# Patient Record
Sex: FEMALE | Race: WHITE | NOT HISPANIC OR LATINO | Employment: FULL TIME | URBAN - METROPOLITAN AREA
[De-identification: names, ages, dates, MRNs, and addresses within clinical notes are randomized per-mention and may not be internally consistent; named-entity substitution may affect disease eponyms.]

---

## 2019-05-09 NOTE — PROGRESS NOTES
[facility] OB Intake note  Subjective   28 year old woman presents to clinic for initiation of OB care. She has a history of one pregnancy that was complicated by severe HELLP and pre-eclampsia in 2016, delivering at 30 weeks. She was scheduled today for an OB intake appt, however with her LMP she would be 13w4d and per US today there is no yold sac, embryo or gestational sac. She took pregnancy tests on 5/4/19 and on 5/7/19 that all resulted positive. She has extremely irregular periods, sometimes only 2x a year. She has a dx of PCOS from her teenage years.  Patient's last menstrual period was 02/04/2019.     Symptoms since LMP include breast tenderness.     Objective  -VS: reviewed and within normal limits   -General appearance: no acute distress, patient is comfortable   NEUROLOGICAL/PSYCHIATRIC   - Orientated x3,   -Mood and affect: : normal   - See US report from today. Reviewed by Dr. Leonard    Assessment/Plan  Ameena was seen today for recheck.    Diagnoses and all orders for this visit:    Encounter for supervision of other normal pregnancy in first trimester  -     HCG Quantitative Pregnancy, Blood (PKJ912)  -     HCG Quantitative Pregnancy, Blood (EDG625); Future  -     US OB <14 Weeks w Transvaginal Single; Future      Outpatient Encounter Medications as of 5/10/2019   Medication Sig Dispense Refill     neomycin-polymyxin-dexamethasone (MAXITROL) 3.5-02943-2.1 ophthalmic ointment 1 g 4 times daily (before meals and nightly)  1     No facility-administered encounter medications on file as of 5/10/2019.       Orders Placed This Encounter   Procedures     US OB <14 Weeks w Transvaginal Single     HCG Quantitative Pregnancy, Blood (CYQ140)     HCG Quantitative Pregnancy, Blood ([id number])                 Orders Placed This Encounter   Procedures     US OB <14 Weeks w Transvaginal Single     HCG Quantitative Pregnancy, Blood (GGZ908)     HCG Quantitative Pregnancy, Blood (YCF505)     - Reviewed that this finding is  less concerning given her irregular menses, but still warrants closer attention.   - Educated about warning signs for ectopic, molar and mscg, when to call and when to come in.   - Will  a prenatal vitamin and begin taking.   - Serial Quant hcg's today and on Sunday.   - US at end of next week or beginning of next.     Annabel Avalos CNM

## 2019-05-10 ENCOUNTER — OFFICE VISIT (OUTPATIENT)
Dept: OBGYN | Facility: CLINIC | Age: 29
End: 2019-05-10
Attending: ADVANCED PRACTICE MIDWIFE
Payer: COMMERCIAL

## 2019-05-10 ENCOUNTER — APPOINTMENT (OUTPATIENT)
Dept: LAB | Facility: CLINIC | Age: 29
End: 2019-05-10
Attending: ADVANCED PRACTICE MIDWIFE
Payer: COMMERCIAL

## 2019-05-10 ENCOUNTER — HOSPITAL ENCOUNTER (OUTPATIENT)
Dept: ULTRASOUND IMAGING | Facility: CLINIC | Age: 29
Discharge: HOME | End: 2019-05-10
Attending: ADVANCED PRACTICE MIDWIFE | Admitting: ADVANCED PRACTICE MIDWIFE
Payer: COMMERCIAL

## 2019-05-10 VITALS
SYSTOLIC BLOOD PRESSURE: 120 MMHG | HEIGHT: 65 IN | HEART RATE: 66 BPM | DIASTOLIC BLOOD PRESSURE: 75 MMHG | WEIGHT: 169 LBS | BODY MASS INDEX: 28.16 KG/M2

## 2019-05-10 DIAGNOSIS — Z34.91 ENCOUNTER FOR SUPERVISION OF NORMAL PREGNANCY IN FIRST TRIMESTER, UNSPECIFIED GRAVIDITY: ICD-10-CM

## 2019-05-10 DIAGNOSIS — Z34.81 ENCOUNTER FOR SUPERVISION OF OTHER NORMAL PREGNANCY IN FIRST TRIMESTER: Primary | ICD-10-CM

## 2019-05-10 LAB — B-HCG SERPL-ACNC: 759 IU/L (ref 0–5)

## 2019-05-10 PROCEDURE — 36415 COLL VENOUS BLD VENIPUNCTURE: CPT | Performed by: ADVANCED PRACTICE MIDWIFE

## 2019-05-10 PROCEDURE — 76801 OB US < 14 WKS SINGLE FETUS: CPT

## 2019-05-10 PROCEDURE — 84702 CHORIONIC GONADOTROPIN TEST: CPT | Performed by: ADVANCED PRACTICE MIDWIFE

## 2019-05-10 RX ORDER — NEOMYCIN SULFATE, POLYMYXIN B SULFATE, AND DEXAMETHASONE 3.5; 10000; 1 MG/G; [USP'U]/G; MG/G
1 OINTMENT OPHTHALMIC
Refills: 1 | COMMUNITY
Start: 2019-04-13 | End: 2019-07-26

## 2019-05-10 ASSESSMENT — PAIN SCALES - GENERAL: PAINLEVEL: NO PAIN (0)

## 2019-05-10 ASSESSMENT — MIFFLIN-ST. JEOR: SCORE: 1487.46

## 2019-05-10 NOTE — LETTER
5/10/2019       RE: Ameena Cardoza  [address] [facility] [address]     Dear Colleague,    Thank you for referring your patient, Ameena Cardoza, to the [facility] at [facility]. Please see a copy of my visit note below.    [facility] Intake note  Subjective   28 year old woman presents to clinic for initiation of OB care. She has a history of one pregnancy that was complicated by severe HELLP and pre-eclampsia in 2016, delivering at 30 weeks. She was scheduled today for an OB intake appt, however with her LMP she would be 13w4d and per US today there is no yold sac, embryo or gestational sac. She took pregnancy tests on 5/4/19 and on 5/7/19 that all resulted positive. She has extremely irregular periods, sometimes only 2x a year. She has a dx of PCOS from her teenage years.  Patient's last menstrual period was 02/04/2019.     Symptoms since LMP include breast tenderness.     Objective  -VS: reviewed and within normal limits   -General appearance: no acute distress, patient is comfortable   NEUROLOGICAL/PSYCHIATRIC   - Orientated x3,   -Mood and affect: : normal   - See US report from today. Reviewed by Dr. Leonard    Assessment/Plan  Ameena was seen today for recheck.    Diagnoses and all orders for this visit:    Encounter for supervision of other normal pregnancy in first trimester  -     HCG Quantitative Pregnancy, Blood (UOR127)  -     HCG Quantitative Pregnancy, Blood ([id number]); Future  -     US OB <14 Weeks w Transvaginal Single; Future      Outpatient Encounter Medications as of 5/10/2019   Medication Sig Dispense Refill     neomycin-polymyxin-dexamethasone (MAXITROL) 3.5-02165-6.1 ophthalmic ointment 1 g 4 times daily (before meals and nightly)  1     No facility-administered encounter medications on file as of 5/10/2019.       Orders Placed This Encounter   Procedures     US OB <14 Weeks w Transvaginal Single     HCG Quantitative Pregnancy,  Blood ([id number])     HCG Quantitative Pregnancy, Blood (LPY358)                 Orders Placed This Encounter   Procedures     US OB <14 Weeks w Transvaginal Single     HCG Quantitative Pregnancy, Blood (CXG902)     HCG Quantitative Pregnancy, Blood (QYC911)     - Reviewed that this finding is less concerning given her irregular menses, but still warrants closer attention.   - Educated about warning signs for ectopic, molar and mscg, when to call and when to come in.   - Will  a prenatal vitamin and begin taking.   - Serial Quant hcg's today and on Sunday.   - US at end of next week or beginning of next.     Annabel Avalos CNM          Again, thank you for allowing me to participate in the care of your patient.      Sincerely,    Annabel Avalos CNM

## 2019-05-10 NOTE — LETTER
Date:May 14, 2019      Patient was self referred, no letter generated. Do not send.        [facility] Health Information

## 2019-05-12 DIAGNOSIS — Z34.81 ENCOUNTER FOR SUPERVISION OF OTHER NORMAL PREGNANCY IN FIRST TRIMESTER: ICD-10-CM

## 2019-05-12 LAB — B-HCG SERPL-ACNC: 1751 IU/L (ref 0–5)

## 2019-05-12 PROCEDURE — 84702 CHORIONIC GONADOTROPIN TEST: CPT | Performed by: ADVANCED PRACTICE MIDWIFE

## 2019-05-12 PROCEDURE — 36415 COLL VENOUS BLD VENIPUNCTURE: CPT | Performed by: ADVANCED PRACTICE MIDWIFE

## 2019-05-13 NOTE — RESULT ENCOUNTER NOTE
Patient called back to discuss results of hcg quant. Advised it is recommended to repeat US in one week to f/u on early pregnancy with no intrauterine sac at time of ultrasound. Discussed ectopic precautions and advised pt to call or present to ED if develops pain or bleeding. Pt will call radiology to schedule US as no appointments in clinic available. Pt will f/u in clinic with midwife on Tuesday 5/21. Appt scheduled. Pt questions asked and answered.

## 2019-05-20 ENCOUNTER — HOSPITAL ENCOUNTER (OUTPATIENT)
Dept: ULTRASOUND IMAGING | Facility: CLINIC | Age: 29
Discharge: HOME | End: 2019-05-20
Attending: ADVANCED PRACTICE MIDWIFE | Admitting: ADVANCED PRACTICE MIDWIFE
Payer: COMMERCIAL

## 2019-05-20 DIAGNOSIS — Z34.81 ENCOUNTER FOR SUPERVISION OF OTHER NORMAL PREGNANCY IN FIRST TRIMESTER: ICD-10-CM

## 2019-05-20 PROCEDURE — 76801 OB US < 14 WKS SINGLE FETUS: CPT

## 2019-05-21 ENCOUNTER — OFFICE VISIT (OUTPATIENT)
Dept: OBGYN | Facility: CLINIC | Age: 29
End: 2019-05-21
Attending: MIDWIFE
Payer: COMMERCIAL

## 2019-05-21 VITALS
HEART RATE: 86 BPM | DIASTOLIC BLOOD PRESSURE: 72 MMHG | WEIGHT: 170.3 LBS | SYSTOLIC BLOOD PRESSURE: 109 MMHG | BODY MASS INDEX: 28.37 KG/M2 | HEIGHT: 65 IN

## 2019-05-21 DIAGNOSIS — O09.90 HIGH-RISK PREGNANCY, UNSPECIFIED TRIMESTER: Primary | ICD-10-CM

## 2019-05-21 PROCEDURE — G0463 HOSPITAL OUTPT CLINIC VISIT: HCPCS | Mod: ZF

## 2019-05-21 ASSESSMENT — MIFFLIN-ST. JEOR: SCORE: 1493.36

## 2019-05-21 NOTE — LETTER
2019       RE: Ameena Cardoza  [address] [facility] [address] [address]     Dear Colleague,    Thank you for referring your patient, Ameena Cardoza, to the [facility] at [facility]. Please see a copy of my visit note below.    S:  27 yo   Pt here to discuss repeat US done in radiology yesterday.   Hx 1st US 5/10 after + UPT   No GS or evidence of pregnancy    repeat US  CRL [id number] XOCHITL 2020  Pt is noting mild nausea  Delivered at 30 wks due to severe preeclampsia with HELLP by  section.    young daughter is almost 3  Spent 7 wks in NICU  With no sequelae   This pregnancy is planned  Pt is happy with this good news    O:  VS WNL  See normal US result  A: 27 yo G2P 0101  Hx severe preeclampsia, HELLP confirmed early IIUP  P: plan scheduling OB intake    CECILY  Records from C/S Vidya Brian CNM APRSHAJI         Again, thank you for allowing me to participate in the care of your patient.      Sincerely,    HERNAN Sebastian CNM

## 2019-05-21 NOTE — PROGRESS NOTES
S:  29 yo   Pt here to discuss repeat US done in radiology yesterday.   Hx 1st US 5/10 after + UPT   No GS or evidence of pregnancy    repeat US  CRL [id number] XOCHITL 2020  Pt is noting mild nausea  Delivered at 30 wks due to severe preeclampsia with HELLP by  section.    young daughter is almost 3  Spent 7 wks in NICU  With no sequelae   This pregnancy is planned  Pt is happy with this good news    O:  VS WNL  See normal US result  A: 29 yo G2P 0101  Hx severe preeclampsia, HELLP confirmed early IIUP  P: plan scheduling OB intake    CECILY  Records from C/S Vidya Brian CNM APRN

## 2019-05-21 NOTE — LETTER
Date:May 24, 2019      Patient was self referred, no letter generated. Do not send.        [facility] Health Information

## 2019-05-27 ENCOUNTER — TRANSFERRED RECORDS (OUTPATIENT)
Dept: HEALTH INFORMATION MANAGEMENT | Facility: CLINIC | Age: 29
End: 2019-05-27

## 2019-06-13 NOTE — PROGRESS NOTES
[facility] OB Intake note  Subjective   28 year old woman presents to clinic for initiation of OB care. Patient's last menstrual period was 2019 (approximate).  at Unknown by Estimated Date of Delivery: 2020 based on US confirms. Reviewed dating ultrasound. Pregnancy is planned.      Symptoms since LMP include nausea, fatigue and weight loss. Patient has tried these relief measures: diet modification, vitamin B-6, Unisom, small frequent meals, increased rest and increased fluids.    - Genetic/Infection questionnaire completed, risks include. Pt  does not have a recent known exposure to Parvo or CMV so IgG/IgM testing WILL NOT be ordered.   Have you traveled during the pregnancy?No  Have your sexual partner(s) travelled during the pregnancy?No        - Current Medications  Current Outpatient Medications   Medication Sig Dispense Refill     aspirin (ASA) 81 MG tablet Take 1 tablet (81 mg) by mouth daily 100 tablet 3     ondansetron (ZOFRAN) 4 MG tablet Take 1 tablet (4 mg) by mouth every 8 hours as needed for nausea 14 tablet 0     Prenatal Vit-Fe Fumarate-FA (PRENATAL VITAMIN PO)        neomycin-polymyxin-dexamethasone (MAXITROL) 3.5-05702-0.1 ophthalmic ointment 1 g 4 times daily (before meals and nightly)  1         - Co-morbids   Past Medical History:   Diagnosis Date     Family history of breast cancer gene mutation in first degree relative     gene tested CHek 2 associated with increased risk  q6 mos mammo starting at 33      HELLP (hemolytic anemia/elev liver enzymes/low platelets in pregnancy)      PCOS (polycystic ovarian syndrome)     age 12 no treatment necessary      Preeclampsia, severe, third trimester     HTN, proteinuria, 30wks-->mag, IOL x 24 hrs->C/S     - Risk for GDM -  does not  have Personal history of GDM, BMI>30, h/o prediabetes/glucose intolerance, first degree relative with GDM or DM    WILL NOT have an early GCT and possible Hgb A1C    - High Risk for Pre E- meets one of  following criteria The patient does h/o Pre Eclampsia, Current multi fetal gestation, Pre Gestational Diabetes (Type 1 or Type 2), chronic hypertension, renal disease, Autoimmune disease (systematic lupus erythematosus, antiphospholipid syndrome) so WILL start low dose aspirin (81mg) starting between 12 and 28 weeks to prevent early onset preeclampsia.        - The patient  does not have a history of spontaneous  birth so  WILL NOT consider progesterone starting at 16-20 weeks and/or serial transvaginal cervical length ultrasounds from 16-24 weeks.     -The patient does not have a history of immunosuppresion or HIV so Toxoplasma IgG/IgM WILL NOT be ordered.     PERSONAL/SOCIAL HISTORY    lives with their family.  Employment: Full time. Her job involves sedentary activity .  History of anxiety or depression  no  Additional items: None    Objective  -VS: reviewed and within normal limits   -General appearance: no acute distress, patient is comfortable   NEUROLOGICAL/PSYCHIATRIC   - Orientated x3,   -Mood and affect: : normal     Assessment/Plan  Ameena was seen today for prenatal care.    Diagnoses and all orders for this visit:    High-risk pregnancy, unspecified trimester  -     ABO/Rh type and screen  -     Hepatitis B surface antigen  -     CBC with platelets  -     HIV Antigen Antibody Combo  -     Rubella Antibody IgG Quantitative  -     Treponema Abs w Reflex to RPR and Titer  -     Urine Culture Aerobic Bacterial  -     Vitamin D Deficiency  -     Hepatitis B Surface Antibody  -     Hepatitis C antibody  -     ondansetron (ZOFRAN) 4 MG tablet; Take 1 tablet (4 mg) by mouth every 8 hours as needed for nausea  -     aspirin (ASA) 81 MG tablet; Take 1 tablet (81 mg) by mouth daily  -     Cancel: ALT  -     Cancel: AST  -     Protein  random urine with Creat Ratio  -     ALT  -     AST  -     Creatinine urine calculation only  -     [facility] FETAL MED CTR REFERRAL-PREGNANCY    History of   delivery    History of severe pre-eclampsia  Comments:  dx at 30wks, admitted, BMZ, started IOL (x24 hrs), then prim CS. Infant in NICU x 7 wks, doing well now at age 1  Orders:  -     ABO/Rh type and screen  -     Hepatitis B surface antigen  -     CBC with platelets  -     HIV Antigen Antibody Combo  -     Rubella Antibody IgG Quantitative  -     Treponema Abs w Reflex to RPR and Titer  -     Urine Culture Aerobic Bacterial  -     Vitamin D Deficiency  -     Hepatitis B Surface Antibody  -     Hepatitis C antibody  -     ondansetron (ZOFRAN) 4 MG tablet; Take 1 tablet (4 mg) by mouth every 8 hours as needed for nausea  -     aspirin (ASA) 81 MG tablet; Take 1 tablet (81 mg) by mouth daily  -     Cancel: ALT  -     Cancel: AST  -     Protein  random urine with Creat Ratio  -     MAT FETAL MED CTR REFERRAL-PREGNANCY    Family history of breast cancer gene mutation in first degree relative  -     [facility] FETAL MED CTR REFERRAL-PREGNANCY    PCOS (polycystic ovarian syndrome)  -     MAT FETAL MED CTR REFERRAL-PREGNANCY        28 year old  Unknown weeks of pregnancy with XOCHITL of 2020 by LMP of Patient's last menstrual period was 2019 (approximate).. Ultrasound confirms.   Outpatient Encounter Medications as of 2019   Medication Sig Dispense Refill     aspirin (ASA) 81 MG tablet Take 1 tablet (81 mg) by mouth daily 100 tablet 3     ondansetron (ZOFRAN) 4 MG tablet Take 1 tablet (4 mg) by mouth every 8 hours as needed for nausea 14 tablet 0     Prenatal Vit-Fe Fumarate-FA (PRENATAL VITAMIN PO)        neomycin-polymyxin-dexamethasone (MAXITROL) 3.5-86043-3.1 ophthalmic ointment 1 g 4 times daily (before meals and nightly)  1     No facility-administered encounter medications on file as of 2019.       Orders Placed This Encounter   Procedures     Hepatitis B surface antigen     CBC with platelets     HIV Antigen Antibody Combo     Rubella Antibody IgG Quantitative     Treponema Abs w Reflex to RPR  and Titer     Vitamin D Deficiency     Hepatitis B Surface Antibody     Hepatitis C antibody     Protein  random urine with Creat Ratio     ALT     AST     Creatinine urine calculation only     MAT FETAL MED CTR REFERRAL-PREGNANCY     ABO/Rh type and screen                 Orders Placed This Encounter   Procedures     Hepatitis B surface antigen     CBC with platelets     HIV Antigen Antibody Combo     Rubella Antibody IgG Quantitative     Treponema Abs w Reflex to RPR and Titer     Vitamin D Deficiency     Hepatitis B Surface Antibody     Hepatitis C antibody     Protein  random urine with Creat Ratio     ALT     AST     Creatinine urine calculation only     MAT FETAL MED CTR REFERRAL-PREGNANCY     ABO/Rh type and screen         - Oriented to Practice, types of care, and how to reach a provider.  Pt prefers CNM vs MD ,open.  - Patient received 1st trimester new OB education packet complete with aide of [employer] booklet including information on genetic screening test options.  - Patient desires 1st trimester screening and desires level II ultrasound which were ordered.  - Educational handout on the prevention of infections diseases during pregnancy provided.  - Patient was encouraged to start prenatal vitamins as tolerated.    - Patient was sent to lab for routine OB labs including PreE labs.  Hepatitis C screening was ordered . .  - Reviewed recommendation for low dose aspirin daily to prevent pre eclampsia, pt agrees, follow up at NOB visit. RX sent  - Pregnancy concerns to be addressed by provider at new OB exam include: history of preeclampsia, will ask MFM as well.    Pt to RTO for NOB visit in 2 weeks and prn if questions or concerns    HERNAN Marquez CNM

## 2019-06-14 ENCOUNTER — TRANSCRIBE ORDERS (OUTPATIENT)
Dept: OBGYN | Facility: CLINIC | Age: 29
End: 2019-06-14

## 2019-06-14 ENCOUNTER — OFFICE VISIT (OUTPATIENT)
Dept: OBGYN | Facility: CLINIC | Age: 29
End: 2019-06-14
Attending: ADVANCED PRACTICE MIDWIFE
Payer: COMMERCIAL

## 2019-06-14 VITALS
SYSTOLIC BLOOD PRESSURE: 112 MMHG | WEIGHT: 167.2 LBS | BODY MASS INDEX: 27.86 KG/M2 | HEART RATE: 64 BPM | HEIGHT: 65 IN | DIASTOLIC BLOOD PRESSURE: 70 MMHG

## 2019-06-14 DIAGNOSIS — E28.2 PCOS (POLYCYSTIC OVARIAN SYNDROME): ICD-10-CM

## 2019-06-14 DIAGNOSIS — Z87.59 HISTORY OF SEVERE PRE-ECLAMPSIA: ICD-10-CM

## 2019-06-14 DIAGNOSIS — Z84.81 FAMILY HISTORY OF BREAST CANCER GENE MUTATION IN FIRST DEGREE RELATIVE: ICD-10-CM

## 2019-06-14 DIAGNOSIS — O09.90 HIGH-RISK PREGNANCY, UNSPECIFIED TRIMESTER: Primary | ICD-10-CM

## 2019-06-14 DIAGNOSIS — Z98.891 HISTORY OF CESAREAN DELIVERY: ICD-10-CM

## 2019-06-14 DIAGNOSIS — O26.90 PREGNANCY RELATED CONDITION, ANTEPARTUM: Primary | ICD-10-CM

## 2019-06-14 LAB
ABO + RH BLD: NORMAL
ABO + RH BLD: NORMAL
BLD GP AB SCN SERPL QL: NORMAL
BLOOD BANK CMNT PATIENT-IMP: NORMAL
CREAT UR-MCNC: 38 MG/DL
ERYTHROCYTE [DISTWIDTH] IN BLOOD BY AUTOMATED COUNT: 12.3 % (ref 10–15)
HCT VFR BLD AUTO: 39.9 % (ref 35–47)
HGB BLD-MCNC: 13.2 G/DL (ref 11.7–15.7)
MCH RBC QN AUTO: 29.9 PG (ref 26.5–33)
MCHC RBC AUTO-ENTMCNC: 33.1 G/DL (ref 31.5–36.5)
MCV RBC AUTO: 91 FL (ref 78–100)
PLATELET # BLD AUTO: 265 10E9/L (ref 150–450)
PROT UR-MCNC: <0.05 G/L
PROT/CREAT 24H UR: NORMAL G/G CR (ref 0–0.2)
RBC # BLD AUTO: 4.41 10E12/L (ref 3.8–5.2)
SPECIMEN EXP DATE BLD: NORMAL
WBC # BLD AUTO: 8.9 10E9/L (ref 4–11)

## 2019-06-14 PROCEDURE — 82306 VITAMIN D 25 HYDROXY: CPT | Performed by: ADVANCED PRACTICE MIDWIFE

## 2019-06-14 PROCEDURE — 86803 HEPATITIS C AB TEST: CPT | Performed by: ADVANCED PRACTICE MIDWIFE

## 2019-06-14 PROCEDURE — 84460 ALANINE AMINO (ALT) (SGPT): CPT | Performed by: ADVANCED PRACTICE MIDWIFE

## 2019-06-14 PROCEDURE — 87186 SC STD MICRODIL/AGAR DIL: CPT | Performed by: ADVANCED PRACTICE MIDWIFE

## 2019-06-14 PROCEDURE — G0463 HOSPITAL OUTPT CLINIC VISIT: HCPCS

## 2019-06-14 PROCEDURE — 84156 ASSAY OF PROTEIN URINE: CPT | Performed by: ADVANCED PRACTICE MIDWIFE

## 2019-06-14 PROCEDURE — 86900 BLOOD TYPING SEROLOGIC ABO: CPT | Performed by: ADVANCED PRACTICE MIDWIFE

## 2019-06-14 PROCEDURE — 87086 URINE CULTURE/COLONY COUNT: CPT | Performed by: ADVANCED PRACTICE MIDWIFE

## 2019-06-14 PROCEDURE — 36415 COLL VENOUS BLD VENIPUNCTURE: CPT | Performed by: ADVANCED PRACTICE MIDWIFE

## 2019-06-14 PROCEDURE — 86706 HEP B SURFACE ANTIBODY: CPT | Performed by: ADVANCED PRACTICE MIDWIFE

## 2019-06-14 PROCEDURE — 86850 RBC ANTIBODY SCREEN: CPT | Performed by: ADVANCED PRACTICE MIDWIFE

## 2019-06-14 PROCEDURE — 84450 TRANSFERASE (AST) (SGOT): CPT | Performed by: ADVANCED PRACTICE MIDWIFE

## 2019-06-14 PROCEDURE — 86901 BLOOD TYPING SEROLOGIC RH(D): CPT | Performed by: ADVANCED PRACTICE MIDWIFE

## 2019-06-14 PROCEDURE — 87340 HEPATITIS B SURFACE AG IA: CPT | Performed by: ADVANCED PRACTICE MIDWIFE

## 2019-06-14 PROCEDURE — 87088 URINE BACTERIA CULTURE: CPT | Performed by: ADVANCED PRACTICE MIDWIFE

## 2019-06-14 PROCEDURE — 86762 RUBELLA ANTIBODY: CPT | Performed by: ADVANCED PRACTICE MIDWIFE

## 2019-06-14 PROCEDURE — 85027 COMPLETE CBC AUTOMATED: CPT | Performed by: ADVANCED PRACTICE MIDWIFE

## 2019-06-14 PROCEDURE — 87389 HIV-1 AG W/HIV-1&-2 AB AG IA: CPT | Performed by: ADVANCED PRACTICE MIDWIFE

## 2019-06-14 PROCEDURE — 86780 TREPONEMA PALLIDUM: CPT | Performed by: ADVANCED PRACTICE MIDWIFE

## 2019-06-14 RX ORDER — ONDANSETRON 4 MG/1
4 TABLET, FILM COATED ORAL EVERY 8 HOURS PRN
Qty: 14 TABLET | Refills: 0 | Status: SHIPPED | OUTPATIENT
Start: 2019-06-14 | End: 2019-07-26

## 2019-06-14 ASSESSMENT — MIFFLIN-ST. JEOR: SCORE: 1479.29

## 2019-06-14 NOTE — LETTER
Date:June 18, 2019      Patient was self referred, no letter generated. Do not send.        [facility] Health Information

## 2019-06-14 NOTE — LETTER
2019       RE: Ameena Cardoza  [address]  [facility] [id number]     Dear Colleague,    Thank you for referring your patient, Ameena Cardoza, to the [facility] at [facility]. Please see a copy of my visit note below.    [facility] OB Intake note  Subjective   28 year old woman presents to clinic for initiation of OB care. Patient's last menstrual period was 2019 (approximate).  at Unknown by Estimated Date of Delivery: 2020 based on US confirms. Reviewed dating ultrasound. Pregnancy is planned.      Symptoms since LMP include nausea, fatigue and weight loss. Patient has tried these relief measures: diet modification, vitamin B-6, Unisom, small frequent meals, increased rest and increased fluids.    - Genetic/Infection questionnaire completed, risks include. Pt  does not have a recent known exposure to Parvo or CMV so IgG/IgM testing WILL NOT be ordered.   Have you traveled during the pregnancy?No  Have your sexual partner(s) travelled during the pregnancy?No        - Current Medications  Current Outpatient Medications   Medication Sig Dispense Refill     aspirin (ASA) 81 MG tablet Take 1 tablet (81 mg) by mouth daily 100 tablet 3     ondansetron (ZOFRAN) 4 MG tablet Take 1 tablet (4 mg) by mouth every 8 hours as needed for nausea 14 tablet 0     Prenatal Vit-Fe Fumarate-FA (PRENATAL VITAMIN PO)        neomycin-polymyxin-dexamethasone (MAXITROL) 3.5-93564-1.1 ophthalmic ointment 1 g 4 times daily (before meals and nightly)  1         - Co-morbids   Past Medical History:   Diagnosis Date     Family history of breast cancer gene mutation in first degree relative     gene tested CHek 2 associated with increased risk  q6 mos mammo starting at 33      HELLP (hemolytic anemia/elev liver enzymes/low platelets in pregnancy)      PCOS (polycystic ovarian syndrome)     age 12 no treatment necessary      Preeclampsia, severe, third  trimester 2016    HTN, proteinuria, 30wks-->mag, IOL x 24 hrs->C/S     - Risk for GDM -  does not  have Personal history of GDM, BMI>30, h/o prediabetes/glucose intolerance, first degree relative with GDM or DM    WILL NOT have an early GCT and possible Hgb A1C    - High Risk for Pre E- meets one of following criteria The patient does h/o Pre Eclampsia, Current multi fetal gestation, Pre Gestational Diabetes (Type 1 or Type 2), chronic hypertension, renal disease, Autoimmune disease (systematic lupus erythematosus, antiphospholipid syndrome) so WILL start low dose aspirin (81mg) starting between 12 and 28 weeks to prevent early onset preeclampsia.        - The patient  does not have a history of spontaneous  birth so  WILL NOT consider progesterone starting at 16-20 weeks and/or serial transvaginal cervical length ultrasounds from 16-24 weeks.     -The patient does not have a history of immunosuppresion or HIV so Toxoplasma IgG/IgM WILL NOT be ordered.     PERSONAL/SOCIAL HISTORY    lives with their family.  Employment: Full time. Her job involves sedentary activity .  History of anxiety or depression  no  Additional items: None    Objective  -VS: reviewed and within normal limits   -General appearance: no acute distress, patient is comfortable   NEUROLOGICAL/PSYCHIATRIC   - Orientated x3,   -Mood and affect: : normal     Assessment/Plan  Ameena was seen today for prenatal care.    Diagnoses and all orders for this visit:    High-risk pregnancy, unspecified trimester  -     ABO/Rh type and screen  -     Hepatitis B surface antigen  -     CBC with platelets  -     HIV Antigen Antibody Combo  -     Rubella Antibody IgG Quantitative  -     Treponema Abs w Reflex to RPR and Titer  -     Urine Culture Aerobic Bacterial  -     Vitamin D Deficiency  -     Hepatitis B Surface Antibody  -     Hepatitis C antibody  -     ondansetron (ZOFRAN) 4 MG tablet; Take 1 tablet (4 mg) by mouth every 8 hours as needed for  nausea  -     aspirin (ASA) 81 MG tablet; Take 1 tablet (81 mg) by mouth daily  -     Cancel: ALT  -     Cancel: AST  -     Protein  random urine with Creat Ratio  -     ALT  -     AST  -     Creatinine urine calculation only  -     [facility] FETAL MED CTR REFERRAL-PREGNANCY    History of  delivery    History of severe pre-eclampsia  Comments:  dx at 30wks, admitted, BMZ, started IOL (x24 hrs), then prim CS. Infant in NICU x 7 wks, doing well now at age 1  Orders:  -     ABO/Rh type and screen  -     Hepatitis B surface antigen  -     CBC with platelets  -     HIV Antigen Antibody Combo  -     Rubella Antibody IgG Quantitative  -     Treponema Abs w Reflex to RPR and Titer  -     Urine Culture Aerobic Bacterial  -     Vitamin D Deficiency  -     Hepatitis B Surface Antibody  -     Hepatitis C antibody  -     ondansetron (ZOFRAN) 4 MG tablet; Take 1 tablet (4 mg) by mouth every 8 hours as needed for nausea  -     aspirin (ASA) 81 MG tablet; Take 1 tablet (81 mg) by mouth daily  -     Cancel: ALT  -     Cancel: AST  -     Protein  random urine with Creat Ratio  -     [facility] FETAL MED CTR REFERRAL-PREGNANCY    Family history of breast cancer gene mutation in first degree relative  -     [facility] FETAL MED CTR REFERRAL-PREGNANCY    PCOS (polycystic ovarian syndrome)  -     [facility] FETAL MED CTR REFERRAL-PREGNANCY        28 year old  Unknown weeks of pregnancy with XOCHITL of 2020 by LMP of Patient's last menstrual period was 2019 (approximate).. Ultrasound confirms.   Outpatient Encounter Medications as of 2019   Medication Sig Dispense Refill     aspirin (ASA) 81 MG tablet Take 1 tablet (81 mg) by mouth daily 100 tablet 3     ondansetron (ZOFRAN) 4 MG tablet Take 1 tablet (4 mg) by mouth every 8 hours as needed for nausea 14 tablet 0     Prenatal Vit-Fe Fumarate-FA (PRENATAL VITAMIN PO)        neomycin-polymyxin-dexamethasone (MAXITROL) 3.5-40153-7.1 ophthalmic ointment 1 g 4 times daily (before meals and  nightly)  1     No facility-administered encounter medications on file as of 6/14/2019.       Orders Placed This Encounter   Procedures     Hepatitis B surface antigen     CBC with platelets     HIV Antigen Antibody Combo     Rubella Antibody IgG Quantitative     Treponema Abs w Reflex to RPR and Titer     Vitamin D Deficiency     Hepatitis B Surface Antibody     Hepatitis C antibody     Protein  random urine with Creat Ratio     ALT     AST     Creatinine urine calculation only     [facility] FETAL MED CTR REFERRAL-PREGNANCY     ABO/Rh type and screen                 Orders Placed This Encounter   Procedures     Hepatitis B surface antigen     CBC with platelets     HIV Antigen Antibody Combo     Rubella Antibody IgG Quantitative     Treponema Abs w Reflex to RPR and Titer     Vitamin D Deficiency     Hepatitis B Surface Antibody     Hepatitis C antibody     Protein  random urine with Creat Ratio     ALT     AST     Creatinine urine calculation only     MAT FETAL MED CTR REFERRAL-PREGNANCY     ABO/Rh type and screen         - Oriented to Practice, types of care, and how to reach a provider.  Pt prefers CNM vs MD ,open.  - Patient received 1st trimester new OB education packet complete with aide of [employer] booklet including information on genetic screening test options.  - Patient desires 1st trimester screening and desires level II ultrasound which were ordered.  - Educational handout on the prevention of infections diseases during pregnancy provided.  - Patient was encouraged to start prenatal vitamins as tolerated.    - Patient was sent to lab for routine OB labs including PreE labs.  Hepatitis C screening was ordered . .  - Reviewed recommendation for low dose aspirin daily to prevent pre eclampsia, pt agrees, follow up at NOB visit. RX sent  - Pregnancy concerns to be addressed by provider at new OB exam include: history of preeclampsia, will ask MFM as well.    Pt to RTO for NOB visit in 2 weeks and prn  if questions or concerns    HERNAN Marquez CNM            Again, thank you for allowing me to participate in the care of your patient.      Sincerely,    HERNAN Marquez CNM

## 2019-06-15 LAB
RUBV IGG SERPL IA-ACNC: 9 IU/ML
T PALLIDUM AB SER QL: NONREACTIVE

## 2019-06-16 LAB
BACTERIA SPEC CULT: ABNORMAL
BACTERIA SPEC CULT: ABNORMAL
Lab: ABNORMAL
SPECIMEN SOURCE: ABNORMAL

## 2019-06-17 DIAGNOSIS — N30.00 ACUTE CYSTITIS WITHOUT HEMATURIA: Primary | ICD-10-CM

## 2019-06-17 LAB
ALT SERPL W P-5'-P-CCNC: 27 U/L (ref 0–50)
AST SERPL W P-5'-P-CCNC: 15 U/L (ref 0–45)
DEPRECATED CALCIDIOL+CALCIFEROL SERPL-MC: 40 UG/L (ref 20–75)
HBV SURFACE AB SERPL IA-ACNC: 179.49 M[IU]/ML
HBV SURFACE AG SERPL QL IA: NONREACTIVE
HCV AB SERPL QL IA: NONREACTIVE
HIV 1+2 AB+HIV1 P24 AG SERPL QL IA: NONREACTIVE

## 2019-06-17 RX ORDER — CEPHALEXIN 750 MG/1
750 CAPSULE ORAL 2 TIMES DAILY
Qty: 14 CAPSULE | Refills: 0 | Status: SHIPPED | OUTPATIENT
Start: 2019-06-17 | End: 2019-06-28

## 2019-06-24 ENCOUNTER — TELEPHONE (OUTPATIENT)
Dept: MATERNAL FETAL MEDICINE | Facility: CLINIC | Age: 29
End: 2019-06-24

## 2019-06-24 NOTE — TELEPHONE ENCOUNTER
DEMARCUS received referral from [facility] for FTS and L2.  Patient is scheduled for her L2 on 8/16, patient stated that she is no longer interested in pursuing the FTS.  Referring clinic notified, removing FTS orders.    Melissa Cardoza

## 2019-06-28 ENCOUNTER — OFFICE VISIT (OUTPATIENT)
Dept: OBGYN | Facility: CLINIC | Age: 29
End: 2019-06-28
Attending: ADVANCED PRACTICE MIDWIFE
Payer: COMMERCIAL

## 2019-06-28 VITALS
WEIGHT: 169 LBS | BODY MASS INDEX: 28.16 KG/M2 | HEIGHT: 65 IN | DIASTOLIC BLOOD PRESSURE: 72 MMHG | SYSTOLIC BLOOD PRESSURE: 121 MMHG | HEART RATE: 74 BPM

## 2019-06-28 DIAGNOSIS — O09.90 HIGH-RISK PREGNANCY, UNSPECIFIED TRIMESTER: Primary | ICD-10-CM

## 2019-06-28 DIAGNOSIS — Z87.59 HISTORY OF SEVERE PRE-ECLAMPSIA: ICD-10-CM

## 2019-06-28 PROCEDURE — G0463 HOSPITAL OUTPT CLINIC VISIT: HCPCS | Mod: ZF

## 2019-06-28 ASSESSMENT — PAIN SCALES - GENERAL: PAINLEVEL: NO PAIN (0)

## 2019-06-28 ASSESSMENT — MIFFLIN-ST. JEOR: SCORE: 1487.46

## 2019-06-28 NOTE — NURSING NOTE
Chief Complaint   Patient presents with     Prenatal Care     HERB 11 weeks and 3 days   Candace Dorsey LPN

## 2019-06-28 NOTE — LETTER
2019       RE: Ameena Cardoza  [address] [facility] [address]     Dear Colleague,    Thank you for referring your patient, Ameena Cardoza, to the [facility] at [facility]. Please see a copy of my visit note below.    SUBJECTIVE:   Ameena is a 28 year old female who presents to clinic for a new OB visit.   at 11w3d with Estimated Date of Delivery: 2020 based on dating ultrasound. Feels well. Has  started PNV.     She has not had bleeding since her LMP.   She has had mild nausea. Weight loss has occurred for 1 lb.  This was a planned pregnancy.   FOB is involved,  - Red     OTHER CONCERNS:   - Took abx for +urine culture at [facility]. Needs KEREN- unable to leave sample.   PreE dx 30 wk, inpatient, BMZ, IOL, mag sulfate, C/S,7 weeks in NICU   Hx of LTCS for pre-eclampsia at 30 weeks (received BMTZ, Mag- initially induced)  - Desires repeat c/s  - nervous re: pre-e again this pregnancy    ===========================================   ROS: 10 point ROS neg other than the symptoms noted above in the HPI.    PSYCHIATRIC:  Denies mood changes, difficulty concentrating, depression, anxiety, panic attacks or post partum depression  PHQ-9 score:  No flowsheet data found.  No flowsheet data found.      Past History:  Her past medical history   Past Medical History:   Diagnosis Date     Family history of breast cancer gene mutation in first degree relative     gene tested CHek 2 associated with increased risk  q6 mos mammo starting at 33      HELLP (hemolytic anemia/elev liver enzymes/low platelets in pregnancy)      PCOS (polycystic ovarian syndrome)     age 12 no treatment necessary      Preeclampsia, severe, third trimester 2016    HTN, proteinuria, 30wks-->mag, IOL x 24 hrs->C/S   .     Since her last LMP she denies use of alcohol, tobacco and street drugs.  HISTORY:  Family History   Problem Relation Age of Onset     Breast  Cancer Paternal Grandmother         age 40-x 2 bouts     Hypertension Father 45     Breast Cancer Maternal Aunt         40     Breast Cancer Maternal Aunt         40     Diabetes Paternal Grandfather      Social History     Socioeconomic History     Marital status:      Spouse name: Red     Number of children: 1     Years of education: Not on file     Highest education level: Not on file   Occupational History     Comment: advisor, undergrad, learning abroad   Social Needs     Financial resource strain: Not on file     Food insecurity:     Worry: Not on file     Inability: Not on file     Transportation needs:     Medical: Not on file     Non-medical: Not on file   Tobacco Use     Smoking status: Never Smoker     Smokeless tobacco: Never Used   Substance and Sexual Activity     Alcohol use: Not Currently     Drug use: Not Currently     Sexual activity: Yes     Partners: Male   Lifestyle     Physical activity:     Days per week: Not on file     Minutes per session: Not on file     Stress: Not on file   Relationships     Social connections:     Talks on phone: Not on file     Gets together: Not on file     Attends Judaism service: Not on file     Active member of club or organization: Not on file     Attends meetings of clubs or organizations: Not on file     Relationship status: Not on file     Intimate partner violence:     Fear of current or ex partner: Not on file     Emotionally abused: Not on file     Physically abused: Not on file     Forced sexual activity: Not on file   Other Topics Concern     Not on file   Social History Narrative     Not on file     Current Outpatient Medications   Medication Sig     aspirin (ASA) 81 MG tablet Take 1 tablet (81 mg) by mouth daily     neomycin-polymyxin-dexamethasone (MAXITROL) 3.5-81648-0.1 ophthalmic ointment 1 g 4 times daily (before meals and nightly)     ondansetron (ZOFRAN) 4 MG tablet Take 1 tablet (4 mg) by mouth every 8 hours as needed for nausea      "Prenatal Vit-Fe Fumarate-FA (PRENATAL VITAMIN PO)      No current facility-administered medications for this visit.      Allergies   Allergen Reactions     Sulfa Drugs GI Disturbance       ============================================  MEDICAL HISTORY  Past Medical History:   Diagnosis Date     Family history of breast cancer gene mutation in first degree relative     gene tested CHek 2 associated with increased risk  q6 mos mammo starting at 33      HELLP (hemolytic anemia/elev liver enzymes/low platelets in pregnancy)      PCOS (polycystic ovarian syndrome)     age 12 no treatment necessary      Preeclampsia, severe, third trimester     HTN, proteinuria, 30wks-->mag, IOL x 24 hrs->C/S     Past Surgical History:   Procedure Laterality Date      SECTION      30 wks gestation, HTN, induction->C/S,        OB History    Para Term  AB Living   2 1 0 1 0 1   SAB TAB Ectopic Multiple Live Births   0 0 0 0 1      # Outcome Date GA Lbr Jose Miguel/2nd Weight Sex Delivery Anes PTL Lv   2 Current            1  16 30w6d  1.162 kg (2 lb 9 oz) F CS-LTranv Spinal Y CHARIS      Birth Comments: [place]      Complications: Preeclampsia/Hypertension, HELLP (hemolytic anemia/elev liver enzymes/low platelets in pregnancy)      Name: Yolanda       Obstetric Comments   PreE dx 30 wk, inpatient, BMZ, IOL, mag sulfate, C/S,7 weeks in NICU          GYN History- no hx of abnormal    I personally reviewed the past social/family/medical and surgical history on the date of service.   I reviewed lab work done at Intake visit with patient.    EXAM:  /72   Pulse 74   Ht 1.651 m (5' 5\")   Wt 76.7 kg (169 lb)   LMP 2019 (Approximate)   Breastfeeding? No   BMI 28.12 kg/m      EXAM:  GENERAL:  Pleasant pregnant female, alert, cooperative  and well groomed.  SKIN:  Warm and dry, without lesions or rashes  HEAD: Symmetrical features.  MOUTH:  Buccal mucosa pink, moist without lesions.  " Teeth in good repair.    NECK:  Thyroid without enlargement and nodules.  Lymph nodes not palpable.   LUNGS:  Clear to auscultation.  BREAST:    No dominant, fixed or suspicious masses are noted.  No skin or nipple changes or axillary nodes.   Nipples everted.      HEART:  RRR without murmur.  ABDOMEN: Soft without masses , tenderness or organomegaly.  No CVA tenderness.  Uterus palpable at size equal to dates.  Well healed scar from  section. Fetal heart tones present.  MUSCULOSKELETAL:  Full range of motion  EXTREMITIES:  No edema. No significant varicosities.   PELVIC EXAM: deferred  WET PREP:Not done  GC/CHLAMYDIA CULTURE OBTAINED: NO- unable to leave sample      ASSESSMENT:  28 year old , 11w3d weeks of pregnancy with XOCHITL of 2020 by dating ultrasound  Intrauterine pregnancy 11w3d size consistent with dates  Genetic Screening: Level 2 Ultrasound only    ICD-10-CM    1. High-risk pregnancy, unspecified trimester O09.90 CANCELED: Chlamydia PCR (Clinic Collect)     CANCELED: Gonorrhea PCR   2. History of severe pre-eclampsia Z87.59        PLAN:  - Reviewed use of triage nurse line and contacting the on-call provider after hours for an urgent need such as fever, vagina bleeding, bladder or vaginal infection, rupture of membranes,  or term labor.    - Reviewed best evidence for: weight gain for her weight and height for pregnancy:  Based on pre-pregnancy weight and Body mass index is 28.12 kg/m . RECOMMENDED WEIGHT GAIN: 15-25 lbs.  -If BMI>=30, weight gain/exercise handout given and reviewed. BMI entered on problem list, to include patient's preferred way to reference obesity during prenatal care, with plan for possible referrals and  testing  -If BMI >=30, and has one of other risk for sleep apnea (snoring, observed apnea or hypertension) refer for sleep study.  - Reviewed healthy diet and foods to avoid; exercise and activity during pregnancy; avoiding exposure to  toxoplasmosis; and maintenance of a generally healthy lifestyle.   - Discussed the harms, benefits, side effects and alternative therapies for current prescribed and OTC medications.    - All pt's  questions discussed and answered.  Pt verbalized understanding of and agreement to plan of care.     - Reviewed OBI labs.  - Did not collect urine today as pt could not leave a sample.   - Needs urine culture test of cure at next visit or sooner prn.  - Needs GC/CT.  - Reviewed high risk for pre-eclampsia. Pt to start daily 81mg aspirin at 12-14 weeks.   - Desires repeat  section.  - Level 2 ultrasound scheduled for 2019.     - Continue scheduled prenatal care, RTC in 4 weeks and prn if questions or concerns    HERNAN Hdz CNM     Again, thank you for allowing me to participate in the care of your patient.      Sincerely,    Tomasa Roach CNM

## 2019-06-28 NOTE — PROGRESS NOTES
SUBJECTIVE:   Ameena is a 28 year old female who presents to clinic for a new OB visit.   at 11w3d with Estimated Date of Delivery: 2020 based on dating ultrasound. Feels well. Has  started PNV.     She has not had bleeding since her LMP.   She has had mild nausea. Weight loss has occurred for 1 lb.  This was a planned pregnancy.   FOB is involved,  - Red     OTHER CONCERNS:   - Took abx for +urine culture at [facility]. Needs KEREN- unable to leave sample.   PreE dx 30 wk, inpatient, BMZ, IOL, mag sulfate, C/S,7 weeks in NICU   Hx of LTCS for pre-eclampsia at 30 weeks (received BMTZ, Mag- initially induced)  - Desires repeat c/s  - nervous re: pre-e again this pregnancy    ===========================================   ROS: 10 point ROS neg other than the symptoms noted above in the HPI.    PSYCHIATRIC:  Denies mood changes, difficulty concentrating, depression, anxiety, panic attacks or post partum depression  PHQ-9 score:  No flowsheet data found.  No flowsheet data found.      Past History:  Her past medical history   Past Medical History:   Diagnosis Date     Family history of breast cancer gene mutation in first degree relative     gene tested CHek 2 associated with increased risk  q6 mos mammo starting at 33      HELLP (hemolytic anemia/elev liver enzymes/low platelets in pregnancy)      PCOS (polycystic ovarian syndrome)     age 12 no treatment necessary      Preeclampsia, severe, third trimester     HTN, proteinuria, 30wks-->mag, IOL x 24 hrs->C/S   .     Since her last LMP she denies use of alcohol, tobacco and street drugs.  HISTORY:  Family History   Problem Relation Age of Onset     Breast Cancer Paternal Grandmother         age 40-x 2 bouts     Hypertension Father 45     Breast Cancer Maternal Aunt         40     Breast Cancer Maternal Aunt         40     Diabetes Paternal Grandfather      Social History     Socioeconomic History     Marital status:      Spouse name: Red      Number of children: 1     Years of education: Not on file     Highest education level: Not on file   Occupational History     Comment: advisor, undergrad, learning abroad   Social Needs     Financial resource strain: Not on file     Food insecurity:     Worry: Not on file     Inability: Not on file     Transportation needs:     Medical: Not on file     Non-medical: Not on file   Tobacco Use     Smoking status: Never Smoker     Smokeless tobacco: Never Used   Substance and Sexual Activity     Alcohol use: Not Currently     Drug use: Not Currently     Sexual activity: Yes     Partners: Male   Lifestyle     Physical activity:     Days per week: Not on file     Minutes per session: Not on file     Stress: Not on file   Relationships     Social connections:     Talks on phone: Not on file     Gets together: Not on file     Attends Moravian service: Not on file     Active member of club or organization: Not on file     Attends meetings of clubs or organizations: Not on file     Relationship status: Not on file     Intimate partner violence:     Fear of current or ex partner: Not on file     Emotionally abused: Not on file     Physically abused: Not on file     Forced sexual activity: Not on file   Other Topics Concern     Not on file   Social History Narrative     Not on file     Current Outpatient Medications   Medication Sig     aspirin (ASA) 81 MG tablet Take 1 tablet (81 mg) by mouth daily     neomycin-polymyxin-dexamethasone (MAXITROL) 3.5-31244-9.1 ophthalmic ointment 1 g 4 times daily (before meals and nightly)     ondansetron (ZOFRAN) 4 MG tablet Take 1 tablet (4 mg) by mouth every 8 hours as needed for nausea     Prenatal Vit-Fe Fumarate-FA (PRENATAL VITAMIN PO)      No current facility-administered medications for this visit.      Allergies   Allergen Reactions     Sulfa Drugs GI Disturbance       ============================================  MEDICAL HISTORY  Past Medical History:   Diagnosis Date     Family  "history of breast cancer gene mutation in first degree relative     gene tested CHek 2 associated with increased risk  q6 mos mammo starting at 33      HELLP (hemolytic anemia/elev liver enzymes/low platelets in pregnancy)      PCOS (polycystic ovarian syndrome)     age 12 no treatment necessary      Preeclampsia, severe, third trimester     HTN, proteinuria, 30wks-->mag, IOL x 24 hrs->C/S     Past Surgical History:   Procedure Laterality Date      SECTION  2016    30 wks gestation, HTN, induction->C/S,        OB History    Para Term  AB Living   2 1 0 1 0 1   SAB TAB Ectopic Multiple Live Births   0 0 0 0 1      # Outcome Date GA Lbr Jose Miguel/2nd Weight Sex Delivery Anes PTL Lv   2 Current            1  16 30w6d  1.162 kg (2 lb 9 oz) F CS-LTranv Spinal Y CHARIS      Birth Comments: Pre[place]severe[place]      Complications: Preeclampsia/Hypertension, HELLP (hemolytic anemia/elev liver enzymes/low platelets in pregnancy)      Name: Yolanda       Obstetric Comments   PreE dx 30 wk, inpatient, BMZ, IOL, mag sulfate, C/S,7 weeks in NICU          GYN History- no hx of abnormal    I personally reviewed the past social/family/medical and surgical history on the date of service.   I reviewed lab work done at Intake visit with patient.    EXAM:  /72   Pulse 74   Ht 1.651 m (5' 5\")   Wt 76.7 kg (169 lb)   LMP 2019 (Approximate)   Breastfeeding? No   BMI 28.12 kg/m     EXAM:  GENERAL:  Pleasant pregnant female, alert, cooperative  and well groomed.  SKIN:  Warm and dry, without lesions or rashes  HEAD: Symmetrical features.  MOUTH:  Buccal mucosa pink, moist without lesions.  Teeth in good repair.    NECK:  Thyroid without enlargement and nodules.  Lymph nodes not palpable.   LUNGS:  Clear to auscultation.  BREAST:    No dominant, fixed or suspicious masses are noted.  No skin or nipple changes or axillary nodes.   Nipples everted.      HEART:  RRR without murmur.  ABDOMEN: " Soft without masses , tenderness or organomegaly.  No CVA tenderness.  Uterus palpable at size equal to dates.  Well healed scar from  section. Fetal heart tones present.  MUSCULOSKELETAL:  Full range of motion  EXTREMITIES:  No edema. No significant varicosities.   PELVIC EXAM: deferred  WET PREP:Not done  GC/CHLAMYDIA CULTURE OBTAINED: NO- unable to leave sample      ASSESSMENT:  28 year old , 11w3d weeks of pregnancy with XOCHITL of 2020 by dating ultrasound  Intrauterine pregnancy 11w3d size consistent with dates  Genetic Screening: Level 2 Ultrasound only    ICD-10-CM    1. High-risk pregnancy, unspecified trimester O09.90 CANCELED: Chlamydia PCR (Clinic Collect)     CANCELED: Gonorrhea PCR   2. History of severe pre-eclampsia Z87.59        PLAN:  - Reviewed use of triage nurse line and contacting the on-call provider after hours for an urgent need such as fever, vagina bleeding, bladder or vaginal infection, rupture of membranes,  or term labor.    - Reviewed best evidence for: weight gain for her weight and height for pregnancy:  Based on pre-pregnancy weight and Body mass index is 28.12 kg/m . RECOMMENDED WEIGHT GAIN: 15-25 lbs.  -If BMI>=30, weight gain/exercise handout given and reviewed. BMI entered on problem list, to include patient's preferred way to reference obesity during prenatal care, with plan for possible referrals and  testing  -If BMI >=30, and has one of other risk for sleep apnea (snoring, observed apnea or hypertension) refer for sleep study.  - Reviewed healthy diet and foods to avoid; exercise and activity during pregnancy; avoiding exposure to toxoplasmosis; and maintenance of a generally healthy lifestyle.   - Discussed the harms, benefits, side effects and alternative therapies for current prescribed and OTC medications.    - All pt's  questions discussed and answered.  Pt verbalized understanding of and agreement to plan of care.     - Reviewed OBI  labs.  - Did not collect urine today as pt could not leave a sample.   - Needs urine culture test of cure at next visit or sooner prn.  - Needs GC/CT.  - Reviewed high risk for pre-eclampsia. Pt to start daily 81mg aspirin at 12-14 weeks.   - Desires repeat  section.  - Level 2 ultrasound scheduled for 2019.     - Continue scheduled prenatal care, RTC in 4 weeks and prn if questions or concerns    Tomasa Roach, HERNAN, CNM

## 2019-07-10 ENCOUNTER — OFFICE VISIT (OUTPATIENT)
Dept: OBGYN | Facility: CLINIC | Age: 29
End: 2019-07-10
Attending: ADVANCED PRACTICE MIDWIFE
Payer: COMMERCIAL

## 2019-07-10 VITALS
HEART RATE: 74 BPM | BODY MASS INDEX: 27.77 KG/M2 | HEIGHT: 65 IN | SYSTOLIC BLOOD PRESSURE: 111 MMHG | DIASTOLIC BLOOD PRESSURE: 71 MMHG | WEIGHT: 166.7 LBS

## 2019-07-10 DIAGNOSIS — Z87.59 HISTORY OF SEVERE PRE-ECLAMPSIA: ICD-10-CM

## 2019-07-10 DIAGNOSIS — O09.90 HIGH-RISK PREGNANCY, UNSPECIFIED TRIMESTER: Primary | ICD-10-CM

## 2019-07-10 DIAGNOSIS — Z98.891 HISTORY OF CESAREAN DELIVERY: ICD-10-CM

## 2019-07-10 DIAGNOSIS — R30.0 DYSURIA: ICD-10-CM

## 2019-07-10 PROCEDURE — G0463 HOSPITAL OUTPT CLINIC VISIT: HCPCS | Mod: ZF

## 2019-07-10 PROCEDURE — 87086 URINE CULTURE/COLONY COUNT: CPT | Performed by: ADVANCED PRACTICE MIDWIFE

## 2019-07-10 ASSESSMENT — MIFFLIN-ST. JEOR: SCORE: 1477.03

## 2019-07-10 NOTE — PROGRESS NOTES
"Subjective:      28 year old  at 13w0d presents for a routine prenatal appointment.       No vaginal bleeding or leakage of fluid.  Occ sudden cramping that immediately subsides. No contractions.    No fetal movement.       No HA, visual changes, RUQ or epigastric pain.   The patient presents with the following concerns: Started ASA. Did not finish last day of abx for uti due to nausea, so feeling worried that uti did not clear. Having some discomfort with urination and frequency but unsure if frequency is just related to being pregnant. Agreeable to repeat urine culture today.   Level II US  Scheduled.        Objective:  Vitals:    07/10/19 0947   BP: 111/71   BP Location: Left arm   Patient Position: Chair   Pulse: 74   Weight: 75.6 kg (166 lb 11.2 oz)   Height: 1.651 m (5' 5\")     See OB flowsheet    Assessment/Plan     Encounter Diagnoses   Name Primary?     High-risk pregnancy, unspecified trimester Yes     History of severe pre-eclampsia      History of  delivery      Dysuria        Orders Placed This Encounter   Medications     FOLIC ACID PO     Sig: Take 1 mg by mouth daily       - Reviewed total weight gain, encouraged continued healthy diet and exercise.      - Reviewed why/how to contact provider.    Patient education/orders or handouts today: fetal movement.    Return to clinic in 4 weeks and prn if questions or concerns.   HERNAN Carlson CNM                  "

## 2019-07-10 NOTE — LETTER
Date:July 15, 2019      Patient was self referred, no letter generated. Do not send.        [facility] Health Information

## 2019-07-10 NOTE — LETTER
"7/10/2019       RE: Ameena Cardoza  [address] [facility] [address]     Dear Colleague,    Thank you for referring your patient, Ameena Cardoza, to the [facility] at [facility]. Please see a copy of my visit note below.    Subjective:      28 year old  at 13w0d presents for a routine prenatal appointment.       No vaginal bleeding or leakage of fluid.  Occ sudden cramping that immediately subsides. No contractions.    No fetal movement.       No HA, visual changes, RUQ or epigastric pain.   The patient presents with the following concerns: Started ASA. Did not finish last day of abx for uti due to nausea, so feeling worried that uti did not clear. Having some discomfort with urination and frequency but unsure if frequency is just related to being pregnant. Agreeable to repeat urine culture today.   Level II US  Scheduled.        Objective:  Vitals:    07/10/19 0947   BP: 111/71   BP Location: Left arm   Patient Position: Chair   Pulse: 74   Weight: 75.6 kg (166 lb 11.2 oz)   Height: 1.651 m (5' 5\")     See OB flowsheet    Assessment/Plan     Encounter Diagnoses   Name Primary?     High-risk pregnancy, unspecified trimester Yes     History of severe pre-eclampsia      History of  delivery      Dysuria        Orders Placed This Encounter   Medications     FOLIC ACID PO     Sig: Take 1 mg by mouth daily       - Reviewed total weight gain, encouraged continued healthy diet and exercise.      - Reviewed why/how to contact provider.    Patient education/orders or handouts today: fetal movement.    Return to clinic in 4 weeks and prn if questions or concerns.   HERNAN Carlson CNM                    Again, thank you for allowing me to participate in the care of your patient.      Sincerely,    HERNAN Carlson CNM      "

## 2019-07-11 LAB
BACTERIA SPEC CULT: NO GROWTH
Lab: NORMAL
SPECIMEN SOURCE: NORMAL

## 2019-07-16 ENCOUNTER — TELEPHONE (OUTPATIENT)
Dept: OBGYN | Facility: CLINIC | Age: 29
End: 2019-07-16

## 2019-07-16 DIAGNOSIS — O09.90 HIGH-RISK PREGNANCY, UNSPECIFIED TRIMESTER: ICD-10-CM

## 2019-07-16 DIAGNOSIS — R30.9 VOIDING PAIN: Primary | ICD-10-CM

## 2019-07-16 NOTE — TELEPHONE ENCOUNTER
Spoke to Marcia who is 14 wks pregnant and complaining of pain with voiding. She does have a HX of UTI's when not pregnant,usually r/t post coital. She reports had negative urine test about 6 days ago, but over the last couple days s/s have worsened and wondering if safe to take pyridium.  Spoke to on call CNM and will have Marcia be seen tomorrow in clinic. UA/UC ordered . Marcia works over by [employer] so will go to that lab to give urine sample and provider reports pyridium safe to take during pregnancy.Pt indicated understanding and agreed with plan.

## 2019-07-16 NOTE — TELEPHONE ENCOUNTER
----- Message from King Mendosanick sent at 7/16/2019  1:35 PM CDT -----  Regarding: OB PT has UTI  Contact: [phone]  PT would like a call back.  She has been experiencing UTI symptoms for 2 days.  Please follow up.     Thank you,  King    Call Center

## 2019-07-16 NOTE — PROGRESS NOTES
"Subjective:     28 year old  at 14w0d presents for a problem visit related to UTIs. She denies vaginal bleeding or leakage of fluid.  Denies contractions or cramping. No fetal movement yet.   No HA, visual changes, RUQ or epigastric pain.   The patient presents with the following concerns: Has significant s/s of UTI today. Has a hx of UTIs with IC in college when she was \"very\" sexually active. Reports that she has had a very high sex drive since becoming pregnant and has had IC daily for the past several weeks which has had increased s/s of UTI. She has treated for UTI already this pg with Keflex but cut tx short by 2 pills as it was making her early pregnancy nausea worse. Her nausea has notably improved.      Objective:  Vitals:    19 1402   BP: 106/69   Pulse: 76   Temp: 97.7  F (36.5  C)   Weight: 76.2 kg (168 lb)    See OB flowsheet    Assessment/Plan     Encounter Diagnosis   Name Primary?     High-risk pregnancy, unspecified trimester Yes     No orders of the defined types were placed in this encounter.    Orders Placed This Encounter   Medications     DISCONTD: ASPIR-LOW 81 MG EC tablet     Si tablet daily     Refill:  3     phenazopyridine (AZO URINARY PAIN RELIEF) 95 MG tablet     Sig: Take 190 mg by mouth 3 times daily     - Reviewed total weight gain, encouraged continued healthy diet and exercise.      - Dr Cavazos consulted and recommended ongoing prophylaxis during pregnancy given Marcia's history. Reviewed this recommendation with Marcia who agrees with this plan. Rx placed.   - Reviewed why/how to contact provider.    Patient education/orders or handouts today:  Reviewed over-hydration and vaginal health for UTI prophalaxis.  - Return to clinic in for scheduled HERB and sooner/prn if s/s of UTI are not resolving.     Annabel Avalos CNM    "

## 2019-07-17 ENCOUNTER — OFFICE VISIT (OUTPATIENT)
Dept: OBGYN | Facility: CLINIC | Age: 29
End: 2019-07-17
Attending: ADVANCED PRACTICE MIDWIFE
Payer: COMMERCIAL

## 2019-07-17 VITALS
DIASTOLIC BLOOD PRESSURE: 69 MMHG | HEART RATE: 76 BPM | TEMPERATURE: 97.7 F | SYSTOLIC BLOOD PRESSURE: 106 MMHG | BODY MASS INDEX: 27.96 KG/M2 | WEIGHT: 168 LBS

## 2019-07-17 DIAGNOSIS — O23.40 URINARY TRACT INFECTION AFFECTING PREGNANCY: ICD-10-CM

## 2019-07-17 DIAGNOSIS — O09.90 HIGH-RISK PREGNANCY, UNSPECIFIED TRIMESTER: Primary | ICD-10-CM

## 2019-07-17 DIAGNOSIS — B37.31 YEAST INFECTION OF THE VAGINA: ICD-10-CM

## 2019-07-17 LAB
ALBUMIN UR-MCNC: NEGATIVE MG/DL
APPEARANCE UR: ABNORMAL
BACTERIA #/AREA URNS HPF: ABNORMAL /HPF
BILIRUB UR QL STRIP: ABNORMAL
COLOR UR AUTO: ABNORMAL
GLUCOSE UR STRIP-MCNC: NEGATIVE MG/DL
HGB UR QL STRIP: ABNORMAL
KETONES UR STRIP-MCNC: NEGATIVE MG/DL
LEUKOCYTE ESTERASE UR QL STRIP: ABNORMAL
NITRATE UR QL: POSITIVE
PH UR STRIP: 6 PH (ref 5–7)
RBC #/AREA URNS AUTO: 24 /HPF (ref 0–2)
RENAL EPI CELLS #/AREA URNS HPF: 1 /HPF
SOURCE: ABNORMAL
SP GR UR STRIP: 1.01 (ref 1–1.03)
SQUAMOUS #/AREA URNS AUTO: 6 /HPF (ref 0–1)
TRANS CELLS #/AREA URNS HPF: 1 /HPF (ref 0–1)
UROBILINOGEN UR STRIP-MCNC: 4 MG/DL (ref 0–2)
WBC #/AREA URNS AUTO: >182 /HPF (ref 0–5)

## 2019-07-17 PROCEDURE — 81001 URINALYSIS AUTO W/SCOPE: CPT | Performed by: ADVANCED PRACTICE MIDWIFE

## 2019-07-17 PROCEDURE — G0463 HOSPITAL OUTPT CLINIC VISIT: HCPCS | Mod: ZF

## 2019-07-17 PROCEDURE — 87086 URINE CULTURE/COLONY COUNT: CPT | Performed by: ADVANCED PRACTICE MIDWIFE

## 2019-07-17 PROCEDURE — 87186 SC STD MICRODIL/AGAR DIL: CPT | Performed by: ADVANCED PRACTICE MIDWIFE

## 2019-07-17 PROCEDURE — 87088 URINE BACTERIA CULTURE: CPT | Performed by: ADVANCED PRACTICE MIDWIFE

## 2019-07-17 RX ORDER — ASPIRIN 81 MG/1
1 TABLET ORAL DAILY
Refills: 3 | COMMUNITY
Start: 2019-06-28 | End: 2019-07-17

## 2019-07-17 RX ORDER — PHENAZOPYRIDINE HYDROCHLORIDE 95 MG/1
190 TABLET ORAL 3 TIMES DAILY
COMMUNITY
End: 2019-07-26

## 2019-07-17 RX ORDER — MICONAZOLE NITRATE 2 %
1 CREAM WITH APPLICATOR VAGINAL AT BEDTIME
Qty: 45 G | Refills: 1 | Status: SHIPPED | OUTPATIENT
Start: 2019-07-17 | End: 2019-07-26

## 2019-07-17 RX ORDER — CEPHALEXIN 500 MG/1
500 CAPSULE ORAL 2 TIMES DAILY
Qty: 20 CAPSULE | Refills: 0 | Status: SHIPPED | OUTPATIENT
Start: 2019-07-17 | End: 2019-07-26

## 2019-07-17 RX ORDER — CEPHALEXIN 500 MG/1
500 CAPSULE ORAL 2 TIMES DAILY
Qty: 20 CAPSULE | Refills: 0 | Status: SHIPPED | OUTPATIENT
Start: 2019-07-17 | End: 2019-07-17

## 2019-07-17 ASSESSMENT — PAIN SCALES - GENERAL: PAINLEVEL: MILD PAIN (2)

## 2019-07-17 NOTE — LETTER
Date:July 22, 2019      Patient was self referred, no letter generated. Do not send.        [facility] Health Information

## 2019-07-17 NOTE — PATIENT INSTRUCTIONS
BIRTH AND 17-alpha hydroxyprogesterone caproate (17P) TREATMENT    What is  birth?      birth is the delivery of a baby before 37 weeks of gestation.  Approximately 1 in every 12 babies in [place] is born premature (that s approximately 6,000 babies every year)!     birth is often unexpected, but can happen for many reasons. There are some known risk factors, which include:   -pregnancy with twins or triplets   -being  race  -some problems with the uterus or cervix   -some medical problems like high blood pressure   -smoking, drinking, or using illegal drugs while pregnant   -infections like urinary tract infection, bacterial vaginosis and chlamydia while    pregnant  -depression, stress, and anxiety    But the biggest risk factor is having a previous  baby. In fact, women who have one  birth have a 30-50% chance of their next baby coming early too.     What are the risks to a baby that delivers prematurely?     birth is the number one cause of  death worldwide. This risk increases the earlier the baby is born.  Prematurity can also cause serious long term health problems for babies such as breathing problems, infections, bleeding into the brain, as well as long term developmental problems like cerebral palsy, learning impairments, hearing and vision problems.    How can I prevent  birth in my pregnancy?  Overall, the best thing to prevent  delivery is to stay healthy during your pregnancy, and follow up with your doctor for your regular visits and screening tests.  If you have a history of  birth in one of your past pregnancies, you may benefit from weekly injections of 17P.     What is 17P?  The full name is 17-alpha hydroxyprogesterone caproate. This is a form of your body s natural  pregnancy hormone , progesterone. The brand name of this is Ava, and it is FDA approved for prevention of  birth.  This medication  is given in once weekly injections from week 16-20 through the 36th week of pregnancy. Research shows that women taking 17P can reduce their risk of  birth from 50% to 36%. The treatment has also been shown to reduce the risk of complications after birth, such as bleeding in the brain and need for supplemental oxygen.    It is important to complete the treatment once you start, as the risk of  birth goes up if you stop the injections early.    How can I get started on the Ava treatment?  First, you should talk with your doctor to find out if this is a good option for you.  It is safe for pregnant women and for the fetus, but if you have some medical problems like uncontrolled blood pressure, breast cancer, or liver disease you should talk about it with your doctor first.  Your clinic will work with you to help determine insurance coverage and preauthorization if needed.  Then you will schedule weekly visits for 17P injections in addition to your routine prenatal visits with your doctor.    Side Effects of Mekena  The most common side effects  reported by [place] users are   injection site reactions (pain [35%], swelling  [17%], pruritus (itching) [6%], nodule [5%]), urticaria (rash) (12%), pruritus (generalized itching (8%), nausea (6%), and diarrhea (2%).    BIRTH AND 17-alpha hydroxyprogesterone caproate (17P) TREATMENT    What is  birth?      birth is the delivery of a baby before 37 weeks of gestation.  Approximately 1 in every 12 babies in [place] is born premature (that s approximately 6,000 babies every year)!     birth is often unexpected, but can happen for many reasons. There are some known risk factors, which include:   -pregnancy with twins or triplets   -being  race  -some problems with the uterus or cervix   -some medical problems like high blood pressure   -smoking, drinking, or using illegal drugs while pregnant   -infections like urinary tract  infection, bacterial vaginosis and chlamydia while    pregnant  -depression, stress, and anxiety    But the biggest risk factor is having a previous  baby. In fact, women who have one  birth have a 30-50% chance of their next baby coming early too.     What are the risks to a baby that delivers prematurely?     birth is the number one cause of  death worldwide. This risk increases the earlier the baby is born.  Prematurity can also cause serious long term health problems for babies such as breathing problems, infections, bleeding into the brain, as well as long term developmental problems like cerebral palsy, learning impairments, hearing and vision problems.    How can I prevent  birth in my pregnancy?  Overall, the best thing to prevent  delivery is to stay healthy during your pregnancy, and follow up with your doctor for your regular visits and screening tests.  If you have a history of  birth in one of your past pregnancies, you may benefit from weekly injections of 17P.     What is 17P?  The full name is 17-alpha hydroxyprogesterone caproate. This is a form of your body s natural  pregnancy hormone , progesterone. The brand name of this is Ava, and it is FDA approved for prevention of  birth.  This medication is given in once weekly injections from week 16-20 through the 36th week of pregnancy. Research shows that women taking 17P can reduce their risk of  birth from 50% to 36%. The treatment has also been shown to reduce the risk of complications after birth, such as bleeding in the brain and need for supplemental oxygen.    It is important to complete the treatment once you start, as the risk of  birth goes up if you stop the injections early.    How can I get started on the [place] treatment?  First, you should talk with your doctor to find out if this is a good option for you.  It is safe for pregnant women and for the fetus, but if you  have some medical problems like uncontrolled blood pressure, breast cancer, or liver disease you should talk about it with your doctor first.  Your clinic will work with you to help determine insurance coverage and preauthorization if needed.  Then you will schedule weekly visits for 17P injections in addition to your routine prenatal visits with your doctor.    Side Effects of Mekena  The most common side effects  reported by [place] users are   injection site reactions (pain [35%], swelling  [17%], pruritus (itching) [6%], nodule [5%]), urticaria (rash) (12%), pruritus (generalized itching (8%), nausea (6%), and diarrhea (2%).

## 2019-07-17 NOTE — LETTER
"2019       RE: Ameena Cardoza  [address] [facility] [facility] [address]     Dear Colleague,    Thank you for referring your patient, Ameena Cardoza, to the [facility] at [facility]. Please see a copy of my visit note below.    Subjective:     28 year old  at 14w0d presents for a problem visit related to UTIs. She denies vaginal bleeding or leakage of fluid.  Denies contractions or cramping. No fetal movement yet.   No HA, visual changes, RUQ or epigastric pain.   The patient presents with the following concerns: Has significant s/s of UTI today. Has a hx of UTIs with IC in college when she was \"very\" sexually active. Reports that she has had a very high sex drive since becoming pregnant and has had IC daily for the past several weeks which has had increased s/s of UTI. She has treated for UTI already this pg with Keflex but cut tx short by 2 pills as it was making her early pregnancy nausea worse. Her nausea has notably improved.      Objective:  Vitals:    19 1402   BP: 106/69   Pulse: 76   Temp: 97.7  F (36.5  C)   Weight: 76.2 kg (168 lb)    See OB flowsheet    Assessment/Plan     Encounter Diagnosis   Name Primary?     High-risk pregnancy, unspecified trimester Yes     No orders of the defined types were placed in this encounter.    Orders Placed This Encounter   Medications     DISCONTD: ASPIR-LOW 81 MG EC tablet     Si tablet daily     Refill:  3     phenazopyridine (AZO URINARY PAIN RELIEF) 95 MG tablet     Sig: Take 190 mg by mouth 3 times daily     - Reviewed total weight gain, encouraged continued healthy diet and exercise.      - Dr Cavazos consulted and recommended ongoing prophylaxis during pregnancy given Marcia's history. Reviewed this recommendation with Marcia who agrees with this plan. Rx placed.   - Reviewed why/how to contact provider.    Patient education/orders or handouts today:  Reviewed over-hydration and " vaginal health for UTI prophalaxis.  - Return to clinic in for scheduled HERB and sooner/prn if s/s of UTI are not resolving.     Annabel Avalos CNM      Again, thank you for allowing me to participate in the care of your patient.      Sincerely,    Annabel Avalos CNM

## 2019-07-18 LAB
BACTERIA SPEC CULT: NORMAL
Lab: NORMAL
SPECIMEN SOURCE: NORMAL

## 2019-07-19 LAB
BACTERIA SPEC CULT: ABNORMAL
BACTERIA SPEC CULT: ABNORMAL
Lab: ABNORMAL
SPECIMEN SOURCE: ABNORMAL

## 2019-07-26 ENCOUNTER — OFFICE VISIT (OUTPATIENT)
Dept: OBGYN | Facility: CLINIC | Age: 29
End: 2019-07-26
Attending: MIDWIFE
Payer: COMMERCIAL

## 2019-07-26 VITALS
BODY MASS INDEX: 27.92 KG/M2 | DIASTOLIC BLOOD PRESSURE: 74 MMHG | SYSTOLIC BLOOD PRESSURE: 109 MMHG | HEIGHT: 65 IN | WEIGHT: 167.6 LBS | HEART RATE: 84 BPM

## 2019-07-26 DIAGNOSIS — O09.90 HIGH-RISK PREGNANCY, UNSPECIFIED TRIMESTER: Primary | ICD-10-CM

## 2019-07-26 DIAGNOSIS — Z87.59 HISTORY OF SEVERE PRE-ECLAMPSIA: ICD-10-CM

## 2019-07-26 PROCEDURE — G0463 HOSPITAL OUTPT CLINIC VISIT: HCPCS | Mod: ZF

## 2019-07-26 ASSESSMENT — MIFFLIN-ST. JEOR: SCORE: 1481.11

## 2019-07-26 NOTE — LETTER
Date:July 29, 2019      Patient was self referred, no letter generated. Do not send.        [facility] Health Information

## 2019-07-26 NOTE — NURSING NOTE
Chief Complaint   Patient presents with     Prenatal Care     15w3d       See DIANA De Jesus 7/26/2019

## 2019-07-26 NOTE — PROGRESS NOTES
"Subjective:      28 year old  at 15w3d presents for a routine prenatal appointment.       no vaginal bleeding or leakage of fluid.  no contractions or cramping.    a little  fetal movement.       No HA, visual changes, RUQ or epigastric pain.   The patient presents with the following concerns: having occ mild frontal H/A discussed normal early second trim. Ok tylenol  Hydration, frequent snacks.  Disc magnesium oxide 400mg daily and Riboflavin 400mg daily for H/A  Has home monitoring BP cuff  Pt is nervous due to hx severe preeclampsia.  May check weekly  Or prn sx call any time    Level II US  Scheduled.      Objective:  Vitals:    19 0828   BP: 109/74   BP Location: Left arm   Patient Position: Chair   Pulse: 84   Weight: 76 kg (167 lb 9.6 oz)   Height: 1.651 m (5' 5\")     See OB flowsheet    Assessment/Plan     Encounter Diagnoses   Name Primary?     High-risk pregnancy, unspecified trimester Yes     History of severe pre-eclampsia      Completing antibiotics for UTI  And yeast cream 2 days ago. Dr. Cavazos consulted last visit and advised keflex prophylaxis after completion of course.  Will get UC and GC/CHlamydia next visit    - Reviewed total weight gain, encouraged continued healthy diet and exercise.      - Reviewed why/how to contact provider.    Patient education/orders or handouts today:  Level 2 u/s scheduled   Return to clinic in 5 weeks and prn if questions or concerns.   HERNAN Sebastian CNM                "

## 2019-07-26 NOTE — LETTER
"2019       RE: Ameena Cardoza  [address] [facility] [address]     Dear Colleague,    Thank you for referring your patient, Ameena Cardoza, to the [facility] at [facility]. Please see a copy of my visit note below.    Subjective:      28 year old  at 15w3d presents for a routine prenatal appointment.       no vaginal bleeding or leakage of fluid.  no contractions or cramping.    a little  fetal movement.       No HA, visual changes, RUQ or epigastric pain.   The patient presents with the following concerns: having occ mild frontal H/A discussed normal early second trim. Ok tylenol  Hydration, frequent snacks.  Disc magnesium oxide 400mg daily and Riboflavin 400mg daily for H/A  Has home monitoring BP cuff  Pt is nervous due to hx severe preeclampsia.  May check weekly  Or prn sx call any time    Level II US  Scheduled.      Objective:  Vitals:    19 0828   BP: 109/74   BP Location: Left arm   Patient Position: Chair   Pulse: 84   Weight: 76 kg (167 lb 9.6 oz)   Height: 1.651 m (5' 5\")     See OB flowsheet    Assessment/Plan     Encounter Diagnoses   Name Primary?     High-risk pregnancy, unspecified trimester Yes     History of severe pre-eclampsia      Completing antibiotics for UTI  And yeast cream 2 days ago. Dr. Cavazos consulted last visit and advised keflex prophylaxis after completion of course.  Will get UC and GC/CHlamydia next visit    - Reviewed total weight gain, encouraged continued healthy diet and exercise.      - Reviewed why/how to contact provider.    Patient education/orders or handouts today:  Level 2 u/s scheduled   Return to clinic in 5 weeks and prn if questions or concerns.   HERNAN Sebastian CNM                  Again, thank you for allowing me to participate in the care of your patient.      Sincerely,    HERNAN Sebastian CNM      "

## 2019-08-06 ENCOUNTER — TELEPHONE (OUTPATIENT)
Dept: OBGYN | Facility: CLINIC | Age: 29
End: 2019-08-06

## 2019-08-06 NOTE — TELEPHONE ENCOUNTER
"Marcia called to report moderate headache in pregnancy in the past 1.5 weeks, which is worse while looking at a computer screen at work. Improves in evening when she is not looking at a screen. Denies history of migraine. Endorses history of Pre E, took blood pressure at home today which was normal, 110/60. Pt reports drinking approximately 50-60 oz water per day. Pt reports one episode of \"stars\" in vision upon standing this morning.    Advised patient to increase water intake to  oz per day, Take tylenol (650mg Q 6 hours, not to exceed 3000mg per 24 hours). If headache does not improve, worsens, or develops increase in BP (greater than 140/90), blurred vision, one sided headache pain should present to ED. If headache improves with hydration and tylenol, advised should schedule appt with internal medicine.     If any further questions or concerns call triage.     Pt agrees with plan and has no further questions. Coordinated appointment on 8/12 with Dr. Masood Hernandez  "

## 2019-08-12 ENCOUNTER — OFFICE VISIT (OUTPATIENT)
Dept: INTERNAL MEDICINE | Facility: CLINIC | Age: 29
End: 2019-08-12
Attending: OBSTETRICS & GYNECOLOGY
Payer: COMMERCIAL

## 2019-08-12 ENCOUNTER — PRE VISIT (OUTPATIENT)
Dept: MATERNAL FETAL MEDICINE | Facility: CLINIC | Age: 29
End: 2019-08-12

## 2019-08-12 VITALS
WEIGHT: 167.6 LBS | BODY MASS INDEX: 27.92 KG/M2 | HEART RATE: 73 BPM | SYSTOLIC BLOOD PRESSURE: 111 MMHG | DIASTOLIC BLOOD PRESSURE: 69 MMHG | HEIGHT: 65 IN

## 2019-08-12 DIAGNOSIS — R51.9 PREGNANCY HEADACHE IN SECOND TRIMESTER: Primary | ICD-10-CM

## 2019-08-12 DIAGNOSIS — O26.892 PREGNANCY HEADACHE IN SECOND TRIMESTER: Primary | ICD-10-CM

## 2019-08-12 RX ORDER — SUMATRIPTAN 50 MG/1
50 TABLET, FILM COATED ORAL
Qty: 12 TABLET | Refills: 3 | Status: SHIPPED | OUTPATIENT
Start: 2019-08-12 | End: 2019-10-29

## 2019-08-12 ASSESSMENT — PATIENT HEALTH QUESTIONNAIRE - PHQ9
SUM OF ALL RESPONSES TO PHQ QUESTIONS 1-9: 3
5. POOR APPETITE OR OVEREATING: NOT AT ALL

## 2019-08-12 ASSESSMENT — ANXIETY QUESTIONNAIRES
1. FEELING NERVOUS, ANXIOUS, OR ON EDGE: SEVERAL DAYS
3. WORRYING TOO MUCH ABOUT DIFFERENT THINGS: NOT AT ALL
7. FEELING AFRAID AS IF SOMETHING AWFUL MIGHT HAPPEN: SEVERAL DAYS
2. NOT BEING ABLE TO STOP OR CONTROL WORRYING: NOT AT ALL
5. BEING SO RESTLESS THAT IT IS HARD TO SIT STILL: NOT AT ALL
GAD7 TOTAL SCORE: 2
6. BECOMING EASILY ANNOYED OR IRRITABLE: NOT AT ALL

## 2019-08-12 ASSESSMENT — MIFFLIN-ST. JEOR: SCORE: 1481.11

## 2019-08-12 ASSESSMENT — PAIN SCALES - GENERAL: PAINLEVEL: NO PAIN (0)

## 2019-08-12 NOTE — PROGRESS NOTES
SUBJECTIVE:  Ameena Cardoza is a 28 year old female who comes in for evaluation of headache. Has been going on for the past two weeks. Has taken tylenol, which dulls it some. Brought on by computer usage. Sometimes gets dizzy/sees stars when stands up. No nausea/vomiting. Photophobia. No phonophobia. Always behind both eyes and frontal sinuses. No hx of migraines. Did drink a lot of caffeine before pregnant, cut back (4-6oz fo coffee from 20 oz).     Past Medical History:   Diagnosis Date     Family history of breast cancer gene mutation in first degree relative     gene tested CHek 2 associated with increased risk  q6 mos mammo starting at 33      HELLP (hemolytic anemia/elev liver enzymes/low platelets in pregnancy)      PCOS (polycystic ovarian syndrome)     age 12 no treatment necessary      Preeclampsia, severe, third trimester     HTN, proteinuria, 30wks-->mag, IOL x 24 hrs->C/S     Past Surgical History:   Procedure Laterality Date      SECTION      30 wks gestation, HTN, induction->C/S,      Family History   Problem Relation Age of Onset     Breast Cancer Paternal Grandmother         age 40-x 2 bouts     Hypertension Father 45     Breast Cancer Maternal Aunt         40     Breast Cancer Maternal Aunt         40     Diabetes Paternal Grandfather      Social History     Socioeconomic History     Marital status:      Spouse name: Red     Number of children: 1     Years of education: Not on file     Highest education level: Not on file   Occupational History     Comment: advisor, undergrad, learning abroad   Social Needs     Financial resource strain: Not on file     Food insecurity:     Worry: Not on file     Inability: Not on file     Transportation needs:     Medical: Not on file     Non-medical: Not on file   Tobacco Use     Smoking status: Never Smoker     Smokeless tobacco: Never Used   Substance and Sexual Activity     Alcohol use: Not Currently      "Drug use: Not Currently     Sexual activity: Yes     Partners: Male   Lifestyle     Physical activity:     Days per week: Not on file     Minutes per session: Not on file     Stress: Not on file   Relationships     Social connections:     Talks on phone: Not on file     Gets together: Not on file     Attends Episcopalian service: Not on file     Active member of club or organization: Not on file     Attends meetings of clubs or organizations: Not on file     Relationship status: Not on file     Intimate partner violence:     Fear of current or ex partner: Not on file     Emotionally abused: Not on file     Physically abused: Not on file     Forced sexual activity: Not on file   Other Topics Concern     Not on file   Social History Narrative     Not on file         Medications and allergies reviewed by me today.     ROS:   Constitutional, neuro, ENT, endocrine, pulmonary, cardiac, gastrointestinal, genitourinary, musculoskeletal, integument and psychiatric systems are negative, except as otherwise noted.    OBJECTIVE:    /69   Pulse 73   Ht 1.651 m (5' 5\")   Wt 76 kg (167 lb 9.6 oz)   LMP 02/04/2019 (Approximate)   BMI 27.89 kg/m     Wt Readings from Last 1 Encounters:   08/12/19 76 kg (167 lb 9.6 oz)     Gen: pleasant female, in NAD  Eyes: EOMI, PERRL  ENT: Oropharynx clear, MMM  Neck: No LAD, no cervical muscle tenderness  Resp: lungs CAB  CV: Heart RRR, no MRG  Abd: Soft, gravid, NT, ND, nl bowel sounds  Ext: WWP, no LE edema  Skin: Warm, dry, no rash  Neuro: AOx3, CN II-XII intact, 5/5 strength upper/lower extremities       ASSESSMENT/PLAN:    Ameena was seen today for consult.    Diagnoses and all orders for this visit:    Pregnancy headache in second trimester  -     SUMAtriptan (IMITREX) 50 MG tablet; Take 1 tablet (50 mg) by mouth at onset of headache for migraine May repeat in 2 hours. Max 4 tablets/24 hours.  To Prevent Migraines:   --400 mg Magnesium Oxide before bed.   --Vitamin B2, 400 mg " daily  --Co Q10, 100-250mg daily    Also try to reduce screen glare/eye strain. Discussed methods to help with this.    If the above isn't helpful, call and we can try to get her into neuro-headache clinic.     Pt should return to clinic for f/u with me in PRN      Yeimi Hernandez MD  08/12/19

## 2019-08-12 NOTE — LETTER
2019       RE: Ameena Cardoza  [address] [facility] [address]     Dear Colleague,    Thank you for referring your patient, Ameena Cardoza, to the [facility]  at [facility]. Please see a copy of my visit note below.                           SUBJECTIVE:  Ameena Cardoza is a 28 year old female who comes in for evaluation of headache. Has been going on for the past two weeks. Has taken tylenol, which dulls it some. Brought on by computer usage. Sometimes gets dizzy/sees stars when stands up. No nausea/vomiting. Photophobia. No phonophobia. Always behind both eyes and frontal sinuses. No hx of migraines. Did drink a lot of caffeine before pregnant, cut back (4-6oz fo coffee from 20 oz).     Past Medical History:   Diagnosis Date     Family history of breast cancer gene mutation in first degree relative     gene tested CHek 2 associated with increased risk  q6 mos mammo starting at 33      HELLP (hemolytic anemia/elev liver enzymes/low platelets in pregnancy)      PCOS (polycystic ovarian syndrome)     age 12 no treatment necessary      Preeclampsia, severe, third trimester     HTN, proteinuria, 30wks-->mag, IOL x 24 hrs->C/S     Past Surgical History:   Procedure Laterality Date      SECTION      30 wks gestation, HTN, induction->C/S,      Family History   Problem Relation Age of Onset     Breast Cancer Paternal Grandmother         age 40-x 2 bouts     Hypertension Father 45     Breast Cancer Maternal Aunt         40     Breast Cancer Maternal Aunt         40     Diabetes Paternal Grandfather      Social History     Socioeconomic History     Marital status:      Spouse name: Red     Number of children: 1     Years of education: Not on file     Highest education level: Not on file   Occupational History     Comment: advisor, undergrad, learning abroad   Social Needs     Financial resource strain: Not on file      "Food insecurity:     Worry: Not on file     Inability: Not on file     Transportation needs:     Medical: Not on file     Non-medical: Not on file   Tobacco Use     Smoking status: Never Smoker     Smokeless tobacco: Never Used   Substance and Sexual Activity     Alcohol use: Not Currently     Drug use: Not Currently     Sexual activity: Yes     Partners: Male   Lifestyle     Physical activity:     Days per week: Not on file     Minutes per session: Not on file     Stress: Not on file   Relationships     Social connections:     Talks on phone: Not on file     Gets together: Not on file     Attends Jehovah's witness service: Not on file     Active member of club or organization: Not on file     Attends meetings of clubs or organizations: Not on file     Relationship status: Not on file     Intimate partner violence:     Fear of current or ex partner: Not on file     Emotionally abused: Not on file     Physically abused: Not on file     Forced sexual activity: Not on file   Other Topics Concern     Not on file   Social History Narrative     Not on file         Medications and allergies reviewed by me today.     ROS:   Constitutional, neuro, ENT, endocrine, pulmonary, cardiac, gastrointestinal, genitourinary, musculoskeletal, integument and psychiatric systems are negative, except as otherwise noted.    OBJECTIVE:    /69   Pulse 73   Ht 1.651 m (5' 5\")   Wt 76 kg (167 lb 9.6 oz)   LMP 02/04/2019 (Approximate)   BMI 27.89 kg/m      Wt Readings from Last 1 Encounters:   08/12/19 76 kg (167 lb 9.6 oz)     Gen: pleasant female, in NAD  Eyes: EOMI, PERRL  ENT: Oropharynx clear, MMM  Neck: No LAD, no cervical muscle tenderness  Resp: lungs CAB  CV: Heart RRR, no MRG  Abd: Soft, gravid, NT, ND, nl bowel sounds  Ext: WWP, no LE edema  Skin: Warm, dry, no rash  Neuro: AOx3, CN II-XII intact, 5/5 strength upper/lower extremities       ASSESSMENT/PLAN:    Ameena was seen today for consult.    Diagnoses and all orders for this " visit:    Pregnancy headache in second trimester  -SUMAtriptan (IMITREX) 50 MG tablet; Take 1 tablet (50 mg) by mouth at onset of headache for migraine May repeat in 2 hours. Max 4 tablets/24 hours.  To Prevent Migraines:   --400 mg Magnesium Oxide before bed.   --Vitamin B2, 400 mg daily  --Co Q10, 100-250mg daily    Also try to reduce screen glare/eye strain. Discussed methods to help with this.    If the above isn't helpful, call and we can try to get her into neuro-headache clinic.     Pt should return to clinic for f/u with me in PRN      Yeimi Hernandez MD  08/12/19

## 2019-08-12 NOTE — PATIENT INSTRUCTIONS
To Prevent Migraines:     --400 mg Magnesium Oxide before bed.   --Vitamin B2, 400 mg daily  --Co Q10, 100-250mg daily

## 2019-08-12 NOTE — NURSING NOTE
Chief Complaint   Patient presents with     Consult     18 week ob visit for headaches   never had headaches prior to pregnancy   Now having headaches every day migraines - light senistive   Tylenol helps but doesn't completely take it away.

## 2019-08-13 ASSESSMENT — ANXIETY QUESTIONNAIRES: GAD7 TOTAL SCORE: 2

## 2019-08-14 ENCOUNTER — OFFICE VISIT (OUTPATIENT)
Dept: MATERNAL FETAL MEDICINE | Facility: CLINIC | Age: 29
End: 2019-08-14
Attending: ADVANCED PRACTICE MIDWIFE
Payer: COMMERCIAL

## 2019-08-14 ENCOUNTER — HOSPITAL ENCOUNTER (OUTPATIENT)
Dept: ULTRASOUND IMAGING | Facility: CLINIC | Age: 29
Discharge: HOME | End: 2019-08-14
Attending: ADVANCED PRACTICE MIDWIFE | Admitting: ADVANCED PRACTICE MIDWIFE
Payer: COMMERCIAL

## 2019-08-14 ENCOUNTER — TELEPHONE (OUTPATIENT)
Dept: OTHER | Facility: CLINIC | Age: 29
End: 2019-08-14

## 2019-08-14 DIAGNOSIS — O09.299 HISTORY OF PRE-ECLAMPSIA IN PRIOR PREGNANCY, CURRENTLY PREGNANT: Primary | ICD-10-CM

## 2019-08-14 DIAGNOSIS — O26.90 PREGNANCY RELATED CONDITION, ANTEPARTUM: ICD-10-CM

## 2019-08-14 DIAGNOSIS — O35.9XX0 SUSPECTED FETAL ANOMALY, ANTEPARTUM, SINGLE OR UNSPECIFIED FETUS: ICD-10-CM

## 2019-08-14 PROCEDURE — 76811 OB US DETAILED SNGL FETUS: CPT

## 2019-08-14 PROCEDURE — 76805 OB US >/= 14 WKS SNGL FETUS: CPT

## 2019-08-14 NOTE — PROGRESS NOTES
Please see ultrasound report under imaging tab for details on ultrasound performed today.    Rosalia Ayers MD  , OB/GYN  Maternal-Fetal Medicine  [email or web address]  [phone] (Academic office)  [phone] (Pager)

## 2019-09-05 ENCOUNTER — OFFICE VISIT (OUTPATIENT)
Dept: MATERNAL FETAL MEDICINE | Facility: CLINIC | Age: 29
End: 2019-09-05
Attending: OBSTETRICS & GYNECOLOGY
Payer: COMMERCIAL

## 2019-09-05 ENCOUNTER — HOSPITAL ENCOUNTER (OUTPATIENT)
Dept: ULTRASOUND IMAGING | Facility: CLINIC | Age: 29
Discharge: HOME | End: 2019-09-05
Attending: OBSTETRICS & GYNECOLOGY | Admitting: OBSTETRICS & GYNECOLOGY
Payer: COMMERCIAL

## 2019-09-05 DIAGNOSIS — O35.9XX0 SUSPECTED FETAL ANOMALY, ANTEPARTUM, SINGLE OR UNSPECIFIED FETUS: ICD-10-CM

## 2019-09-05 DIAGNOSIS — O09.299 HISTORY OF PRE-ECLAMPSIA IN PRIOR PREGNANCY, CURRENTLY PREGNANT: Primary | ICD-10-CM

## 2019-09-05 PROCEDURE — 76816 OB US FOLLOW-UP PER FETUS: CPT

## 2019-09-05 NOTE — PROGRESS NOTES
"Please see \"Imaging\" tab under \"Chart Review\" for details of today's US.    Luda Orona, DO    "

## 2019-09-16 ENCOUNTER — OFFICE VISIT (OUTPATIENT)
Dept: OBGYN | Facility: CLINIC | Age: 29
End: 2019-09-16
Attending: ADVANCED PRACTICE MIDWIFE
Payer: COMMERCIAL

## 2019-09-16 VITALS
HEIGHT: 65 IN | WEIGHT: 167.4 LBS | SYSTOLIC BLOOD PRESSURE: 111 MMHG | HEART RATE: 75 BPM | BODY MASS INDEX: 27.89 KG/M2 | DIASTOLIC BLOOD PRESSURE: 67 MMHG

## 2019-09-16 DIAGNOSIS — O09.90 HIGH-RISK PREGNANCY, UNSPECIFIED TRIMESTER: Primary | ICD-10-CM

## 2019-09-16 DIAGNOSIS — Z23 NEED FOR IMMUNIZATION AGAINST INFLUENZA: ICD-10-CM

## 2019-09-16 LAB
ALBUMIN UR-MCNC: NEGATIVE MG/DL
APPEARANCE UR: CLEAR
BILIRUB UR QL STRIP: NEGATIVE
COLOR UR AUTO: ABNORMAL
GLUCOSE UR STRIP-MCNC: NEGATIVE MG/DL
HGB UR QL STRIP: NEGATIVE
KETONES UR STRIP-MCNC: NEGATIVE MG/DL
LEUKOCYTE ESTERASE UR QL STRIP: NEGATIVE
NITRATE UR QL: NEGATIVE
PH UR STRIP: 7 PH (ref 5–7)
RBC #/AREA URNS AUTO: <1 /HPF (ref 0–2)
SOURCE: ABNORMAL
SP GR UR STRIP: 1 (ref 1–1.03)
SQUAMOUS #/AREA URNS AUTO: 3 /HPF (ref 0–1)
UROBILINOGEN UR STRIP-MCNC: NORMAL MG/DL (ref 0–2)
WBC #/AREA URNS AUTO: 1 /HPF (ref 0–5)

## 2019-09-16 PROCEDURE — 87086 URINE CULTURE/COLONY COUNT: CPT | Performed by: ADVANCED PRACTICE MIDWIFE

## 2019-09-16 PROCEDURE — 81001 URINALYSIS AUTO W/SCOPE: CPT | Performed by: ADVANCED PRACTICE MIDWIFE

## 2019-09-16 PROCEDURE — 87491 CHLMYD TRACH DNA AMP PROBE: CPT | Performed by: ADVANCED PRACTICE MIDWIFE

## 2019-09-16 PROCEDURE — 25000128 H RX IP 250 OP 636: Mod: ZF

## 2019-09-16 PROCEDURE — 90686 IIV4 VACC NO PRSV 0.5 ML IM: CPT | Mod: ZF

## 2019-09-16 PROCEDURE — G0008 ADMIN INFLUENZA VIRUS VAC: HCPCS | Mod: ZF

## 2019-09-16 PROCEDURE — G0463 HOSPITAL OUTPT CLINIC VISIT: HCPCS | Mod: 25,ZF

## 2019-09-16 PROCEDURE — 87591 N.GONORRHOEAE DNA AMP PROB: CPT | Performed by: ADVANCED PRACTICE MIDWIFE

## 2019-09-16 ASSESSMENT — PAIN SCALES - GENERAL: PAINLEVEL: NO PAIN (0)

## 2019-09-16 ASSESSMENT — MIFFLIN-ST. JEOR: SCORE: 1480.2

## 2019-09-16 NOTE — LETTER
"2019       RE: Ameena Cardoza  [address] [facility] [address]     Dear Colleague,    Thank you for referring your patient, Ameena Cardoza, to the [facility] at [facility]. Please see a copy of my visit note below.    Subjective:     28 year old  at 22w5d presents for a routine prenatal appointment.      No vaginal bleeding or leakage of fluid.  No contractions or cramping.   Positive fetal movement.       No HA, visual changes, RUQ or epigastric pain.   Is taking asa for pre-e prevention  Is not taking Keflex for chronic UTI. Will repeat UA/UC today.  The patient presents with the following concerns: Has been getting migraines.  Has not been taking Imitrex.  Chiropractic care is helping.  TWG -2lbs.  Pt reports a lot of nausea in early pregnancy which has resolved now.  She is also \"eating healthier\".  Level II US  Results reviewed normal scan. Reviewed normal mitul scan has repeat for better views and fetal growth in early Oct    Offered AFP.  Declined.      Objective:  Vitals:    19 0816   BP: 111/67   Pulse: 75   Weight: 75.9 kg (167 lb 6.4 oz)   Height: 1.651 m (5' 5\")   See OB flowsheet    Assessment/Plan     Encounter Diagnoses   Name Primary?     High-risk pregnancy, unspecified trimester Yes     Need for immunization against influenza      Orders Placed This Encounter   Procedures     US OB >14 Weeks Follow Up     FLU VAC PRESRV FREE QUAD SPLIT VIR 3+YRS IM     UA with Microscopic     - Reviewed total weight gain -2lbs. Encouraged increased caloric intake and expected weight gain at this stage in pregnancy.  Pt verbalizes understanding of consuming an extra 200-300 kcal/day and eating whole foods/no low fat.  -Collected UA/UC and GC/CT today  - Reviewed why/how to contact provider.    Patient education/orders or handouts today:  Plan for EOB visit w labs   - US scheduled for 10/3 at [facility], will attempt to reschedule to " have done in this office with prenatal visit to follow.   Return to clinic in 2.5 weeks and prn if questions or concerns.     I, Annabel Bennett, am serving as a scribe; to document services personally performed by  Susan Avalos based on data collection and the provider's statements to me.     Annabel Bennett    I agree with the PFSH and ROS as completed by the student, except for changes made by me. The remainder of the encounter was performed by me and scribed by the student. The scribed note accurately reflects my personal services and decisions made by me.    Annabel Avalos, DNP, CNM, APRN

## 2019-09-16 NOTE — PROGRESS NOTES
"Subjective:      28 year old  at 22w5d presents for a routine prenatal appointment.       No vaginal bleeding or leakage of fluid.  No contractions or cramping.    Positive fetal movement.       No HA, visual changes, RUQ or epigastric pain.   Is taking asa for pre-e prevention  Is not taking Keflex for chronic UTI. Will repeat UA/UC today.  The patient presents with the following concerns: Has been getting migraines.  Has not been taking Imitrex.  Chiropractic care is helping.  TWG -2lbs.  Pt reports a lot of nausea in early pregnancy which has resolved now.  She is also \"eating healthier\".  Level II US  Results reviewed normal scan. Reviewed normal mitul scan has repeat for better views and fetal growth in early Oct    Offered AFP.  Declined.      Objective:  Vitals:    19 0816   BP: 111/67   Pulse: 75   Weight: 75.9 kg (167 lb 6.4 oz)   Height: 1.651 m (5' 5\")     See OB flowsheet    Assessment/Plan     Encounter Diagnoses   Name Primary?     High-risk pregnancy, unspecified trimester Yes     Need for immunization against influenza      Orders Placed This Encounter   Procedures     US OB >14 Weeks Follow Up     FLU VAC PRESRV FREE QUAD SPLIT VIR 3+YRS IM     UA with Microscopic     - Reviewed total weight gain -2lbs. Encouraged increased caloric intake and expected weight gain at this stage in pregnancy.  Pt verbalizes understanding of consuming an extra 200-300 kcal/day and eating whole foods/no low fat.  -Collected UA/UC and GC/CT today  - Reviewed why/how to contact provider.    Patient education/orders or handouts today:  Plan for EOB visit w labs   - US scheduled for 10/3 at [facility], will attempt to reschedule to have done in this office with prenatal visit to follow.   Return to clinic in 2.5 weeks and prn if questions or concerns.     I, Annabel Bennett, am serving as a scribe; to document services personally performed by  Susan Avalos based on data collection and the provider's statements to me. "     Annabel Bennett    I agree with the PFSH and ROS as completed by the student, except for changes made by me. The remainder of the encounter was performed by me and scribed by the student. The scribed note accurately reflects my personal services and decisions made by me.    Annabel Avalos, WILLIE, CNM, APRN

## 2019-09-16 NOTE — NURSING NOTE
Clinic Administered Medication Documentation    MEDICATION LIST:   Injectable Medication Documentation    Patient was given influenza Prior to medication administration, verified patients identity using patient s name and date of birth. Please see MAR and medication order for additional information. Patient instructed to remain in clinic for 15 minutes.      Was entire vial of medication used? Yes  Vial/Syringe: Single dose vial  Expiration Date:  6-  Was this medication supplied by the patient? No

## 2019-09-17 LAB
BACTERIA SPEC CULT: NORMAL
C TRACH DNA SPEC QL NAA+PROBE: NEGATIVE
Lab: NORMAL
N GONORRHOEA DNA SPEC QL NAA+PROBE: NEGATIVE
SPECIMEN SOURCE: NORMAL

## 2019-09-18 PROBLEM — O23.40 UTI IN PREGNANCY: Status: ACTIVE | Noted: 2019-09-18

## 2019-10-03 ENCOUNTER — OFFICE VISIT (OUTPATIENT)
Dept: MATERNAL FETAL MEDICINE | Facility: CLINIC | Age: 29
End: 2019-10-03
Attending: OBSTETRICS & GYNECOLOGY
Payer: COMMERCIAL

## 2019-10-03 ENCOUNTER — HOSPITAL ENCOUNTER (OUTPATIENT)
Dept: ULTRASOUND IMAGING | Facility: CLINIC | Age: 29
Discharge: HOME | End: 2019-10-03
Attending: OBSTETRICS & GYNECOLOGY | Admitting: OBSTETRICS & GYNECOLOGY
Payer: COMMERCIAL

## 2019-10-03 DIAGNOSIS — O09.292 H/O INTRAUTERINE GROWTH RESTRICTION IN PRIOR PREGNANCY, CURRENTLY PREGNANT, SECOND TRIMESTER: ICD-10-CM

## 2019-10-03 DIAGNOSIS — O09.299 HISTORY OF PRE-ECLAMPSIA IN PRIOR PREGNANCY, CURRENTLY PREGNANT: Primary | ICD-10-CM

## 2019-10-03 DIAGNOSIS — O09.299 HISTORY OF PRE-ECLAMPSIA IN PRIOR PREGNANCY, CURRENTLY PREGNANT: ICD-10-CM

## 2019-10-03 PROCEDURE — 76816 OB US FOLLOW-UP PER FETUS: CPT

## 2019-10-03 NOTE — PROGRESS NOTES
"Please see \"Imaging\" tab under \"Chart Review\" for details of today's visit.    Abelardo Urias    "

## 2019-10-14 ENCOUNTER — OFFICE VISIT (OUTPATIENT)
Dept: OBGYN | Facility: CLINIC | Age: 29
End: 2019-10-14
Attending: ADVANCED PRACTICE MIDWIFE
Payer: COMMERCIAL

## 2019-10-14 VITALS
WEIGHT: 175.2 LBS | BODY MASS INDEX: 29.19 KG/M2 | HEART RATE: 79 BPM | HEIGHT: 65 IN | DIASTOLIC BLOOD PRESSURE: 75 MMHG | SYSTOLIC BLOOD PRESSURE: 115 MMHG

## 2019-10-14 DIAGNOSIS — Z87.59 HISTORY OF SEVERE PRE-ECLAMPSIA: ICD-10-CM

## 2019-10-14 DIAGNOSIS — O09.90 HIGH-RISK PREGNANCY, UNSPECIFIED TRIMESTER: Primary | ICD-10-CM

## 2019-10-14 DIAGNOSIS — Z98.891 HISTORY OF CESAREAN DELIVERY: ICD-10-CM

## 2019-10-14 LAB
DEPRECATED CALCIDIOL+CALCIFEROL SERPL-MC: 23 UG/L (ref 20–75)
ERYTHROCYTE [DISTWIDTH] IN BLOOD BY AUTOMATED COUNT: 12.2 % (ref 10–15)
GLUCOSE 1H P 50 G GLC PO SERPL-MCNC: 82 MG/DL (ref 60–129)
HCT VFR BLD AUTO: 37.6 % (ref 35–47)
HGB BLD-MCNC: 12.3 G/DL (ref 11.7–15.7)
MCH RBC QN AUTO: 30.5 PG (ref 26.5–33)
MCHC RBC AUTO-ENTMCNC: 32.7 G/DL (ref 31.5–36.5)
MCV RBC AUTO: 93 FL (ref 78–100)
PLATELET # BLD AUTO: 216 10E9/L (ref 150–450)
RBC # BLD AUTO: 4.03 10E12/L (ref 3.8–5.2)
T PALLIDUM AB SER QL: NONREACTIVE
WBC # BLD AUTO: 6.4 10E9/L (ref 4–11)

## 2019-10-14 PROCEDURE — 86780 TREPONEMA PALLIDUM: CPT | Performed by: ADVANCED PRACTICE MIDWIFE

## 2019-10-14 PROCEDURE — 82306 VITAMIN D 25 HYDROXY: CPT | Performed by: ADVANCED PRACTICE MIDWIFE

## 2019-10-14 PROCEDURE — 36415 COLL VENOUS BLD VENIPUNCTURE: CPT | Performed by: ADVANCED PRACTICE MIDWIFE

## 2019-10-14 PROCEDURE — G0463 HOSPITAL OUTPT CLINIC VISIT: HCPCS | Mod: ZF

## 2019-10-14 PROCEDURE — 85027 COMPLETE CBC AUTOMATED: CPT | Performed by: ADVANCED PRACTICE MIDWIFE

## 2019-10-14 PROCEDURE — 82950 GLUCOSE TEST: CPT | Performed by: ADVANCED PRACTICE MIDWIFE

## 2019-10-14 ASSESSMENT — MIFFLIN-ST. JEOR: SCORE: 1515.58

## 2019-10-14 NOTE — PROGRESS NOTES
28 year old, , 26w6d, presents for EOB visit.    Patient concerns: Feeling well overall- much better than first trimester. Having a boy!   Wondering about timing of c/s- d/t work/winter break. Desires repeat c/s. Thinks she wants a tubal ligation- interested in meeting with MD.  Had MFM ultrasound on 10/3- recommendation for q4 week growth ultrasound.    Declines glucose drink today. Wants to complete using apple juice as an alternative.     Denies cramping/contractions, vaginal bleeding, discharge or leakage of fluid. Reports +fetal movement.  No HA, vision changes, ruq/epigastric pain.      Education completed today includes breast feeding, [facility] hand out , contraception, counting movements, signs of pre-term labor, when to present to birthplace, post partum depression, GBS, getting enough iron and labor induction.  Birth preferences reviewed: Desires repeat c/s with tubal ligation  - reviewed c/s process  Labor support:   - Red   [place] Feeding plans : Breastfeeding - just bought a new pump    Contraception planned:  Desires PP tubal ligation if term - will have consent with MD  The following labs were ordered today:     GCT, CBC w platelets, Vitamin d, Anti-treponema   Water birth consent form was not given- not a candidate.    Blood type:   ABO   Date Value Ref Range Status   2019 A  Final     RH(D)   Date Value Ref Range Status   2019 Pos  Final     Antibody Screen   Date Value Ref Range Status   2019 Neg  Final   Rhogam  was not given.  TDAP  was not given- pt is <27 weeks.     A/P:  Encounter Diagnoses   Name Primary?     High-risk pregnancy, unspecified trimester Yes     History of severe pre-eclampsia      History of  delivery      Orders Placed This Encounter   Procedures     US OB >14 Weeks Follow Up     Glucose 1 Hour     CBC with Platelets     Vitamin D Deficiency     Treponema Abs w Reflex to RPR and Titer     EOB education reviewed.  EOB labs ordered.  Reviewed best screening tool to r/o gestational diabetes is GCT with glucose drink. Pt declines. Test performed with apple juice as an alternatice.   Cbc with plts, anti-trep, vitamin D ordered.   Reviewed timing of  section for 39 weeks unless medically indicated earlier.  Reviewed spinal, support person after prep, clear drape, skin to skin, taps block, length of stay, postpartum recovery.    Will plan to schedule at a later visit- would like to meet with MD performing c/s for a prenatal appt prior.   Desires MD consult for tubal ligation- plan to schedule for next visit.  Reviewed [facility] recommendation for q4 week growth ultrasounds. Standing order placed. Plan growth ultrasound with next appointment in 2 weeks.   Plan tdap at next visit as pt is <27 weeks today.  Continue aspirin.     Continue scheduled prenatal care, RTC in 2 weeks for growth ultrasound and HERB and prn if questions or concerns.      HERNAN Hdz, CNM

## 2019-10-14 NOTE — LETTER
10/14/2019       RE: Ameena Cardoza  [address] [facility] [address]     Dear Colleague,    Thank you for referring your patient, Ameena Cardoza, to the [facility] at [facility]. Please see a copy of my visit note below.     28 year old, , 26w6d, presents for EOB visit.    Patient concerns: Feeling well overall- much better than first trimester. Having a boy!   Wondering about timing of c/s- d/t work/winter break. Desires repeat c/s. Thinks she wants a tubal ligation- interested in meeting with MD.  Had MFM ultrasound on 10/3- recommendation for q4 week growth ultrasound.    Declines glucose drink today. Wants to complete using apple juice as an alternative.     Denies cramping/contractions, vaginal bleeding, discharge or leakage of fluid. Reports +fetal movement.  No HA, vision changes, ruq/epigastric pain.      Education completed today includes breast feeding, [facility] hand out , contraception, counting movements, signs of pre-term labor, when to present to birthplace, post partum depression, GBS, getting enough iron and labor induction.  Birth preferences reviewed: Desires repeat c/s with tubal ligation  - reviewed c/s process  Labor support:   - Red    Feeding plans : Breastfeeding - just bought a new pump    Contraception planned:  Desires PP tubal ligation if term - will have consent with MD  The following labs were ordered today:     GCT, CBC w platelets, Vitamin d, Anti-treponema   Water birth consent form was not given- not a candidate.    Blood type:   ABO   Date Value Ref Range Status   2019 A  Final     RH(D)   Date Value Ref Range Status   2019 Pos  Final     Antibody Screen   Date Value Ref Range Status   2019 Neg  Final   Rhogam  was not given.  TDAP  was not given- pt is <27 weeks.     A/P:  Encounter Diagnoses   Name Primary?     High-risk pregnancy, unspecified trimester Yes     History of  severe pre-eclampsia      History of  delivery      Orders Placed This Encounter   Procedures     US OB >14 Weeks Follow Up     Glucose 1 Hour     CBC with Platelets     Vitamin D Deficiency     Treponema Abs w Reflex to RPR and Titer     EOB education reviewed.  EOB labs ordered. Reviewed best screening tool to r/o gestational diabetes is GCT with glucose drink. Pt declines. Test performed with apple juice as an alternatice.   Cbc with plts, anti-trep, vitamin D ordered.   Reviewed timing of  section for 39 weeks unless medically indicated earlier.  Reviewed spinal, support person after prep, clear drape, skin to skin, taps block, length of stay, postpartum recovery.    Will plan to schedule at a later visit- would like to meet with MD performing c/s for a prenatal appt prior.   Desires MD consult for tubal ligation- plan to schedule for next visit.  Reviewed [facility] recommendation for q4 week growth ultrasounds. Standing order placed. Plan growth ultrasound with next appointment in 2 weeks.   Plan tdap at next visit as pt is <27 weeks today.  Continue aspirin.     Continue scheduled prenatal care, RTC in 2 weeks for growth ultrasound and HERB and prn if questions or concerns.      HERNAN Hdz CNM     Again, thank you for allowing me to participate in the care of your patient.      Sincerely,    Tomasa Ramirez CNM

## 2019-10-15 PROBLEM — E55.9 VITAMIN D DEFICIENCY: Status: ACTIVE | Noted: 2019-10-15

## 2019-10-29 ENCOUNTER — OFFICE VISIT (OUTPATIENT)
Dept: OBGYN | Facility: CLINIC | Age: 29
End: 2019-10-29
Attending: OBSTETRICS & GYNECOLOGY
Payer: COMMERCIAL

## 2019-10-29 ENCOUNTER — ANCILLARY PROCEDURE (OUTPATIENT)
Dept: ULTRASOUND IMAGING | Facility: CLINIC | Age: 29
End: 2019-10-29
Attending: ADVANCED PRACTICE MIDWIFE
Payer: COMMERCIAL

## 2019-10-29 VITALS
HEIGHT: 65 IN | DIASTOLIC BLOOD PRESSURE: 70 MMHG | SYSTOLIC BLOOD PRESSURE: 102 MMHG | HEART RATE: 84 BPM | BODY MASS INDEX: 29.36 KG/M2 | WEIGHT: 176.2 LBS

## 2019-10-29 DIAGNOSIS — Z23 NEED FOR TDAP VACCINATION: ICD-10-CM

## 2019-10-29 DIAGNOSIS — O09.93 HIGH-RISK PREGNANCY IN THIRD TRIMESTER: Primary | ICD-10-CM

## 2019-10-29 DIAGNOSIS — O09.90 HIGH-RISK PREGNANCY, UNSPECIFIED TRIMESTER: ICD-10-CM

## 2019-10-29 PROCEDURE — 25000128 H RX IP 250 OP 636: Mod: ZF

## 2019-10-29 PROCEDURE — 76816 OB US FOLLOW-UP PER FETUS: CPT

## 2019-10-29 PROCEDURE — 90715 TDAP VACCINE 7 YRS/> IM: CPT | Mod: ZF

## 2019-10-29 PROCEDURE — G0463 HOSPITAL OUTPT CLINIC VISIT: HCPCS | Mod: ZF

## 2019-10-29 PROCEDURE — 90471 IMMUNIZATION ADMIN: CPT | Mod: ZF

## 2019-10-29 ASSESSMENT — MIFFLIN-ST. JEOR: SCORE: 1520.12

## 2019-10-29 NOTE — PROGRESS NOTES
SUBJECTIVE   Ameena Cardoza is a 28 year old  who is 29w0d by a 5w6d US who presents for HERB visit.    Last pregnancy complicated by pre-eclampsia w severe features at 30 weeks with . Per chart review,  due to rapidly evolving HELLP syndrome, remote from delivery.   On aspirin therapy started at 12-14 weeks, has been taking regularly. Blood pressures have been within normal limits, today is 102/70. Has a BP cuff at home which she checks almost daily, no abnormal values.     No vaginal bleeding, no loss of fluids. Fetal movement is good. She has been having cramping pain for the past 3-4 days in her left upper quadrant/left rib area and lower right quadrant. Pain in LUQ is worse after eating. Typically lasts about 1-1.5 hours. She had an episode of diarrhea last night and this morning. Loose stool, not watery, no blood.    No headaches, chest pain, lower extremity edema, nausea.    Wants bilateral salpingectomy with term  delivery assuming she delivers full term. Will consider if pre-term.     Previous pregnancy complicated by:  -history of pre-e with severe features  -HELLP syndrome  - x1  -IUGR in prior pregnancy    OBJECTIVE   LMP 2019 (Approximate)     See Ob Flowsheet    ASSESSMENT & PLAN   Ameena Cardoza is a 28 year old  who is 29w0d by a 5w6d US who presents for HERB visit.    1. PNC: routine ob labs reviewed   - Rh pos, Rubella immune   - Tdap today   - S/P influenza immunization (2019)    2. Genetic screening/ diagnosis:    - Declined 1st trimester screening   - Normal level 2 US    3. History of pre-eclampsia/IUGR:   - On low dose aspirin   - BP has been within normal limits    - Has BP cuff at home, advised to check every day and call clinic if any abnormal values   - Baseline HELLP labs normal    - Q2wk visits now, Q1wk at 34wks   - Growth US today wnl    4. Diarrhea and cramping   - Cramping likely related to non-bloody diarrhea.  Not concerning right now with no fever, chills, or central/bilateral cramping concerning for contractions.   - Continue to monitor. If continues for 48 hrs, advised to call us.    5. Prior  section   - Desires repeat, orders placed today.    - Plans for bilateral salpingectomy, FTP signed today. If , may reconsider.     RTC 2 weeks, f/u US in 4 weeks     I acted as a scribe for Dr. Giselle Cueva  [facility], [facility]  10/29/2019    I, Dr. Desai, personally performed the services described in this documentation, as scribed by Ade Cueva in my presence, and it is both accurate and complete.   Alba Desai MD

## 2019-10-29 NOTE — NURSING NOTE
Chief Complaint   Patient presents with     Prenatal Care     29w0d       See DIANA De Jesus 10/29/2019

## 2019-10-29 NOTE — LETTER
10/29/2019       RE: Ameena Cardoza  [address] [facility] [address]     Dear Colleague,    Thank you for referring your patient, Ameena Cardoza, to the [facility] at [facility]. Please see a copy of my visit note below.      SUBJECTIVE   Ameena Cardoza is a 28 year old  who is 29w0d by a 5w6d US who presents for HERB visit.    Last pregnancy complicated by pre-eclampsia w severe features at 30 weeks with . Per chart review,  due to rapidly evolving HELLP syndrome, remote from delivery.   On aspirin therapy started at 12-14 weeks, has been taking regularly. Blood pressures have been within normal limits, today is 102/70. Has a BP cuff at home which she checks almost daily, no abnormal values.     No vaginal bleeding, no loss of fluids. Fetal movement is good. She has been having cramping pain for the past 3-4 days in her left upper quadrant/left rib area and lower right quadrant. Pain in LUQ is worse after eating. Typically lasts about 1-1.5 hours. She had an episode of diarrhea last night and this morning. Loose stool, not watery, no blood.    No headaches, chest pain, lower extremity edema, nausea.    Wants bilateral salpingectomy with term  delivery assuming she delivers full term. Will consider if pre-term.     Previous pregnancy complicated by:  -history of pre-e with severe features  -HELLP syndrome  - x1  -IUGR in prior pregnancy    OBJECTIVE   LMP 2019 (Approximate)     See Ob Flowsheet    ASSESSMENT & PLAN   Ameena Cardoza is a 28 year old  who is 29w0d by a 5w6d US who presents for HERB visit.    1. PNC: routine ob labs reviewed   - Rh pos, Rubella immune   - Tdap today   - S/P influenza immunization (2019)    2. Genetic screening/ diagnosis:    - Declined 1st trimester screening   - Normal level 2 US    3. History of pre-eclampsia/IUGR:   - On low dose aspirin   -  BP has been within normal limits    - Has BP cuff at home, advised to check every day and call clinic if any abnormal values   - Baseline HELLP labs normal    - Q2wk visits now, Q1wk at 34wks   - Growth US today wnl    4. Diarrhea and cramping   - Cramping likely related to non-bloody diarrhea. Not concerning right now with no fever, chills, or central/bilateral cramping concerning for contractions.   - Continue to monitor. If continues for 48 hrs, advised to call us.    5. Prior  section   - Desires repeat, orders placed today.    - Plans for bilateral salpingectomy, FTP signed today. If , may reconsider.     RTC 2 weeks, f/u US in 4 weeks     I acted as a scribe for Dr. Giselle Cueva  [facility], [facility] Clinic  10/29/2019    I, Dr. Desai, personally performed the services described in this documentation, as scribed by Ade Cueva in my presence, and it is both accurate and complete.     Alba Desai MD

## 2019-10-30 ENCOUNTER — PREP FOR PROCEDURE (OUTPATIENT)
Dept: OBGYN | Facility: CLINIC | Age: 29
End: 2019-10-30

## 2019-10-30 ENCOUNTER — TELEPHONE (OUTPATIENT)
Dept: OBGYN | Facility: CLINIC | Age: 29
End: 2019-10-30

## 2019-10-30 DIAGNOSIS — O34.219 PREVIOUS CESAREAN DELIVERY, ANTEPARTUM CONDITION OR COMPLICATION: Primary | ICD-10-CM

## 2019-10-30 RX ORDER — CEFAZOLIN SODIUM 1 G/3ML
1 INJECTION, POWDER, FOR SOLUTION INTRAMUSCULAR; INTRAVENOUS SEE ADMIN INSTRUCTIONS
Status: CANCELLED | OUTPATIENT
Start: 2019-10-30

## 2019-10-30 RX ORDER — CITRIC ACID/SODIUM CITRATE 334-500MG
30 SOLUTION, ORAL ORAL
Status: CANCELLED | OUTPATIENT
Start: 2019-10-30

## 2019-10-30 RX ORDER — CEFAZOLIN SODIUM 2 G/100ML
2 INJECTION, SOLUTION INTRAVENOUS
Status: CANCELLED | OUTPATIENT
Start: 2019-10-30

## 2019-10-30 RX ORDER — SODIUM CHLORIDE, SODIUM LACTATE, POTASSIUM CHLORIDE, CALCIUM CHLORIDE 600; 310; 30; 20 MG/100ML; MG/100ML; MG/100ML; MG/100ML
INJECTION, SOLUTION INTRAVENOUS CONTINUOUS
Status: CANCELLED | OUTPATIENT
Start: 2019-10-30

## 2019-10-30 RX ORDER — LIDOCAINE 40 MG/G
CREAM TOPICAL
Status: CANCELLED | OUTPATIENT
Start: 2019-10-30

## 2019-10-30 NOTE — TELEPHONE ENCOUNTER
Confirmed c/section date, time and location 1/7/20 with arrival time at 7:00a.m with nothing to eat eight hours before scheduled c/section time and clear liquids up to two hours before scheduled c/section time, informed patient that a letter will be mailed out.     to complete the following fields:            CHECKLIST     Google Calendar : Yes     Resident notified:Not Applicable     Clinic schedule blocked:  Not Applicable    Patient notified:Yes      Pre op information sent: Yes     Given to patient over the phone.Yes    Comments:

## 2019-11-14 ENCOUNTER — OFFICE VISIT (OUTPATIENT)
Dept: OBGYN | Facility: CLINIC | Age: 29
End: 2019-11-14
Attending: OBSTETRICS & GYNECOLOGY
Payer: COMMERCIAL

## 2019-11-14 VITALS
WEIGHT: 178.8 LBS | HEART RATE: 69 BPM | HEIGHT: 65 IN | SYSTOLIC BLOOD PRESSURE: 111 MMHG | DIASTOLIC BLOOD PRESSURE: 69 MMHG | BODY MASS INDEX: 29.79 KG/M2

## 2019-11-14 DIAGNOSIS — Z87.59 HISTORY OF SEVERE PRE-ECLAMPSIA: ICD-10-CM

## 2019-11-14 DIAGNOSIS — Z98.891 HISTORY OF CESAREAN DELIVERY: ICD-10-CM

## 2019-11-14 DIAGNOSIS — O09.893 SUPERVISION OF OTHER HIGH RISK PREGNANCIES, THIRD TRIMESTER: Primary | ICD-10-CM

## 2019-11-14 PROCEDURE — G0463 HOSPITAL OUTPT CLINIC VISIT: HCPCS | Mod: ZF

## 2019-11-14 ASSESSMENT — MIFFLIN-ST. JEOR: SCORE: 1526.91

## 2019-11-14 NOTE — PROGRESS NOTES
S:  Doing well, good FM, no contractions.  Blood pressures continue to be normal at home.  Her repeat c/s and BTL is scheduled.  Growth ultrasound on  as planned.     O:  See flow    A:  28 y/o  at 31+2 weeks, doing well.  Pregnancy complicated by h/o pre-eclampsia with severe features/HELLP syndrome requiring c/s at 30+6    P:  Continue daily ASA, blood pressure daily at home. RTC 2 weeks    Leena Fernandez MD, FACOG

## 2019-11-14 NOTE — LETTER
2019       RE: Ameena Cardoza  [address] [facility] [address]     Dear Colleague,    Thank you for referring your patient, Ameena Cardoza, to the [facility] at [facility]. Please see a copy of my visit note below.    S:  Doing well, good FM, no contractions.  Blood pressures continue to be normal at home.  Her repeat c/s and BTL is scheduled.  Growth ultrasound on  as planned.     O:  See flow    A:  30 y/o  at 31+2 weeks, doing well.  Pregnancy complicated by h/o pre-eclampsia with severe features/HELLP syndrome requiring c/s at 30+6    P:  Continue daily ASA, blood pressure daily at home. RTC 2 weeks    Leena Fernandez MD, FACOG

## 2019-11-26 ENCOUNTER — ANCILLARY PROCEDURE (OUTPATIENT)
Dept: ULTRASOUND IMAGING | Facility: CLINIC | Age: 29
End: 2019-11-26
Attending: OBSTETRICS & GYNECOLOGY
Payer: COMMERCIAL

## 2019-11-26 ENCOUNTER — OFFICE VISIT (OUTPATIENT)
Dept: OBGYN | Facility: CLINIC | Age: 29
End: 2019-11-26
Attending: ADVANCED PRACTICE MIDWIFE
Payer: COMMERCIAL

## 2019-11-26 VITALS
HEIGHT: 65 IN | WEIGHT: 180.4 LBS | SYSTOLIC BLOOD PRESSURE: 113 MMHG | BODY MASS INDEX: 30.06 KG/M2 | DIASTOLIC BLOOD PRESSURE: 74 MMHG | HEART RATE: 83 BPM

## 2019-11-26 DIAGNOSIS — O34.219 PREVIOUS CESAREAN DELIVERY, ANTEPARTUM CONDITION OR COMPLICATION: ICD-10-CM

## 2019-11-26 DIAGNOSIS — O09.93 HIGH-RISK PREGNANCY IN THIRD TRIMESTER: ICD-10-CM

## 2019-11-26 DIAGNOSIS — O09.90 HIGH-RISK PREGNANCY, UNSPECIFIED TRIMESTER: Primary | ICD-10-CM

## 2019-11-26 DIAGNOSIS — O40.3XX0 POLYHYDRAMNIOS IN THIRD TRIMESTER COMPLICATION, SINGLE OR UNSPECIFIED FETUS: ICD-10-CM

## 2019-11-26 DIAGNOSIS — Z98.891 HISTORY OF CESAREAN DELIVERY: ICD-10-CM

## 2019-11-26 PROBLEM — O40.9XX0 POLYHYDRAMNIOS: Status: ACTIVE | Noted: 2019-11-26

## 2019-11-26 PROCEDURE — 76805 OB US >/= 14 WKS SNGL FETUS: CPT

## 2019-11-26 PROCEDURE — G0463 HOSPITAL OUTPT CLINIC VISIT: HCPCS | Mod: 25,ZF

## 2019-11-26 ASSESSMENT — MIFFLIN-ST. JEOR: SCORE: 1534.17

## 2019-11-26 NOTE — LETTER
"2019       RE: Ameena Cardoza  [address] [facility] [address]     Dear Colleague,    Thank you for referring your patient, Ameena Cardoza, to the [facility] at [facility]. Please see a copy of my visit note below.    Subjective:      29 year old  at 33w0d presentst for a routine prenatal appointment.     Denies cramping/contractions, vaginal bleeding, discharge or leakage of fluid. Reports +fetal movement.  No HA, vision changes, ruq/epigastric pain.      Patient concerns: Feeling well overall. Had growth ultrasound today- GARO 57%tile= 2269gms. New finding of polyhydramnios with DANIEL of 24.9.   Pt continues to take daily aspirin. Had appt with MD- repeat  section with BTL scheduled for 39 weeks on 2020 @ 7AM. Tubal papers were signed.     Objective:  Vitals:    19 0837   BP: 113/74   BP Location: Left arm   Patient Position: Chair   Pulse: 83   Weight: 81.8 kg (180 lb 6.4 oz)   Height: 1.651 m (5' 5\")   See ob flowsheet    Ultrasound:  EFW= 2269gm, 57%  DANIEL= 24.9, Polyhydramnios  Cephalic    Assessment/Plan  Encounter Diagnoses   Name Primary?     High-risk pregnancy, unspecified trimester Yes     Polyhydramnios in third trimester complication, single or unspecified fetus      Previous  delivery, antepartum condition or complication      History of  delivery      Orders Placed This Encounter   Procedures     US OB Fetal Biophys Prf wo NonStrs Singls Sgl     - Reviewed total weight gain, encouraged continued healthy diet and exercise.  .  Reviewed importance of daily fetal kick count and why/how to contact provider.    - Reviewed why/how to contact provider if headache/visual changes/RUQ or epigastric pain, decreased fetal movement, vaginal bleeding, leakage of fluid or more than 4 contractions in an hour.     Patient education/orders or handouts today:  PTL signs/symptoms    - Reviewed ultrasound " and new finding of polyhydramnios. Discussed associated risks and s/s to present to BP.  Discussed  surveillance and delivery timing recommendations d/t polyhydramnios.  - Will begin weekly BPPs.   -  section with BTL scheduled at 39 weeks on 2020.   - Orders for weekly BPP placed.  - Pt has appointment with Dr. Phillips on  to meet her before surgery.     Continue scheduled prenatal care, RTC for weekly BPPs, HERB in 2 weeks and on  and prn if questions or concerns.      Tomasa Ramirez, HERNAN, CNM

## 2019-11-26 NOTE — PROGRESS NOTES
"Subjective:      29 year old  at 33w0d presentst for a routine prenatal appointment.     Denies cramping/contractions, vaginal bleeding, discharge or leakage of fluid. Reports +fetal movement.  No HA, vision changes, ruq/epigastric pain.      Patient concerns: Feeling well overall. Had growth ultrasound today- GARO 57%tile= 2269gms. New finding of polyhydramnios with DANIEL of 24.9.   Pt continues to take daily aspirin. Had appt with MD- repeat  section with BTL scheduled for 39 weeks on 2020 @ 7AM. Tubal papers were signed.     Objective:  Vitals:    19 0837   BP: 113/74   BP Location: Left arm   Patient Position: Chair   Pulse: 83   Weight: 81.8 kg (180 lb 6.4 oz)   Height: 1.651 m (5' 5\")     See ob flowsheet    Ultrasound:  EFW= 2269gm, 57%  DANIEL= 24.9, Polyhydramnios  Cephalic    Assessment/Plan     Encounter Diagnoses   Name Primary?     High-risk pregnancy, unspecified trimester Yes     Polyhydramnios in third trimester complication, single or unspecified fetus      Previous  delivery, antepartum condition or complication      History of  delivery      Orders Placed This Encounter   Procedures     US OB Fetal Biophys Prf wo NonStrs Singls Sgl     - Reviewed total weight gain, encouraged continued healthy diet and exercise.  .  Reviewed importance of daily fetal kick count and why/how to contact provider.    - Reviewed why/how to contact provider if headache/visual changes/RUQ or epigastric pain, decreased fetal movement, vaginal bleeding, leakage of fluid or more than 4 contractions in an hour.     Patient education/orders or handouts today:  PTL signs/symptoms    - Reviewed ultrasound and new finding of polyhydramnios. Discussed associated risks and s/s to present to BP.  Discussed  surveillance and delivery timing recommendations d/t polyhydramnios.  - Will begin weekly BPPs.   -  section with BTL scheduled at 39 weeks on 2020.   - Orders for weekly " BPP placed.  - Pt has appointment with Dr. Phillips on 12/17 to meet her before surgery.     Continue scheduled prenatal care, RTC for weekly BPPs, HERB in 2 weeks and on 12/17 and prn if questions or concerns.      HERNAN Bermudez, CNM

## 2019-12-02 ENCOUNTER — TELEPHONE (OUTPATIENT)
Dept: OBGYN | Facility: CLINIC | Age: 29
End: 2019-12-02

## 2019-12-03 ENCOUNTER — ANCILLARY PROCEDURE (OUTPATIENT)
Dept: ULTRASOUND IMAGING | Facility: CLINIC | Age: 29
End: 2019-12-03
Attending: ADVANCED PRACTICE MIDWIFE
Payer: COMMERCIAL

## 2019-12-03 DIAGNOSIS — O09.90 HIGH-RISK PREGNANCY, UNSPECIFIED TRIMESTER: ICD-10-CM

## 2019-12-03 PROCEDURE — 76819 FETAL BIOPHYS PROFIL W/O NST: CPT

## 2019-12-12 ENCOUNTER — TELEPHONE (OUTPATIENT)
Dept: OBGYN | Facility: CLINIC | Age: 29
End: 2019-12-12

## 2019-12-12 ENCOUNTER — ANCILLARY PROCEDURE (OUTPATIENT)
Dept: ULTRASOUND IMAGING | Facility: CLINIC | Age: 29
End: 2019-12-12
Attending: ADVANCED PRACTICE MIDWIFE
Payer: COMMERCIAL

## 2019-12-12 PROCEDURE — 76819 FETAL BIOPHYS PROFIL W/O NST: CPT

## 2019-12-15 ENCOUNTER — HOSPITAL ENCOUNTER (OUTPATIENT)
Facility: CLINIC | Age: 29
Setting detail: OBSERVATION
Discharge: HOME | End: 2019-12-15
Attending: ADVANCED PRACTICE MIDWIFE | Admitting: ADVANCED PRACTICE MIDWIFE
Payer: COMMERCIAL

## 2019-12-15 VITALS — TEMPERATURE: 97.8 F | SYSTOLIC BLOOD PRESSURE: 130 MMHG | DIASTOLIC BLOOD PRESSURE: 75 MMHG | HEART RATE: 85 BPM

## 2019-12-15 PROCEDURE — G0378 HOSPITAL OBSERVATION PER HR: HCPCS

## 2019-12-15 PROCEDURE — 59025 FETAL NON-STRESS TEST: CPT

## 2019-12-15 PROCEDURE — G0463 HOSPITAL OUTPT CLINIC VISIT: HCPCS | Mod: 25

## 2019-12-15 RX ORDER — ONDANSETRON 2 MG/ML
4 INJECTION INTRAMUSCULAR; INTRAVENOUS EVERY 6 HOURS PRN
Status: DISCONTINUED | OUTPATIENT
Start: 2019-12-15 | End: 2019-12-15 | Stop reason: HOSPADM

## 2019-12-15 NOTE — PLAN OF CARE
Data: Patient [facility] at 1130.   Reason for maternal/fetal assessment per patient is Decreased Fetal Movement (post fall)  . Patient is a . Prenatal record reviewed.      OB History    Para Term  AB Living   2 1 0 1 0 1   SAB TAB Ectopic Multiple Live Births   0 0 0 0 1      # Outcome Date GA Lbr Jose Miguel/2nd Weight Sex Delivery Anes PTL Lv   2 Current            1  16 30w6d  1.162 kg (2 lb 9 oz) F CS-LTranv Spinal Y CHARIS      Birth Comments: PreEsevere,[place]      Complications: Preeclampsia/Hypertension, HELLP (hemolytic anemia/elev liver enzymes/low platelets in pregnancy)      Name: Yolanda       Obstetric Comments   PreE dx 30 wk, inpatient, BMZ, IOL, mag sulfate, C/S,7 weeks in NICU       Medical History:   Past Medical History:   Diagnosis Date    Family history of breast cancer gene mutation in first degree relative     gene tested CHek 2 associated with increased risk  q6 mos mammo starting at 33     HELLP (hemolytic anemia/elev liver enzymes/low platelets in pregnancy)     PCOS (polycystic ovarian syndrome)     age 12 no treatment necessary     Preeclampsia, severe, third trimester     HTN, proteinuria, 30wks-->mag, IOL x 24 hrs->C/S   . Gestational Age 35w5d. VSS. Cervix: not examined.  Fetal movement present. Patient denies cramping, backache, vaginal discharge, pelvic pressure, UTI symptoms, GI problems, bloody show, vaginal bleeding, edema, headache, visual disturbances, epigastric or URQ pain, abdominal pain, rupture of membranes.     Action: Verbal consent for EFM. Triage assessment completed. EFM applied for NST. Uterine assessment reveals no contractions. Fetal assessment: Presumed adequate fetal oxygenation documented (see flow record). Patient education pamphlets given on fetal movement counts and signs of labor or reasons to call her provider. Patient instructed to report change in fetal movement, vaginal leaking of fluid or bleeding,  abdominal pain, or any concerns related to the pregnancy to her nurse/physician.   Response: Mora Hendricks CNM informed of arrival. Plan per provider is discharge to home. Patient verbalized understanding of education and verbalized agreement with plan. Discharged ambulatory at 1300.

## 2019-12-15 NOTE — DISCHARGE INSTRUCTIONS
Discharge Instruction for Undelivered Patients      You were seen for: Fetal Assessment  We Consulted: Mora Hendricks CNM  You had (Test or Medicine):NST     Diet:   You may eat meals and snacks.     Activity:  Count fetal kicks everyday (see handout)     Call your provider if you notice:  Swelling in your face or increased swelling in your hands or legs.  Headaches that are not relieved by Tylenol (acetaminophen).  Changes in your vision (blurring: seeing spots or stars.)  Nausea (sick to your stomach) and vomiting (throwing up).   Weight gain of 5 pounds or more per week.  Heartburn that doesn't go away.  Signs of bladder infection: pain when you urinate (use the toilet), need to go more often and more urgently.  The bag of simon (rupture of membranes) breaks, or you notice leaking in your underwear.  Bright red blood in your underwear.  Abdominal (lower belly) or stomach pain.  Second (plus) baby: Contractions (tightening) less than 10 minutes apart and getting stronger.  *Increase or change in vaginal discharge (note the color and amount)      Follow-up:  As scheduled in the clinic

## 2019-12-15 NOTE — PROGRESS NOTES
HOSPITAL TRIAGE NOTE  ===================    CHIEF COMPLAINT  ========================  Ameena Cardoza is a 29 year old patient presenting today at 35w5d for evaluation of decreased fetal movement.    Patient's last menstrual period was 2019 (approximate).  Estimated Date of Delivery: 2020       HPI  ==================   Marcia reports falling 19 at 2100. She was getting out of the car and slipped on the ice, fell to her knees and then forward onto right side of belly. She felt baby moving that night, but states yesterday and this morning there has been less movement than usual. Always felt some movement, but when counting 10 movements there were never 10 in an hour.   Prenatal record and labs reviewed from [facility], through "[employer] EMR.    CONTRACTIONS: none felt   ABDOMINAL PAIN:  Felt some soreness yesterday on right side   FETAL MOVEMENT: decreased since yesterday morning, 19    VAGINAL BLEEDING: none  RUPTURE OF MEMBRANES: no  PELVIC PAIN: none    PREGNANCY COMPLICATIONS: polyhydramnios and hx of C/S  OTHER: Hx of severe preE with previous pregnancy    # Pain Assessment:  Current Pain Score 12/15/2019   Patient currently in pain? denies   Ameena garcia pain level was assessed and she currently denies pain.        REVIEW OF SYSTEMS  =====================  C: NEGATIVE for fever, chills  I: NEGATIVE for worrisome rashes, moles or lesions  E: NEGATIVE for vision changes or irritation  R: NEGATIVE for significant cough or SOB  CV: NEGATIVE for chest pain, palpitations or varicosities  GI: NEGATIVE for nausea, abdominal pain, heartburn, or change in bowel habits  : NEGATIVE for frequency, dysuria, or hematuria  M: NEGATIVE for significant arthralgias or myalgia  N: NEGATIVE for headache, weakness, dizziness or paresthesias  P: NEGATIVE for changes in mood or affect    PROBLEM LIST  ===============  Patient Active Problem List    Diagnosis Date Noted     Labor and  delivery, indication for care 12/15/2019     Priority: Medium     Polyhydramnios- diagnosed , weekly BPP 2019     Priority: Medium     2019: DANIEL 24.9, weekly BPPs, c/s at 39       Previous  delivery, antepartum condition or complication 10/30/2019     Priority: Medium     Added automatically from request for surgery [id number]    Repeat c/s with PPTL scheduled 20 @ 0700       Vitamin D deficiency - mychart 10/15/2019     Priority: Medium     UTI in pregnancy 2019     Priority: Medium     19- UC- ,000 ecoli- treated with keflex  19- UC mixed carl       High-risk pregnancy, unspecified trimester 2019     Priority: Medium     , XOCHITL 20  - hx of ltcs after IOL for severe pre-e at 30 weeks  - desires repeat  section  - To start aspirin therapy at 12-14 weeks  Level 2 ultrasound scheduled for 19: voided before collection at [facility]  -  Needs to collect GC/CHlamydia  Just completed vag yeast tx)  And UC KEREN  Had 2 days left on antibiotic on   - UC neg    10/14/19- * Declined GCT drink- did apple juice- WNL*  on aspirin, needs q4week growth us  Desires repeat c/s with tubal    10/29/19: ultrasound EFW 61%tile, repeat growth us in 4 weeks       History of  delivery- repeat c/s with pptl 2019     Priority: Medium     Plans repeat C/S with tubal ligation    appt on 10/29 with md for tubal consent      Repeat c/s with PPTL scheduled 20 @ 0700       History of severe pre-eclampsia 2019     Priority: Medium     dx at 30wks, admitted, BMZ, started IOL (x24 hrs), then prim CS. Infant in NICU x 7 wks, doing well now at age 1   Start ASA at 12 wk  Pt interested in %chance for repeat PreE  19: will start aspirin at 12 weeks  REcommend serial growth US q 4wks     10/14/2019: taking aspirin  Growth q4, next on 10/29       Family history of breast cancer gene mutation in first degree relative      Priority: Medium      gene tested CHek 2 associated with increased risk  q6 mos mammo starting at 33        PCOS (polycystic ovarian syndrome)      Priority: Medium     age 12 no treatment necessary   Very irreg cycles  spon pregnancy x 2         HISTORIES  ==============  ALLERGIES:      Allergies   Allergen Reactions     Sulfa Drugs GI Disturbance     PAST MEDICAL HISTORY  Past Medical History:   Diagnosis Date     Family history of breast cancer gene mutation in first degree relative     gene tested CHek 2 associated with increased risk  q6 mos mammo starting at 33      HELLP (hemolytic anemia/elev liver enzymes/low platelets in pregnancy)      PCOS (polycystic ovarian syndrome)     age 12 no treatment necessary      Preeclampsia, severe, third trimester 2016    HTN, proteinuria, 30wks-->mag, IOL x 24 hrs->C/S     SOCIAL HISTORY  Social History     Socioeconomic History     Marital status:      Spouse name: Red     Number of children: 1     Years of education: Not on file     Highest education level: Not on file   Occupational History     Comment: advisor, undergrad, learning abroad   Social Needs     Financial resource strain: Not on file     Food insecurity:     Worry: Not on file     Inability: Not on file     Transportation needs:     Medical: Not on file     Non-medical: Not on file   Tobacco Use     Smoking status: Never Smoker     Smokeless tobacco: Never Used   Substance and Sexual Activity     Alcohol use: Not Currently     Drug use: Not Currently     Sexual activity: Yes     Partners: Male   Lifestyle     Physical activity:     Days per week: Not on file     Minutes per session: Not on file     Stress: Not on file   Relationships     Social connections:     Talks on phone: Not on file     Gets together: Not on file     Attends Sikh service: Not on file     Active member of club or organization: Not on file     Attends meetings of clubs or organizations: Not on file     Relationship status: Not on file      Intimate partner violence:     Fear of current or ex partner: Not on file     Emotionally abused: Not on file     Physically abused: Not on file     Forced sexual activity: Not on file   Other Topics Concern     Not on file   Social History Narrative     Not on file     PARTNER: involved, not present today   EMPLOYMENT: working full time, advisor   FAMILY HISTORY  Family History   Problem Relation Age of Onset     Breast Cancer Paternal Grandmother         age 40-x 2 bouts     Hypertension Father 45     Breast Cancer Maternal Aunt         40     Breast Cancer Maternal Aunt         40     Diabetes Paternal Grandfather      OB HISTORY  OB History    Para Term  AB Living   2 1 0 1 0 1   SAB TAB Ectopic Multiple Live Births   0 0 0 0 1      # Outcome Date GA Lbr Jose Miguel/2nd Weight Sex Delivery Anes PTL Lv   2 Current            1  16 30w6d  1.162 kg (2 lb 9 oz) F CS-LTranv Spinal Y CHARIS      Birth Comments: [place]severe,[place]      Complications: Preeclampsia/Hypertension, HELLP (hemolytic anemia/elev liver enzymes/low platelets in pregnancy)      Name: Yolanda       Obstetric Comments   PreE dx 30 wk, inpatient, BMZ, IOL, mag sulfate, C/S,7 weeks in NICU      Prenatal Labs:   Lab Results   Component Value Date    ABO A 2019    RH Pos 2019    AS Neg 2019    HEPBANG Nonreactive 2019    RUQIGG 9 2019    HGB 12.3 10/14/2019     Rubella- Non immune     ULTRASOUND(s) reviewed: Polyhydramnios, normal fetal anatomy, placenta anterior    EXAM  ============  Pulse 85   Temp 97.8  F (36.6  C) (Oral)   LMP 2019 (Approximate)   GENERAL APPEARANCE: healthy, alert and no distress  RESP: lungs clear to auscultation - no rales, rhonchi or wheezes  CV: regular rates and rhythm, normal S1 S2, no S3 or S4 and no murmur,and no varicosities  ABDOMEN:  soft, nontender, no epigastric pain  SKIN: no suspicious lesions or rashes  NEURO: Denies headache, blurred vision, other vision  changes  PSYCH: mentation appears normal. and affect normal/bright  MS/ LEGS: Reflexes normal bilaterally    CONTRACTIONS: 1 in 30 mins   FETAL HEART TONES: continuous EFM- baseline 130 with moderate variability and positive accelerations. No decelerations.  NST: REACTIVE  EFW: 5.5#    PELVIC EXAM: deferred  HUNT SCORE: N/A  PRESENTATION: cephalic by Le[place]. Ultrasound not done.    ROM: no  ROMPLUS: not done    LABS: none  Lab results reviewed- N/A  DIAGNOSIS  ============  35w5d seen on the [facility] Triage decreased fetal movement   NST: REACTIVE  Fetal Heart rate tracing:category one  Patient Active Problem List   Diagnosis     High-risk pregnancy, unspecified trimester     History of  delivery- repeat c/s with pptl 19     History of severe pre-eclampsia     Family history of breast cancer gene mutation in first degree relative     PCOS (polycystic ovarian syndrome)     UTI in pregnancy     Vitamin D deficiency - mychart     Previous  delivery, antepartum condition or complication     Polyhydramnios- diagnosed , weekly BPP     Labor and delivery, indication for care       PLAN  ============  Discharge to home with labor instuctions per discharge instruction form  Call or return to the [facility] with contractions, cramping, abdominal or pelvic pain, vaginal bleeding, leaking fluid or decreased fetal movement.  Encouraged patient to call STAT if any falls, trauma, decreased fetal movement   Follow up at your next clinic visit- 19 or HERNAN Miller CNM

## 2019-12-17 ENCOUNTER — OFFICE VISIT (OUTPATIENT)
Dept: OBGYN | Facility: CLINIC | Age: 29
End: 2019-12-17
Attending: OBSTETRICS & GYNECOLOGY
Payer: COMMERCIAL

## 2019-12-17 ENCOUNTER — ANCILLARY PROCEDURE (OUTPATIENT)
Dept: ULTRASOUND IMAGING | Facility: CLINIC | Age: 29
End: 2019-12-17
Attending: ADVANCED PRACTICE MIDWIFE
Payer: COMMERCIAL

## 2019-12-17 VITALS
DIASTOLIC BLOOD PRESSURE: 62 MMHG | SYSTOLIC BLOOD PRESSURE: 122 MMHG | WEIGHT: 184.7 LBS | BODY MASS INDEX: 30.77 KG/M2 | HEART RATE: 67 BPM | HEIGHT: 65 IN

## 2019-12-17 DIAGNOSIS — O09.90 HIGH-RISK PREGNANCY, UNSPECIFIED TRIMESTER: Primary | ICD-10-CM

## 2019-12-17 PROCEDURE — G0463 HOSPITAL OUTPT CLINIC VISIT: HCPCS | Mod: 25,ZF

## 2019-12-17 PROCEDURE — 76819 FETAL BIOPHYS PROFIL W/O NST: CPT

## 2019-12-17 ASSESSMENT — MIFFLIN-ST. JEOR: SCORE: 1553.67

## 2019-12-17 NOTE — LETTER
2019       RE: Ameena Cardoza  [address] [facility] [facility] [address]     Dear Colleague,    Thank you for referring your patient, Ameena Cardoza, to the [facility] at [facility]. Please see a copy of my visit note below.    30 yo  at 36+0 here for HERB.  Repeat c/s and bilateral salpingectomy scheduled with me on . Reviewed events of surgery and [facility]. She and her partner are excited to have full term pregnancy.    O:  See flow sheet    A/P IUP at 36+0 with previous c/s scheduled for repeat c bilateral salpingectomy.  # H/o pre-eclampsia with iatrogenic pre-term birth. On ASA.  No signs or symptoms today.  # polyhydramnios  29 cm today. BPP   RTC in 1 week.    Ana Phillips MD

## 2019-12-17 NOTE — PROGRESS NOTES
28 yo  at 36+0 here for HERB.  Repeat c/s and bilateral salpingectomy scheduled with me on . Reviewed events of surgery and baby [facility]. She and her partner are excited to have full term pregnancy.    O:  See flow sheet    A/P IUP at 36+0 with previous c/s scheduled for repeat c bilateral salpingectomy.  # H/o pre-eclampsia with iatrogenic pre-term birth. On ASA.  No signs or symptoms today.  # polyhydramnios  29 cm today. BPP   RTC in 1 week.    Ana Phillips MD

## 2019-12-26 NOTE — PROGRESS NOTES
"[facility]  Return OB Visit    S: Patient doing well today. Reports increased swelling in lower extremities. Notices it more in evenings. No headache, vision changes, CP, SOB, epigastric pain. Normal fetal movement. No contractions, LOF, VB     Her pregnancy is notable for:  -Polyhydramnios, resolved  -Pre-E with severe features/HELLP syndrome in prior pregnancy  -Hx CS x1 after undergoing IOL for the above  -IUGR in prior pregnancy  -UTI, s/p Keflex and negative KEREN  -Rubella equivocal       O: /83   Pulse 74   Ht 1.651 m (5' 5\")   Wt 83.9 kg (185 lb)   LMP 2019 (Approximate)   BMI 30.79 kg/m     Gen: Well-appearing, NAD  Extremities: Trace edema b/l    A/P:  Ameena Cardoza is a 29 year old  at 37w3d by 5w6d US here for return OB visit.    #PNC  - Prenatal labs reviewed. Rh pos, neisha negative, Hep B immune, Rubella equivocal (plan MMR postpartum)  - GCT passed (used apple juice, patient declined Glucola)  - Immunizations s/p Tdap and flu    #History of preeclampsia with SF/HELLP syndrome in prior pregnancy  -Blood pressure higher than prior today (134/83, 132/88) and increasing edema. Will repeat HELLP labs today. Okay to MyChart results if normal, call with abnormal. She will continue to check her BP daily at home. Reviewed BP thresholds and symptoms to call.  -Baseline HELLP labs wnl, UPC <0.05  -Continue ASA  -q4w growth ultrasounds, today 3323g, 65.1%ile     #History of  section x1  -Planning repeat, scheduled for  with BTL    #Polyhydramnios, resolved on US today  -MVP 7.71. Will continue weekly BPP    Staffed with Dr. Madhav Oviedo MD  Ob/Gyn PGY-2  2019 3:51 PM    RTC in 1 week, sooner if concerns    OBGYN Attending Addendum    mAeena Cardoza was seen by the resident, Dr. Oviedo, in [facility]. I, Ethel Corey, reviewed the history & exam. The assessment and plan were made jointly between myself and Dr. Oviedo.    Ethel " MD Madhav, MSCI    [facility]OB[facility]N

## 2019-12-27 ENCOUNTER — OFFICE VISIT (OUTPATIENT)
Dept: OBGYN | Facility: CLINIC | Age: 29
End: 2019-12-27
Attending: OBSTETRICS & GYNECOLOGY
Payer: COMMERCIAL

## 2019-12-27 ENCOUNTER — ANCILLARY PROCEDURE (OUTPATIENT)
Dept: ULTRASOUND IMAGING | Facility: CLINIC | Age: 29
End: 2019-12-27
Attending: ADVANCED PRACTICE MIDWIFE
Payer: COMMERCIAL

## 2019-12-27 VITALS
HEIGHT: 65 IN | WEIGHT: 185 LBS | HEART RATE: 74 BPM | DIASTOLIC BLOOD PRESSURE: 83 MMHG | SYSTOLIC BLOOD PRESSURE: 134 MMHG | BODY MASS INDEX: 30.82 KG/M2

## 2019-12-27 DIAGNOSIS — O09.90 HIGH-RISK PREGNANCY, UNSPECIFIED TRIMESTER: ICD-10-CM

## 2019-12-27 DIAGNOSIS — O09.90 HIGH-RISK PREGNANCY, UNSPECIFIED TRIMESTER: Primary | ICD-10-CM

## 2019-12-27 LAB
ALT SERPL W P-5'-P-CCNC: 17 U/L (ref 0–50)
AST SERPL W P-5'-P-CCNC: 14 U/L (ref 0–45)
CREAT SERPL-MCNC: 0.54 MG/DL (ref 0.52–1.04)
ERYTHROCYTE [DISTWIDTH] IN BLOOD BY AUTOMATED COUNT: 12.7 % (ref 10–15)
GFR SERPL CREATININE-BSD FRML MDRD: >90 ML/MIN/{1.73_M2}
HCT VFR BLD AUTO: 36.9 % (ref 35–47)
HGB BLD-MCNC: 11.7 G/DL (ref 11.7–15.7)
MCH RBC QN AUTO: 28.6 PG (ref 26.5–33)
MCHC RBC AUTO-ENTMCNC: 31.7 G/DL (ref 31.5–36.5)
MCV RBC AUTO: 90 FL (ref 78–100)
PLATELET # BLD AUTO: 216 10E9/L (ref 150–450)
RBC # BLD AUTO: 4.09 10E12/L (ref 3.8–5.2)
WBC # BLD AUTO: 6.8 10E9/L (ref 4–11)

## 2019-12-27 PROCEDURE — 84460 ALANINE AMINO (ALT) (SGPT): CPT | Performed by: OBSTETRICS & GYNECOLOGY

## 2019-12-27 PROCEDURE — 76819 FETAL BIOPHYS PROFIL W/O NST: CPT

## 2019-12-27 PROCEDURE — 85027 COMPLETE CBC AUTOMATED: CPT | Performed by: OBSTETRICS & GYNECOLOGY

## 2019-12-27 PROCEDURE — 36415 COLL VENOUS BLD VENIPUNCTURE: CPT | Performed by: OBSTETRICS & GYNECOLOGY

## 2019-12-27 PROCEDURE — 82565 ASSAY OF CREATININE: CPT | Performed by: OBSTETRICS & GYNECOLOGY

## 2019-12-27 PROCEDURE — G0463 HOSPITAL OUTPT CLINIC VISIT: HCPCS | Mod: ZF

## 2019-12-27 PROCEDURE — 84450 TRANSFERASE (AST) (SGOT): CPT | Performed by: OBSTETRICS & GYNECOLOGY

## 2019-12-27 ASSESSMENT — MIFFLIN-ST. JEOR: SCORE: 1555.03

## 2019-12-27 NOTE — LETTER
"2019       RE: Ameena Cardoza  [address]  [place] [address]     Dear Colleague,    Thank you for referring your patient, Ameena Cardoza, to the [facility] at [facility]. Please see a copy of my visit note below.    [facility]  Return OB Visit    S: Patient doing well today. Reports increased swelling in lower extremities. Notices it more in evenings. No headache, vision changes, CP, SOB, epigastric pain. Normal fetal movement. No contractions, LOF, VB     Her pregnancy is notable for:  -Polyhydramnios, resolved  -Pre-E with severe features/HELLP syndrome in prior pregnancy  -Hx CS x1 after undergoing IOL for the above  -IUGR in prior pregnancy  -UTI, s/p Keflex and negative KEREN  -Rubella equivocal       O: /83   Pulse 74   Ht 1.651 m (5' 5\")   Wt 83.9 kg (185 lb)   LMP 2019 (Approximate)   BMI 30.79 kg/m      Gen: Well-appearing, NAD  Extremities: Trace edema b/l    A/P:  Ameena Cardoza is a 29 year old  at 37w3d by 5w6d US here for return OB visit.    #PNC  - Prenatal labs reviewed. Rh pos, neisha negative, Hep B immune, Rubella equivocal (plan MMR postpartum)  - GCT passed (used apple juice, patient declined Glucola)  - Immunizations s/p Tdap and flu    #History of preeclampsia with SF/HELLP syndrome in prior pregnancy  -Blood pressure higher than prior today (134/83, 132/88) and increasing edema. Will repeat HELLP labs today. Okay to MyChart results if normal, call with abnormal. She will continue to check her BP daily at home. Reviewed BP thresholds and symptoms to call.  -Baseline HELLP labs wnl, UPC <0.05  -Continue ASA  -q4w growth ultrasounds, today 3323g, 65.1%ile     #History of  section x1  -Planning repeat, scheduled for  with BTL    #Polyhydramnios, resolved on US today  -MVP 7.71. Will continue weekly BPP    Staffed with Dr. Madhav Oviedo MD  Ob/Gyn PGY-2  December " 27, 2019 3:51 PM    RTC in 1 week, sooner if concerns

## 2020-01-01 ENCOUNTER — TELEPHONE (OUTPATIENT)
Dept: OBGYN | Facility: CLINIC | Age: 30
End: 2020-01-01

## 2020-01-01 NOTE — TELEPHONE ENCOUNTER
"Returned answering service call for \"head cold x1 week concerns\".  Pt states she developed sinus congestion and pain, slight cough 4-5 days ago.  Denies fever, took temp and was 98.3 most recently.  + sick contact (child) and possibly family members over the holidays.  Wondering what she can take for sinus congestion as she hasn't been able to sleep well.  S/p influenza vaccine.  Reviewed medications safe to take for congestion and cough in pregnancy.  Encouraged presentation to L&D for eval with fever.    Michelle Leonard MD MPH     "

## 2020-01-01 NOTE — TELEPHONE ENCOUNTER
"Returned answering service call for \"can't get med that was recom, ? Other options.\"  Pt states she couldn't get to the pharmacy because it's closed secondary to New Year's Day.  Wondering if she can take phenylephrine that she has at home.  Reviewed safe meds for congestion in pregnancy (limit dosing if able).  Vicks, other supportive cares also recommended.     Michelle Leonard MD MPH     "

## 2020-01-02 ENCOUNTER — TELEPHONE (OUTPATIENT)
Dept: OBGYN | Facility: CLINIC | Age: 30
End: 2020-01-02

## 2020-01-02 ENCOUNTER — HOSPITAL ENCOUNTER (OUTPATIENT)
Facility: CLINIC | Age: 30
LOS: 1 days | Discharge: HOME | End: 2020-01-02
Attending: OBSTETRICS & GYNECOLOGY | Admitting: OBSTETRICS & GYNECOLOGY
Payer: COMMERCIAL

## 2020-01-02 VITALS
WEIGHT: 185 LBS | BODY MASS INDEX: 30.82 KG/M2 | TEMPERATURE: 98.6 F | HEART RATE: 58 BPM | SYSTOLIC BLOOD PRESSURE: 126 MMHG | HEIGHT: 65 IN | DIASTOLIC BLOOD PRESSURE: 72 MMHG

## 2020-01-02 DIAGNOSIS — O34.219 PREVIOUS CESAREAN DELIVERY, ANTEPARTUM CONDITION OR COMPLICATION: ICD-10-CM

## 2020-01-02 DIAGNOSIS — O26.893 PREGNANCY HEADACHE IN THIRD TRIMESTER: Primary | ICD-10-CM

## 2020-01-02 DIAGNOSIS — R51.9 PREGNANCY HEADACHE IN THIRD TRIMESTER: Primary | ICD-10-CM

## 2020-01-02 PROBLEM — Z36.89 ENCOUNTER FOR TRIAGE IN PREGNANT PATIENT: Status: ACTIVE | Noted: 2020-01-02

## 2020-01-02 LAB
ABO + RH BLD: NORMAL
ABO + RH BLD: NORMAL
ALT SERPL W P-5'-P-CCNC: 16 U/L (ref 0–50)
AST SERPL W P-5'-P-CCNC: 15 U/L (ref 0–45)
BLD GP AB SCN SERPL QL: NORMAL
BLOOD BANK CMNT PATIENT-IMP: NORMAL
CREAT SERPL-MCNC: 0.66 MG/DL (ref 0.52–1.04)
CREAT UR-MCNC: 68 MG/DL
ERYTHROCYTE [DISTWIDTH] IN BLOOD BY AUTOMATED COUNT: 12.7 % (ref 10–15)
GFR SERPL CREATININE-BSD FRML MDRD: >90 ML/MIN/{1.73_M2}
HCT VFR BLD AUTO: 35.7 % (ref 35–47)
HGB BLD-MCNC: 11.5 G/DL (ref 11.7–15.7)
MCH RBC QN AUTO: 29 PG (ref 26.5–33)
MCHC RBC AUTO-ENTMCNC: 32.2 G/DL (ref 31.5–36.5)
MCV RBC AUTO: 90 FL (ref 78–100)
PLATELET # BLD AUTO: 178 10E9/L (ref 150–450)
PROT UR-MCNC: 0.56 G/L
PROT/CREAT 24H UR: 0.82 G/G CR (ref 0–0.2)
RBC # BLD AUTO: 3.97 10E12/L (ref 3.8–5.2)
SPECIMEN EXP DATE BLD: NORMAL
WBC # BLD AUTO: 7.2 10E9/L (ref 4–11)

## 2020-01-02 PROCEDURE — 84460 ALANINE AMINO (ALT) (SGPT): CPT | Performed by: STUDENT IN AN ORGANIZED HEALTH CARE EDUCATION/TRAINING PROGRAM

## 2020-01-02 PROCEDURE — 84450 TRANSFERASE (AST) (SGOT): CPT | Performed by: STUDENT IN AN ORGANIZED HEALTH CARE EDUCATION/TRAINING PROGRAM

## 2020-01-02 PROCEDURE — 25800030 ZZH RX IP 258 OP 636

## 2020-01-02 PROCEDURE — 85027 COMPLETE CBC AUTOMATED: CPT | Performed by: STUDENT IN AN ORGANIZED HEALTH CARE EDUCATION/TRAINING PROGRAM

## 2020-01-02 PROCEDURE — 36415 COLL VENOUS BLD VENIPUNCTURE: CPT | Performed by: STUDENT IN AN ORGANIZED HEALTH CARE EDUCATION/TRAINING PROGRAM

## 2020-01-02 PROCEDURE — 82565 ASSAY OF CREATININE: CPT | Performed by: STUDENT IN AN ORGANIZED HEALTH CARE EDUCATION/TRAINING PROGRAM

## 2020-01-02 PROCEDURE — 96374 THER/PROPH/DIAG INJ IV PUSH: CPT

## 2020-01-02 PROCEDURE — G0463 HOSPITAL OUTPT CLINIC VISIT: HCPCS | Mod: 25

## 2020-01-02 PROCEDURE — 86900 BLOOD TYPING SEROLOGIC ABO: CPT | Performed by: STUDENT IN AN ORGANIZED HEALTH CARE EDUCATION/TRAINING PROGRAM

## 2020-01-02 PROCEDURE — 59025 FETAL NON-STRESS TEST: CPT

## 2020-01-02 PROCEDURE — 84156 ASSAY OF PROTEIN URINE: CPT | Performed by: STUDENT IN AN ORGANIZED HEALTH CARE EDUCATION/TRAINING PROGRAM

## 2020-01-02 PROCEDURE — 87653 STREP B DNA AMP PROBE: CPT | Performed by: STUDENT IN AN ORGANIZED HEALTH CARE EDUCATION/TRAINING PROGRAM

## 2020-01-02 PROCEDURE — 86850 RBC ANTIBODY SCREEN: CPT | Performed by: STUDENT IN AN ORGANIZED HEALTH CARE EDUCATION/TRAINING PROGRAM

## 2020-01-02 PROCEDURE — 86901 BLOOD TYPING SEROLOGIC RH(D): CPT | Performed by: STUDENT IN AN ORGANIZED HEALTH CARE EDUCATION/TRAINING PROGRAM

## 2020-01-02 PROCEDURE — 25000128 H RX IP 250 OP 636: Performed by: STUDENT IN AN ORGANIZED HEALTH CARE EDUCATION/TRAINING PROGRAM

## 2020-01-02 RX ORDER — PSEUDOEPHEDRINE HCL 30 MG
60 TABLET ORAL EVERY 4 HOURS PRN
Status: ON HOLD | COMMUNITY
End: 2020-01-02

## 2020-01-02 RX ORDER — ACETAMINOPHEN 325 MG/1
325-650 TABLET ORAL EVERY 6 HOURS PRN
COMMUNITY
End: 2021-06-28

## 2020-01-02 RX ORDER — METOCLOPRAMIDE 10 MG/1
10 TABLET ORAL 4 TIMES DAILY PRN
Qty: 10 TABLET | Refills: 0 | Status: SHIPPED | OUTPATIENT
Start: 2020-01-02 | End: 2020-02-20

## 2020-01-02 RX ORDER — METOCLOPRAMIDE HYDROCHLORIDE 5 MG/ML
10 INJECTION INTRAMUSCULAR; INTRAVENOUS ONCE
Status: COMPLETED | OUTPATIENT
Start: 2020-01-02 | End: 2020-01-02

## 2020-01-02 RX ORDER — SODIUM CHLORIDE, SODIUM LACTATE, POTASSIUM CHLORIDE, CALCIUM CHLORIDE 600; 310; 30; 20 MG/100ML; MG/100ML; MG/100ML; MG/100ML
INJECTION, SOLUTION INTRAVENOUS
Status: COMPLETED
Start: 2020-01-02 | End: 2020-01-02

## 2020-01-02 RX ADMIN — METOCLOPRAMIDE 10 MG: 5 INJECTION, SOLUTION INTRAMUSCULAR; INTRAVENOUS at 11:01

## 2020-01-02 RX ADMIN — SODIUM CHLORIDE, POTASSIUM CHLORIDE, SODIUM LACTATE AND CALCIUM CHLORIDE 500 ML: 600; 310; 30; 20 INJECTION, SOLUTION INTRAVENOUS at 10:58

## 2020-01-02 ASSESSMENT — MIFFLIN-ST. JEOR: SCORE: 1555.03

## 2020-01-02 NOTE — PROGRESS NOTES
L&D Triage Note  2020  Ameena Cardoza  [id number]      HPI: Ameena Cardoza is a 29 year old  at 38w2d by 5w6d US who presented from home with concern for possible pre-eclampsia. She reports a headache for the past 12 hours before presentation. The headache is associated with nasal congestion and cough. She took Sudafed and Tylenol. The headache improved slightly, but she was concerned that it had not resolved. She took her BP with a home cuff and it was elevated to 161/97. She states that she tired and has not slept much due to her cold symptoms.  Her headache is associated with light intolerance, but no floaters or blurriness in vision. She denies chest pain, shortness of breath, fever, chills, nausea, vomiting or other systemic complaints. She denies vaginal bleeding or loss of fluid and is feeling normal fetal movement. She is not feeling contractions.     Her pregnancy has been complicated by:  -Polyhydramnios, resolved  -Pre-E with severe features/HELLP syndrome in prior pregnancy  -Hx CS x1 after undergoing IOL for the above  -IUGR in prior pregnancy  -UTI, s/p Keflex and negative KEREN  -Rubella equivocal    OBHX:   OB History    Para Term  AB Living   2 1 0 1 0 1   SAB TAB Ectopic Multiple Live Births   0 0 0 0 1      # Outcome Date GA Lbr Jose Miguel/2nd Weight Sex Delivery Anes PTL Lv   2 Current            1  16 30w6d  1.162 kg (2 lb 9 oz) F CS-LTranv Spinal Y CHARIS      Birth Comments: [place]Esevere,[place]      Complications: Preeclampsia/Hypertension, HELLP (hemolytic anemia/elev liver enzymes/low platelets in pregnancy)      Name: Yolanda       Obstetric Comments   PreE dx 30 wk, inpatient, BMZ, IOL, mag sulfate, C/S,7 weeks in NICU        Medications:   No current facility-administered medications on file prior to encounter.   acetaminophen (TYLENOL) 325 MG tablet, Take 325-650 mg by mouth every 6 hours as needed for mild pain  aspirin (ASA) 81 MG tablet, Take  1 tablet (81 mg) by mouth daily  Fish Oil-Cholecalciferol (FISH OIL + D3 PO),   FOLIC ACID PO, Take 1 mg by mouth daily      ROS: 10-point ROS negative except as indicated in HPI.    Physical Exam:  Vitals:    20 1100 20 1145 20 1315 20 1330   BP: 113/61 112/70 124/82 126/72   Pulse:    58   Temp:       TempSrc:       Weight:       Height:         General: alert, oriented female, resting in bed in NAD  CV: regular rate and rhythm  Lungs: clear bilaterally, no crackles or wheezes.   Abdomen: soft, gravid, non-tender. No RUQ or epigastric tenderness.   Extremities: bilateral lower extremities non-tender with no edema    SVE: closed/50/-4, soft, mid  Membranes: intact    FHT: baseline initially 160. Baseline changed to 130s, moderate variability, +accelerations, single variable deceleration to 90s, followed by >1.5 hours of category I FHT  [place]: q3-4 minutes    Prenatal ultrasounds:  Datinw6d US, XOCHITL 2020  Anatomy: 18w1d US, OET052 g, anatomy wnl (suboptimal views, follow up US with normal anatomy), 3VC, placenta anterior no previa  Growth: 37w3d US, EFW 3323 g (65%), MVP 7.7      Labs:   Results for orders placed or performed during the hospital encounter of 20 (from the past 24 hour(s))   Protein  random urine with Creat Ratio   Result Value Ref Range    Protein Random Urine 0.56 g/L    Protein Total Urine g/gr Creatinine 0.82 (H) 0 - 0.2 g/g Cr   Creatinine urine calculation only   Result Value Ref Range    Creatinine Urine 68 mg/dL   AST   Result Value Ref Range    AST 15 0 - 45 U/L   ALT   Result Value Ref Range    ALT 16 0 - 50 U/L   CBC with platelets   Result Value Ref Range    WBC 7.2 4.0 - 11.0 10e9/L    RBC Count 3.97 3.8 - 5.2 10e12/L    Hemoglobin 11.5 (L) 11.7 - 15.7 g/dL    Hematocrit 35.7 35.0 - 47.0 %    MCV 90 78 - 100 fl    MCH 29.0 26.5 - 33.0 pg    MCHC 32.2 31.5 - 36.5 g/dL    RDW 12.7 10.0 - 15.0 %    Platelet Count 178 150 - 450 10e9/L   Creatinine   Result  Value Ref Range    Creatinine 0.66 0.52 - 1.04 mg/dL    GFR Estimate >90 >60 mL/min/[1.73_m2]    GFR Estimate If Black >90 >60 mL/min/[1.73_m2]   ABO/Rh type and screen   Result Value Ref Range    ABO A     RH(D) Pos     Antibody Screen Neg     Test Valid Only At          [facility]    Specimen Expires 2020        Assessment: 29 year old  at 38w2d by 5w6d US, here for blood pressure monitoring and rule out pre-eclampsia during pregnancy complicated by history of pre-eclampsia with severe features, history of IUGR, prior  section, polyhydramnios (resolved). Does not meet criteria for gestational hypertension, but has proteinuria. Headache resolved.    Plan:    # Elevated blood pressure, rule out pre-eclampsia:  # History of severe pre-eclampsia:  - HELLP labs wnl, UPC 0.82  - Headache without vision changes. Improved with IVF hydration and Reglan. Plan to discharge home with Tylenol and Reglan for headache. Recommended rest and hydration. If headache gets worse, associated with vision changes, or does not respond to interventions, then she will return to L&D.  - Single mild range blood pressure (131/90) while in triage. Monitored for several hours after that elevated blood pressure with normal range blood pressure. Not meeting criteria for gestational hypertension at this time.   - Patient to discharge home with strict return precautions: return to labor and delivery with headache, vision changes, chest pain, shortness of breath, nausea/vomiting, abdominal pain, or a rapid increase in swelling.  She will follow up in clinic tomorrow 1/3 at 0945 for a blood pressure check. If elevated, she will come to L&D for delivery. She will be NPO prior to that visit. Desires repeat CS, currently scheduled .    # Contractions:   - Cervix closed on admission. Patient very comfortable and sleeping. Not in labor.   - Discussed signs and symptoms of labor,  including regular contractions and increased pelvic pressure. Will also return if LOF or VB, or decreased fetal movement.     #  Fetal well-being: Initially tachycardic, then baseline returned to normal with IVF hydration. Single variable deceleration to 90s. Overall reassuring with moderate variability and accels  - Category I FHT for >2 hours  - GBS unknown, collected and pending    # PNC:   - Prenatal labs reviewed. Rh pos, neisha negative, Hep B immune, Rubella equivocal (plan MMR postpartum)  - GCT passed (used apple juice, patient declined Glucola)  - Immunizations s/p Tdap and flu  - Placenta anterior, no previa  - GBS collected and pending    Patient seen and care plan discussed under supervision of Dr. Desai.    Nereida Guerrero MD  Obstetrics and Gyncology, PGY-2  January 2, 2020 , 2:58 PM    Patient reviewed with me, assessment and plan jointly made.   Alba Desai MD

## 2020-01-02 NOTE — PLAN OF CARE
Data: Patient presented to the [facility] at 0950.   Reason for maternal/fetal assessment per patient is Rule Out Pre-eclampsia  . Patient is a . Prenatal record reviewed.      OB History    Para Term  AB Living   2 1 0 1 0 1   SAB TAB Ectopic Multiple Live Births   0 0 0 0 1      # Outcome Date GA Lbr Jose Miguel/2nd Weight Sex Delivery Anes PTL Lv   2 Current            1  / 30w6d  1.162 kg (2 lb 9 oz) F CS-LTranv Spinal Y CHARIS      Birth Comments: [place]Esevere,[place]      Complications: Preeclampsia/Hypertension, HELLP (hemolytic anemia/elev liver enzymes/low platelets in pregnancy)      Name: Yolanda       Obstetric Comments   PreE dx 30 wk, inpatient, BMZ, IOL, mag sulfate, C/S,7 weeks in NICU       Medical History:   Past Medical History:   Diagnosis Date    Family history of breast cancer gene mutation in first degree relative     gene tested CHek 2 associated with increased risk  q6 mos mammo starting at 33     HELLP (hemolytic anemia/elev liver enzymes/low platelets in pregnancy)     PCOS (polycystic ovarian syndrome)     age 12 no treatment necessary     Preeclampsia, severe, third trimester     HTN, proteinuria, 30wks-->mag, IOL x 24 hrs->C/S   . Gestational Age 38w2d. VSS. Cervix: closed.  Fetal movement present. Patient denies cramping, backache, vaginal discharge, pelvic pressure, UTI symptoms, GI problems, bloody show, vaginal bleeding, edema,  epigastric or URQ pain, abdominal pain, rupture of membranes. Support persons are present.  Action: Verbal consent for EFM. Triage assessment completed. EFM applied for NST. Frequent BP have been normotensive. Uterine assessment reveal occasional contractions not felt by patient. Fetal assessment: Presumed adequate fetal oxygenation documented (see flow record). Patient education pamphlets given on fetal movement counts and signs of Pre-E as well as labor. Patient instructed to report change in fetal movement, vaginal leaking of  fluid or bleeding, abdominal pain, or any concerns related to the pregnancy to her nurse/physician.   Response: Dr. Desai informed of arrival. Plan per provider is discharge home undelivered. RTC tomorrow for blood pressure check. Patient verbalized understanding of education and verbalized agreement with plan.

## 2020-01-02 NOTE — TELEPHONE ENCOUNTER
Patient called to report symptoms concerning for pre-eclampsia including elevated blood pressure at home 160/97, and headache not relieved by Tylenol. Pt states she took sudafed yesterday and this morning at the recommendation of OB/GYN on-call, for sinus pressure and cold symptoms.     Pt has hx of Pre-E and HELLP in prior pregnancy.    She denies blurred vision but endorses sensitivity to light and screens. Denies uterine cramping, bleeding, or leaking fluids. Endorses mild nausea. Denies RUQ pain. Denies observation of fetal movement in first 30 minutes after waking.    Nurse advised to report to birthplace. Patient expressed understanding and agrees with plan.     Nurse reported to charge at L&D.

## 2020-01-02 NOTE — DISCHARGE INSTRUCTIONS
Discharge Instruction for Undelivered Patients      You were seen for: rule out pre eclampsia  You had (Test or Medicine):NST and lab work     Diet:   You may eat meals and snacks.     Activity:  Count fetal kicks everyday (see handout)     Call your provider if you notice:  Swelling in your face or increased swelling in your hands or legs.  Headaches that are not relieved by Tylenol (acetaminophen).  Changes in your vision (blurring: seeing spots or stars.)  Nausea (sick to your stomach) and vomiting (throwing up).   Weight gain of 5 pounds or more per week.  Heartburn that doesn't go away.  Signs of bladder infection: pain when you urinate (use the toilet), need to go more often and more urgently.  The bag of simon (rupture of membranes) breaks, or you notice leaking in your underwear.  Bright red blood in your underwear.  Abdominal (lower belly) or stomach pain.  Second (plus) baby: Contractions (tightening) less than 10 minutes apart and getting stronger.  Increase or change in vaginal discharge (note the color and amount)      Follow-up:  Make an appointment to be seen on tommorow for BP check

## 2020-01-03 ENCOUNTER — ALLIED HEALTH/NURSE VISIT (OUTPATIENT)
Dept: OBGYN | Facility: CLINIC | Age: 30
End: 2020-01-03
Payer: COMMERCIAL

## 2020-01-03 DIAGNOSIS — Z01.30 BLOOD PRESSURE CHECK: Primary | ICD-10-CM

## 2020-01-03 LAB
GP B STREP DNA SPEC QL NAA+PROBE: NEGATIVE
SPECIMEN SOURCE: NORMAL

## 2020-01-03 PROCEDURE — 40000269 ZZH STATISTIC NO CHARGE FACILITY FEE: Mod: ZF

## 2020-01-03 NOTE — NURSING NOTE
Chief Complaint   Patient presents with     Allied Health Visit     BP check       See DIANA De Jesus 1/3/2020    Pt here for BP check. She is still having a slight headache but not sure if it's from high blood pressure because she also has a head cold. Pt put on BP protocol and her BPs were 117/82, 117/83, 121/84, and average of 3 was 121/83. She has an appt on 1/6/2019 with Dr. Herrera.

## 2020-01-06 ENCOUNTER — ANCILLARY PROCEDURE (OUTPATIENT)
Dept: ULTRASOUND IMAGING | Facility: CLINIC | Age: 30
End: 2020-01-06
Attending: ADVANCED PRACTICE MIDWIFE
Payer: COMMERCIAL

## 2020-01-06 ENCOUNTER — OFFICE VISIT (OUTPATIENT)
Dept: OBGYN | Facility: CLINIC | Age: 30
End: 2020-01-06
Attending: OBSTETRICS & GYNECOLOGY
Payer: COMMERCIAL

## 2020-01-06 VITALS
DIASTOLIC BLOOD PRESSURE: 77 MMHG | WEIGHT: 183.5 LBS | BODY MASS INDEX: 30.57 KG/M2 | HEART RATE: 73 BPM | HEIGHT: 65 IN | SYSTOLIC BLOOD PRESSURE: 119 MMHG

## 2020-01-06 DIAGNOSIS — Z01.818 PRE-OP EXAM: Primary | ICD-10-CM

## 2020-01-06 DIAGNOSIS — O09.90 HIGH-RISK PREGNANCY, UNSPECIFIED TRIMESTER: ICD-10-CM

## 2020-01-06 LAB
ABO + RH BLD: NORMAL
ABO + RH BLD: NORMAL
BLD GP AB SCN SERPL QL: NORMAL
BLOOD BANK CMNT PATIENT-IMP: NORMAL
ERYTHROCYTE [DISTWIDTH] IN BLOOD BY AUTOMATED COUNT: 12.7 % (ref 10–15)
HCT VFR BLD AUTO: 37.6 % (ref 35–47)
HGB BLD-MCNC: 12.1 G/DL (ref 11.7–15.7)
MCH RBC QN AUTO: 28.7 PG (ref 26.5–33)
MCHC RBC AUTO-ENTMCNC: 32.2 G/DL (ref 31.5–36.5)
MCV RBC AUTO: 89 FL (ref 78–100)
PLATELET # BLD AUTO: 239 10E9/L (ref 150–450)
RBC # BLD AUTO: 4.22 10E12/L (ref 3.8–5.2)
SPECIMEN EXP DATE BLD: NORMAL
WBC # BLD AUTO: 6.9 10E9/L (ref 4–11)

## 2020-01-06 PROCEDURE — 36415 COLL VENOUS BLD VENIPUNCTURE: CPT | Performed by: OBSTETRICS & GYNECOLOGY

## 2020-01-06 PROCEDURE — 86900 BLOOD TYPING SEROLOGIC ABO: CPT | Performed by: OBSTETRICS & GYNECOLOGY

## 2020-01-06 PROCEDURE — 76819 FETAL BIOPHYS PROFIL W/O NST: CPT

## 2020-01-06 PROCEDURE — 86850 RBC ANTIBODY SCREEN: CPT | Performed by: OBSTETRICS & GYNECOLOGY

## 2020-01-06 PROCEDURE — G0463 HOSPITAL OUTPT CLINIC VISIT: HCPCS | Mod: ZF

## 2020-01-06 PROCEDURE — 86901 BLOOD TYPING SEROLOGIC RH(D): CPT | Performed by: OBSTETRICS & GYNECOLOGY

## 2020-01-06 PROCEDURE — 85027 COMPLETE CBC AUTOMATED: CPT | Performed by: OBSTETRICS & GYNECOLOGY

## 2020-01-06 ASSESSMENT — MIFFLIN-ST. JEOR: SCORE: 1548.23

## 2020-01-06 NOTE — LETTER
"2020       RE: Ameena Cardoza  [address]  [place] [address]     Dear Colleague,    Thank you for referring your patient, Ameena Cardoza, to the [facility] at [facility]. Please see a copy of my visit note below.    Feeling well. No HA or vision changes.  +FM.  Getting kick counts easily when she counts.      /77 (BP Location: Right arm, Patient Position: Chair)   Pulse 73   Ht 1.651 m (5' 5\")   Wt 83.2 kg (183 lb 8 oz)   LMP 2019 (Approximate)   Breastfeeding No   BMI 30.54 kg/m     See flow    A/p: 30 yo  at 38+6 wks, h/o Prior c/s and planned repeat with PPTL .    1. PNC: UTD    2. H/o Pre E: no s/s. F/u if HA, vision changes, RUQ pain    3. Pre op labs ordered.    Jacqueline Herrera MD, FACOG  [facility]  OB/GYN  2020  4:14 PM            "

## 2020-01-06 NOTE — PROGRESS NOTES
"Feeling well. No HA or vision changes.  +FM.  Getting kick counts easily when she counts.      /77 (BP Location: Right arm, Patient Position: Chair)   Pulse 73   Ht 1.651 m (5' 5\")   Wt 83.2 kg (183 lb 8 oz)   LMP 2019 (Approximate)   Breastfeeding No   BMI 30.54 kg/m    See flow    A/p: 30 yo  at 38+6 wks, h/o Prior c/s and planned repeat with PPTL .    1. PNC: UTD    2. H/o Pre E: no s/s. F/u if HA, vision changes, RUQ pain    3. Pre op labs ordered.    Jacqueline Herrera MD, FACOG  [facility]  OB/GYN    2020  4:14 PM      "

## 2020-01-07 ENCOUNTER — ANESTHESIA EVENT (OUTPATIENT)
Dept: OBGYN | Facility: CLINIC | Age: 30
End: 2020-01-07
Payer: COMMERCIAL

## 2020-01-07 RX ORDER — NALBUPHINE HYDROCHLORIDE 10 MG/ML
2.5-5 INJECTION, SOLUTION INTRAMUSCULAR; INTRAVENOUS; SUBCUTANEOUS EVERY 6 HOURS PRN
Status: CANCELLED | OUTPATIENT
Start: 2020-01-07

## 2020-01-07 RX ORDER — EPHEDRINE SULFATE 50 MG/ML
5 INJECTION, SOLUTION INTRAMUSCULAR; INTRAVENOUS; SUBCUTANEOUS
Status: CANCELLED | OUTPATIENT
Start: 2020-01-07

## 2020-01-07 RX ORDER — NALOXONE HYDROCHLORIDE 0.4 MG/ML
.1-.4 INJECTION, SOLUTION INTRAMUSCULAR; INTRAVENOUS; SUBCUTANEOUS
Status: CANCELLED | OUTPATIENT
Start: 2020-01-07

## 2020-01-08 ENCOUNTER — HOSPITAL ENCOUNTER (INPATIENT)
Facility: CLINIC | Age: 30
LOS: 2 days | Discharge: HOME | End: 2020-01-10
Attending: OBSTETRICS & GYNECOLOGY | Admitting: OBSTETRICS & GYNECOLOGY
Payer: COMMERCIAL

## 2020-01-08 ENCOUNTER — ANESTHESIA (OUTPATIENT)
Dept: OBGYN | Facility: CLINIC | Age: 30
End: 2020-01-08
Payer: COMMERCIAL

## 2020-01-08 DIAGNOSIS — O34.219 PREVIOUS CESAREAN DELIVERY, ANTEPARTUM CONDITION OR COMPLICATION: ICD-10-CM

## 2020-01-08 DIAGNOSIS — Z98.891 S/P CESAREAN SECTION: Primary | ICD-10-CM

## 2020-01-08 LAB — T PALLIDUM AB SER QL: NONREACTIVE

## 2020-01-08 PROCEDURE — 36415 COLL VENOUS BLD VENIPUNCTURE: CPT | Performed by: OBSTETRICS & GYNECOLOGY

## 2020-01-08 PROCEDURE — 25000132 ZZH RX MED GY IP 250 OP 250 PS 637: Performed by: STUDENT IN AN ORGANIZED HEALTH CARE EDUCATION/TRAINING PROGRAM

## 2020-01-08 PROCEDURE — 88302 TISSUE EXAM BY PATHOLOGIST: CPT | Performed by: STUDENT IN AN ORGANIZED HEALTH CARE EDUCATION/TRAINING PROGRAM

## 2020-01-08 PROCEDURE — 71000014 ZZH RECOVERY PHASE 1 LEVEL 2 FIRST HR: Performed by: OBSTETRICS & GYNECOLOGY

## 2020-01-08 PROCEDURE — 25000128 H RX IP 250 OP 636: Performed by: STUDENT IN AN ORGANIZED HEALTH CARE EDUCATION/TRAINING PROGRAM

## 2020-01-08 PROCEDURE — 37000008 ZZH ANESTHESIA TECHNICAL FEE, 1ST 30 MIN: Performed by: OBSTETRICS & GYNECOLOGY

## 2020-01-08 PROCEDURE — 12000001 ZZH R&B MED SURG/OB UMMC

## 2020-01-08 PROCEDURE — 27110028 ZZH OR GENERAL SUPPLY NON-STERILE: Performed by: OBSTETRICS & GYNECOLOGY

## 2020-01-08 PROCEDURE — 88302 TISSUE EXAM BY PATHOLOGIST: CPT | Mod: 26 | Performed by: STUDENT IN AN ORGANIZED HEALTH CARE EDUCATION/TRAINING PROGRAM

## 2020-01-08 PROCEDURE — 40000170 ZZH STATISTIC PRE-PROCEDURE ASSESSMENT II: Performed by: OBSTETRICS & GYNECOLOGY

## 2020-01-08 PROCEDURE — 25000125 ZZHC RX 250: Performed by: STUDENT IN AN ORGANIZED HEALTH CARE EDUCATION/TRAINING PROGRAM

## 2020-01-08 PROCEDURE — 86780 TREPONEMA PALLIDUM: CPT | Performed by: OBSTETRICS & GYNECOLOGY

## 2020-01-08 PROCEDURE — C9290 INJ, BUPIVACAINE LIPOSOME: HCPCS | Performed by: STUDENT IN AN ORGANIZED HEALTH CARE EDUCATION/TRAINING PROGRAM

## 2020-01-08 PROCEDURE — 71000015 ZZH RECOVERY PHASE 1 LEVEL 2 EA ADDTL HR: Performed by: OBSTETRICS & GYNECOLOGY

## 2020-01-08 PROCEDURE — 25800030 ZZH RX IP 258 OP 636: Performed by: OBSTETRICS & GYNECOLOGY

## 2020-01-08 PROCEDURE — 36000059 ZZH SURGERY LEVEL 3 EA 15 ADDTL MIN UMMC: Performed by: OBSTETRICS & GYNECOLOGY

## 2020-01-08 PROCEDURE — 0UB70ZZ EXCISION OF BILATERAL FALLOPIAN TUBES, OPEN APPROACH: ICD-10-PCS | Performed by: OBSTETRICS & GYNECOLOGY

## 2020-01-08 PROCEDURE — 25000132 ZZH RX MED GY IP 250 OP 250 PS 637: Performed by: OBSTETRICS & GYNECOLOGY

## 2020-01-08 PROCEDURE — 36000057 ZZH SURGERY LEVEL 3 1ST 30 MIN - UMMC: Performed by: OBSTETRICS & GYNECOLOGY

## 2020-01-08 PROCEDURE — 27210794 ZZH OR GENERAL SUPPLY STERILE: Performed by: OBSTETRICS & GYNECOLOGY

## 2020-01-08 PROCEDURE — 37000009 ZZH ANESTHESIA TECHNICAL FEE, EACH ADDTL 15 MIN: Performed by: OBSTETRICS & GYNECOLOGY

## 2020-01-08 RX ORDER — KETOROLAC TROMETHAMINE 30 MG/ML
30 INJECTION, SOLUTION INTRAMUSCULAR; INTRAVENOUS EVERY 6 HOURS
Status: COMPLETED | OUTPATIENT
Start: 2020-01-08 | End: 2020-01-09

## 2020-01-08 RX ORDER — NALOXONE HYDROCHLORIDE 0.4 MG/ML
.1-.4 INJECTION, SOLUTION INTRAMUSCULAR; INTRAVENOUS; SUBCUTANEOUS
Status: DISCONTINUED | OUTPATIENT
Start: 2020-01-08 | End: 2020-01-08

## 2020-01-08 RX ORDER — LIDOCAINE 40 MG/G
CREAM TOPICAL
Status: DISCONTINUED | OUTPATIENT
Start: 2020-01-08 | End: 2020-01-10 | Stop reason: HOSPADM

## 2020-01-08 RX ORDER — OXYTOCIN/0.9 % SODIUM CHLORIDE 30/500 ML
100 PLASTIC BAG, INJECTION (ML) INTRAVENOUS CONTINUOUS
Status: DISCONTINUED | OUTPATIENT
Start: 2020-01-08 | End: 2020-01-10 | Stop reason: HOSPADM

## 2020-01-08 RX ORDER — PHENYLEPHRINE HCL IN 0.9% NACL 1 MG/10 ML
SYRINGE (ML) INTRAVENOUS CONTINUOUS PRN
Status: DISCONTINUED | OUTPATIENT
Start: 2020-01-08 | End: 2020-01-08

## 2020-01-08 RX ORDER — OXYTOCIN/0.9 % SODIUM CHLORIDE 30/500 ML
PLASTIC BAG, INJECTION (ML) INTRAVENOUS CONTINUOUS PRN
Status: DISCONTINUED | OUTPATIENT
Start: 2020-01-08 | End: 2020-01-08

## 2020-01-08 RX ORDER — AMOXICILLIN 250 MG
1 CAPSULE ORAL 2 TIMES DAILY PRN
Status: DISCONTINUED | OUTPATIENT
Start: 2020-01-08 | End: 2020-01-10 | Stop reason: HOSPADM

## 2020-01-08 RX ORDER — HYDROMORPHONE HYDROCHLORIDE 1 MG/ML
.3-.5 INJECTION, SOLUTION INTRAMUSCULAR; INTRAVENOUS; SUBCUTANEOUS EVERY 30 MIN PRN
Status: DISCONTINUED | OUTPATIENT
Start: 2020-01-08 | End: 2020-01-10 | Stop reason: HOSPADM

## 2020-01-08 RX ORDER — HYDROCORTISONE 2.5 %
CREAM (GRAM) TOPICAL 3 TIMES DAILY PRN
Status: DISCONTINUED | OUTPATIENT
Start: 2020-01-08 | End: 2020-01-10 | Stop reason: HOSPADM

## 2020-01-08 RX ORDER — OXYCODONE HYDROCHLORIDE 5 MG/1
5-10 TABLET ORAL
Status: DISCONTINUED | OUTPATIENT
Start: 2020-01-08 | End: 2020-01-10 | Stop reason: HOSPADM

## 2020-01-08 RX ORDER — CEFAZOLIN SODIUM 1 G/3ML
1 INJECTION, POWDER, FOR SOLUTION INTRAMUSCULAR; INTRAVENOUS SEE ADMIN INSTRUCTIONS
Status: DISCONTINUED | OUTPATIENT
Start: 2020-01-08 | End: 2020-01-08 | Stop reason: HOSPADM

## 2020-01-08 RX ORDER — CEFAZOLIN SODIUM 2 G/100ML
2 INJECTION, SOLUTION INTRAVENOUS
Status: DISCONTINUED | OUTPATIENT
Start: 2020-01-08 | End: 2020-01-08 | Stop reason: HOSPADM

## 2020-01-08 RX ORDER — FENTANYL CITRATE 50 UG/ML
INJECTION, SOLUTION INTRAMUSCULAR; INTRAVENOUS PRN
Status: DISCONTINUED | OUTPATIENT
Start: 2020-01-08 | End: 2020-01-08

## 2020-01-08 RX ORDER — METHYLERGONOVINE MALEATE 0.2 MG/ML
200 INJECTION INTRAVENOUS
Status: DISCONTINUED | OUTPATIENT
Start: 2020-01-08 | End: 2020-01-10 | Stop reason: HOSPADM

## 2020-01-08 RX ORDER — SIMETHICONE 80 MG
80 TABLET,CHEWABLE ORAL 4 TIMES DAILY PRN
Status: DISCONTINUED | OUTPATIENT
Start: 2020-01-08 | End: 2020-01-10 | Stop reason: HOSPADM

## 2020-01-08 RX ORDER — DEXTROSE, SODIUM CHLORIDE, SODIUM LACTATE, POTASSIUM CHLORIDE, AND CALCIUM CHLORIDE 5; .6; .31; .03; .02 G/100ML; G/100ML; G/100ML; G/100ML; G/100ML
INJECTION, SOLUTION INTRAVENOUS CONTINUOUS
Status: DISCONTINUED | OUTPATIENT
Start: 2020-01-08 | End: 2020-01-10 | Stop reason: HOSPADM

## 2020-01-08 RX ORDER — ACETAMINOPHEN 325 MG/1
650 TABLET ORAL EVERY 4 HOURS PRN
Status: DISCONTINUED | OUTPATIENT
Start: 2020-01-11 | End: 2020-01-10 | Stop reason: HOSPADM

## 2020-01-08 RX ORDER — KETOROLAC TROMETHAMINE 30 MG/ML
INJECTION, SOLUTION INTRAMUSCULAR; INTRAVENOUS PRN
Status: DISCONTINUED | OUTPATIENT
Start: 2020-01-08 | End: 2020-01-08

## 2020-01-08 RX ORDER — NALOXONE HYDROCHLORIDE 0.4 MG/ML
.1-.4 INJECTION, SOLUTION INTRAMUSCULAR; INTRAVENOUS; SUBCUTANEOUS
Status: DISCONTINUED | OUTPATIENT
Start: 2020-01-08 | End: 2020-01-10 | Stop reason: HOSPADM

## 2020-01-08 RX ORDER — BUPIVACAINE HYDROCHLORIDE 2.5 MG/ML
INJECTION, SOLUTION EPIDURAL; INFILTRATION; INTRACAUDAL PRN
Status: DISCONTINUED | OUTPATIENT
Start: 2020-01-08 | End: 2020-01-08

## 2020-01-08 RX ORDER — SODIUM CHLORIDE, SODIUM LACTATE, POTASSIUM CHLORIDE, CALCIUM CHLORIDE 600; 310; 30; 20 MG/100ML; MG/100ML; MG/100ML; MG/100ML
INJECTION, SOLUTION INTRAVENOUS CONTINUOUS
Status: DISCONTINUED | OUTPATIENT
Start: 2020-01-08 | End: 2020-01-08 | Stop reason: HOSPADM

## 2020-01-08 RX ORDER — NALBUPHINE HYDROCHLORIDE 10 MG/ML
2.5-5 INJECTION, SOLUTION INTRAMUSCULAR; INTRAVENOUS; SUBCUTANEOUS EVERY 6 HOURS PRN
Status: DISCONTINUED | OUTPATIENT
Start: 2020-01-08 | End: 2020-01-08

## 2020-01-08 RX ORDER — CITRIC ACID/SODIUM CITRATE 334-500MG
30 SOLUTION, ORAL ORAL
Status: COMPLETED | OUTPATIENT
Start: 2020-01-08 | End: 2020-01-08

## 2020-01-08 RX ORDER — CARBOPROST TROMETHAMINE 250 UG/ML
250 INJECTION, SOLUTION INTRAMUSCULAR
Status: DISCONTINUED | OUTPATIENT
Start: 2020-01-08 | End: 2020-01-10 | Stop reason: HOSPADM

## 2020-01-08 RX ORDER — LIDOCAINE 40 MG/G
CREAM TOPICAL
Status: DISCONTINUED | OUTPATIENT
Start: 2020-01-08 | End: 2020-01-08

## 2020-01-08 RX ORDER — AMOXICILLIN 250 MG
2 CAPSULE ORAL 2 TIMES DAILY PRN
Status: DISCONTINUED | OUTPATIENT
Start: 2020-01-08 | End: 2020-01-10 | Stop reason: HOSPADM

## 2020-01-08 RX ORDER — OXYTOCIN/0.9 % SODIUM CHLORIDE 30/500 ML
340 PLASTIC BAG, INJECTION (ML) INTRAVENOUS CONTINUOUS PRN
Status: DISCONTINUED | OUTPATIENT
Start: 2020-01-08 | End: 2020-01-10 | Stop reason: HOSPADM

## 2020-01-08 RX ORDER — EPHEDRINE SULFATE 50 MG/ML
5 INJECTION, SOLUTION INTRAMUSCULAR; INTRAVENOUS; SUBCUTANEOUS
Status: DISCONTINUED | OUTPATIENT
Start: 2020-01-08 | End: 2020-01-08

## 2020-01-08 RX ORDER — BUPIVACAINE HYDROCHLORIDE 7.5 MG/ML
INJECTION, SOLUTION INTRASPINAL PRN
Status: DISCONTINUED | OUTPATIENT
Start: 2020-01-08 | End: 2020-01-08

## 2020-01-08 RX ORDER — MORPHINE SULFATE 1 MG/ML
INJECTION, SOLUTION EPIDURAL; INTRATHECAL; INTRAVENOUS PRN
Status: DISCONTINUED | OUTPATIENT
Start: 2020-01-08 | End: 2020-01-08

## 2020-01-08 RX ORDER — IBUPROFEN 800 MG/1
800 TABLET, FILM COATED ORAL EVERY 6 HOURS PRN
Status: DISCONTINUED | OUTPATIENT
Start: 2020-01-08 | End: 2020-01-10 | Stop reason: HOSPADM

## 2020-01-08 RX ORDER — ONDANSETRON 2 MG/ML
4 INJECTION INTRAMUSCULAR; INTRAVENOUS EVERY 6 HOURS PRN
Status: DISCONTINUED | OUTPATIENT
Start: 2020-01-08 | End: 2020-01-10 | Stop reason: HOSPADM

## 2020-01-08 RX ORDER — MISOPROSTOL 200 UG/1
800 TABLET ORAL
Status: DISCONTINUED | OUTPATIENT
Start: 2020-01-08 | End: 2020-01-10 | Stop reason: HOSPADM

## 2020-01-08 RX ORDER — OXYTOCIN 10 [USP'U]/ML
10 INJECTION, SOLUTION INTRAMUSCULAR; INTRAVENOUS
Status: DISCONTINUED | OUTPATIENT
Start: 2020-01-08 | End: 2020-01-10 | Stop reason: HOSPADM

## 2020-01-08 RX ORDER — LANOLIN 100 %
OINTMENT (GRAM) TOPICAL
Status: DISCONTINUED | OUTPATIENT
Start: 2020-01-08 | End: 2020-01-10 | Stop reason: HOSPADM

## 2020-01-08 RX ORDER — BISACODYL 10 MG
10 SUPPOSITORY, RECTAL RECTAL DAILY PRN
Status: DISCONTINUED | OUTPATIENT
Start: 2020-01-10 | End: 2020-01-10 | Stop reason: HOSPADM

## 2020-01-08 RX ORDER — ACETAMINOPHEN 325 MG/1
975 TABLET ORAL EVERY 8 HOURS
Status: DISCONTINUED | OUTPATIENT
Start: 2020-01-08 | End: 2020-01-10 | Stop reason: HOSPADM

## 2020-01-08 RX ADMIN — BUPIVACAINE 20 ML: 13.3 INJECTION, SUSPENSION, LIPOSOMAL INFILTRATION at 10:12

## 2020-01-08 RX ADMIN — SODIUM CHLORIDE, SODIUM LACTATE, POTASSIUM CHLORIDE, CALCIUM CHLORIDE AND DEXTROSE MONOHYDRATE: 5; 600; 310; 30; 20 INJECTION, SOLUTION INTRAVENOUS at 17:20

## 2020-01-08 RX ADMIN — KETOROLAC TROMETHAMINE 30 MG: 30 INJECTION, SOLUTION INTRAMUSCULAR at 09:46

## 2020-01-08 RX ADMIN — SODIUM CITRATE AND CITRIC ACID MONOHYDRATE 30 ML: 500; 334 SOLUTION ORAL at 08:00

## 2020-01-08 RX ADMIN — KETOROLAC TROMETHAMINE 30 MG: 30 INJECTION, SOLUTION INTRAMUSCULAR at 15:37

## 2020-01-08 RX ADMIN — OXYTOCIN-SODIUM CHLORIDE 0.9% IV SOLN 30 UNIT/500ML 300 ML/HR: 30-0.9/5 SOLUTION at 09:11

## 2020-01-08 RX ADMIN — FENTANYL CITRATE 15 MCG: 50 INJECTION, SOLUTION INTRAMUSCULAR; INTRAVENOUS at 08:49

## 2020-01-08 RX ADMIN — ACETAMINOPHEN 975 MG: 325 TABLET, FILM COATED ORAL at 11:21

## 2020-01-08 RX ADMIN — NALBUPHINE HYDROCHLORIDE 5 MG: 10 INJECTION, SOLUTION INTRAMUSCULAR; INTRAVENOUS; SUBCUTANEOUS at 11:20

## 2020-01-08 RX ADMIN — SODIUM CHLORIDE, POTASSIUM CHLORIDE, SODIUM LACTATE AND CALCIUM CHLORIDE: 600; 310; 30; 20 INJECTION, SOLUTION INTRAVENOUS at 08:31

## 2020-01-08 RX ADMIN — SENNOSIDES AND DOCUSATE SODIUM 2 TABLET: 8.6; 5 TABLET ORAL at 20:01

## 2020-01-08 RX ADMIN — BUPIVACAINE HYDROCHLORIDE 20 ML: 2.5 INJECTION, SOLUTION EPIDURAL; INFILTRATION; INTRACAUDAL at 10:12

## 2020-01-08 RX ADMIN — SODIUM CHLORIDE, POTASSIUM CHLORIDE, SODIUM LACTATE AND CALCIUM CHLORIDE: 600; 310; 30; 20 INJECTION, SOLUTION INTRAVENOUS at 08:58

## 2020-01-08 RX ADMIN — SODIUM CHLORIDE, POTASSIUM CHLORIDE, SODIUM LACTATE AND CALCIUM CHLORIDE 1000 ML: 600; 310; 30; 20 INJECTION, SOLUTION INTRAVENOUS at 07:59

## 2020-01-08 RX ADMIN — Medication 50 MCG/MIN: at 08:49

## 2020-01-08 RX ADMIN — KETOROLAC TROMETHAMINE 30 MG: 30 INJECTION, SOLUTION INTRAMUSCULAR at 21:53

## 2020-01-08 RX ADMIN — MORPHINE SULFATE 0.1 MG: 1 INJECTION, SOLUTION EPIDURAL; INTRATHECAL; INTRAVENOUS at 08:49

## 2020-01-08 RX ADMIN — BUPIVACAINE HYDROCHLORIDE IN DEXTROSE 1.6 ML: 7.5 INJECTION, SOLUTION SUBARACHNOID at 08:49

## 2020-01-08 RX ADMIN — ONDANSETRON 4 MG: 2 INJECTION INTRAMUSCULAR; INTRAVENOUS at 20:09

## 2020-01-08 RX ADMIN — Medication 100 ML/HR: at 12:22

## 2020-01-08 RX ADMIN — ACETAMINOPHEN 975 MG: 325 TABLET, FILM COATED ORAL at 20:01

## 2020-01-08 ASSESSMENT — MIFFLIN-ST. JEOR: SCORE: 1545.96

## 2020-01-08 NOTE — ANESTHESIA CARE TRANSFER NOTE
Patient: Ameena Cardoza    Procedure(s):   SECTION, WITH POSTPARTUM TUBAL LIGATION    Diagnosis: Previous  delivery, antepartum condition or complication [O34.219]  Diagnosis Additional Information: No value filed.    Anesthesia Type:   Spinal, Peripheral Nerve Block, For Post-op pain in coordination with surgeon     Note:  Airway :Room Air  Patient transferred to:PACU  Handoff Report: Identifed the Patient, Identified the Reponsible Provider, Reviewed the pertinent medical history, Discussed the surgical course, Reviewed Intra-OP anesthesia mangement and issues during anesthesia, Set expectations for post-procedure period and Allowed opportunity for questions and acknowledgement of understanding      Vitals: (Last set prior to Anesthesia Care Transfer)    CRNA VITALS  2020 0943 - 2020 1018      2020             Pulse:  60                Electronically Signed By: Jhonny Clayton MD  2020  10:18 AM

## 2020-01-08 NOTE — ANESTHESIA POSTPROCEDURE EVALUATION
Anesthesia POST Procedure Evaluation    Patient: Ameena Cardoza   MRN:     [id number] Gender:   female   Age:    29 year old :      1990        Preoperative Diagnosis: Previous  delivery, antepartum condition or complication [O34.219]   Procedure(s):   SECTION, WITH POSTPARTUM TUBAL LIGATION   Postop Comments: No value filed.       Anesthesia Type:  Not documented  Spinal, Peripheral Nerve Block, For Post-op pain in coordination with surgeon    Reportable Event: NO     PAIN: Uncomplicated   Sign Out status: Comfortable, Well controlled pain     PONV: No PONV   Sign Out status:  No Nausea or Vomiting     Neuro/Psych: Uneventful perioperative course   Sign Out Status: Preoperative baseline; Age appropriate mentation     Airway/Resp.: Uneventful perioperative course   Sign Out Status: Non labored breathing, age appropriate RR; Resp. Status within EXPECTED Parameters     CV: Uneventful perioperative course   Sign Out status: Appropriate BP and perfusion indices; Appropriate HR/Rhythm     Disposition:   Sign Out in:  Labor and Delivery  Disposition:  Labor and Delivery  Recovery Course: Uneventful  Follow-Up: Not required           Last Anesthesia Record Vitals:  CRNA VITALS  2020 0943 - 2020 1043      2020             Pulse:  60    NIBP Mean:  88          Last PACU Vitals:  Vitals Value Taken Time   /79 2020 11:05 AM   Temp 36.4  C (97.6  F) 2020 10:20 AM   Pulse 49 2020 11:05 AM   Resp 17 2020 11:07 AM   SpO2 100 % 2020 11:07 AM   Temp src     NIBP     Pulse     SpO2     Resp     Temp     Ht Rate     Temp 2     Vitals shown include unvalidated device data.      Electronically Signed By: Gisselle Arce MD, 2020, 11:08 AM

## 2020-01-08 NOTE — ANESTHESIA PROCEDURE NOTES
Spinal/LP Procedure Note    Spinal Block  Date/Time: 1/8/2020 8:49 AM  Staff:     Anesthesiologist:  Gisselle Arce MD    Resident/CRNA:  Jhonny Clayton MD    Spinal/LP performed by resident/CRNA in presence of a teaching physician.    Location: OB  Procedure Start/Stop Times:      1/8/2020 8:40 AM     1/8/2020 8:49 AM    patient identified, IV checked, site marked, risks and benefits discussed, informed consent, monitors and equipment checked, pre-op evaluation, at physician/surgeon's request and post-op pain management      Correct Patient: Yes      Correct Position: Yes      Correct Site: Yes      Correct Procedure: Yes      Correct Laterality:  N/A    Site Marked:  N/A  Procedure:     Procedure:  Intrathecal    ASA:  2    Diagnosis:  C scetion     Position:  Sitting    Sterile Prep: chloraprep      Insertion site:  L4-5    Approach:  Midline    Needle Type:  Wyatt    Needle gauge (G):  25    Local Skin Infiltration:  1% lidocaine    Introducer used: Yes      Attempts:  3    Redirects:  1    CSF:  Clear    Paresthesias:  No  Assessment/Narrative:     Sensory Level:  T4

## 2020-01-08 NOTE — PLAN OF CARE
VSS. Fundus Midline, firm, and U/1. Scant lochia. Incision covered, dressing C/D/I. Pain adequately managed with tylenol and toradol. Feeling slightly nauseated yet hungry, eating some crackers. Segovia catheter in place, adequate output. Breastfeeding independently. Bonding well with . Continue with plan of care.

## 2020-01-08 NOTE — PROGRESS NOTES
Patient arrived to [facility] unit via zoom cart at 1200,with belongings, accompanied by spouse/ significant other, with infant in arms. Received report from Opal GUERRERO RN and checked bands. Unit and room orientation completd. Call light given; no concerns present at this time. Continue with plan of care.

## 2020-01-08 NOTE — PLAN OF CARE
Data: Ameena Cardoza transferred to 7-123 via cart at 1150. Baby transferred via patient's arms.  Action: Receiving unit notified of transfer: Yes. Patient and family notified of room change. Report given to ASAF Lopez at 1115. Belongings sent to receiving unit. Accompanied by Registered Nurse. Oriented patient to surroundings. Call light within reach. ID bands double-checked with receiving RN.  Response: Patient tolerated transfer and is stable.

## 2020-01-08 NOTE — ANESTHESIA PROCEDURE NOTES
Peripheral Nerve Block Procedure Note    Staff:     Anesthesiologist:  Gisselle Arce MD    Resident/CRNA:  Jhonny Clayton MD    Block performed by resident/CRNA in the presence of a teaching physician    Location: OB  Procedure Start/Stop TImes:      1/8/2020 10:00 AM     1/8/2020 10:12 AM    patient identified, IV checked, site marked, risks and benefits discussed, informed consent, monitors and equipment checked, pre-op evaluation, at physician/surgeon's request and post-op pain management      Correct Patient: Yes      Correct Position: Yes      Correct Site: Yes      Correct Procedure: Yes      Correct Laterality:  Yes    Site Marked:  Yes  Procedure details:     Procedure:  TAP    ASA:  2    Diagnosis:  POst op pain control     Laterality:  Bilateral    Position:  Supine    Sterile Prep: chloraprep      Local skin infiltration:  None    Insertion Site:  T10-11    Needle:  Short bevel    Needle length (inches):  4    Ultrasound: Yes      Ultrasound used to identify targeted nerve, plexus, or vascular structure and placed a needle adjacent to it      Permanent Image entered into patiient's record      Abnormal pain on injection: No      Blood Aspirated: No      Paresthesias:  No    Bleeding at site: No      Bolus via:  Needle    Infusion Method:  Single Shot    Complications:  None  Assessment/Narrative:     Injection made incrementally with aspirations every (mL):  5

## 2020-01-08 NOTE — PLAN OF CARE
Scheduled AM C/S Admit Note  Ameena Cardoza  MRN: [id number]  Gestational Age: 39w1d      Ameena Cardoza presents for scheduled repeat  section and tubal ligation for.  Patient denies contractions, bleeding or LOF.  NPO since midnight.    Past Medical History:   Diagnosis Date    Family history of breast cancer gene mutation in first degree relative     gene tested CHek 2 associated with increased risk  q6 mos mammo starting at 33     HELLP (hemolytic anemia/elev liver enzymes/low platelets in pregnancy)     PCOS (polycystic ovarian syndrome)     age 12 no treatment necessary     Preeclampsia, severe, third trimester     HTN, proteinuria, 30wks-->mag, IOL x 24 hrs->C/S     Past Surgical History:   Procedure Laterality Date     SECTION      30 wks gestation, HTN, induction->C/S,        Dr Weber and Dr Phillips notified of patient's arrival and condition.    FHT: 130  NST: Reactive.    Plan:  - section with bilateral salpingectomy at 0830,

## 2020-01-08 NOTE — DISCHARGE SUMMARY
[facility] Discharge Summary    Ameena Cardoza MRN# [id number]   Age: 29 year old YOB: 1990     Date of Admission:  20   Date of Discharge:  1/10/2020  Admitting Physician:  Ana Phillips MD  Discharge Physician:  Ena Espinosa MD    Admit Dx:   - Intrauterine pregnancy at 39w1d  - history of  x1  - desires permanent sterilization  - h/o HELLP syndrome/preeclampsia with severe features     Discharge Dx:  - Same as above, s/p repeat low transverse  section    Procedures:  - Repeat low transverse  section with double layer uterine closure via Pfannenstiel incision, bilateral salpingectomy  - Spinal analgesia  - TAP block    Admit HPI:  Ameena Cardoza is a 29 year old  at 39w1d admitted for scheduled repeat  and bilateral tubal ligation.  The risks, benefits, and alternatives of  section were discussed with the patient, and she agreed to proceed.   Please see her admit H&P for full details of her PMH, PSH, Meds, Allergies and exam on admit.    Operative Course:  Surgery was uncomplicated. QBL from the delivery was 402 mL. Please see her  Section Operative Note for full details regarding her delivery.    Operative Findings:   1. Moderate recto-fascial adhesions. Omental adhesion to left anterior side side wall.  2. Clear amniotic fluid  3. Liveborn male infant in cephalic presentation. Apgars 9 at 1 minute & 9 at 5 minutes. Weight 3730 g.  4. Normal uterus, fallopian tubes, and ovaries. Fallopian tubes surgically removed and site hemostatic prior to closure.    Postoperative Course:  Her postoperative course was uncomplicated. On POD#2, she was meeting all of her postpartum goals and deemed stable for discharge. She was voiding without difficulty, tolerating a regular diet without nausea and vomiting, her pain was well controlled on oral pain medicines and her lochia was appropriate. Her hemoglobin  prior to delivery was 12.1 and after delivery was 10.5. Her Rh status was pos and Rhogam was not indicated.    Discharge Medications:     Review of your medicines      START taking      Dose / Directions   ibuprofen 600 MG tablet  Commonly known as:  ADVIL/MOTRIN      Dose:  600 mg  Take 1 tablet (600 mg) by mouth every 6 hours as needed for moderate pain Start after delivery  Quantity:  60 tablet  Refills:  0     oxyCODONE 5 MG tablet  Commonly known as:  ROXICODONE      Dose:  5 mg  Take 1 tablet (5 mg) by mouth every 6 hours as needed for pain  Quantity:  5 tablet  Refills:  0     senna-docusate 8.6-50 MG tablet  Commonly known as:  SENOKOT-S/PERICOLACE      Dose:  1 tablet  Take 1 tablet by mouth daily Start after delivery.  Quantity:  100 tablet  Refills:  0        CONTINUE these medicines which may have CHANGED, or have new prescriptions. If we are uncertain of the size of tablets/capsules you have at home, strength may be listed as something that might have changed.      Dose / Directions   * acetaminophen 325 MG tablet  Commonly known as:  TYLENOL  This may have changed:  Another medication with the same name was added. Make sure you understand how and when to take each.      Dose:  325-650 mg  Take 325-650 mg by mouth every 6 hours as needed for mild pain  Refills:  0     * acetaminophen 325 MG tablet  Commonly known as:  TYLENOL  This may have changed:  You were already taking a medication with the same name, and this prescription was added. Make sure you understand how and when to take each.      Dose:  650 mg  Take 2 tablets (650 mg) by mouth every 6 hours as needed for mild pain Start after Delivery.  Quantity:  100 tablet  Refills:  0         * This list has 2 medication(s) that are the same as other medications prescribed for you. Read the directions carefully, and ask your doctor or other care provider to review them with you.            CONTINUE these medicines which have NOT CHANGED      Dose /  Directions   aspirin 81 MG tablet  Commonly known as:  ASA  Used for:  High-risk pregnancy, unspecified trimester, History of severe pre-eclampsia      Dose:  81 mg  Take 1 tablet (81 mg) by mouth daily  Quantity:  100 tablet  Refills:  3     FISH OIL + D3 PO      Refills:  0     FOLIC ACID PO      Dose:  1 mg  Take 1 mg by mouth daily  Refills:  0     metoclopramide 10 MG tablet  Commonly known as:  REGLAN  Used for:  Pregnancy headache in third trimester      Dose:  10 mg  Take 1 tablet (10 mg) by mouth 4 times daily as needed (nausea, headache)  Quantity:  10 tablet  Refills:  0     vitamin B complex with vitamin C tablet      Dose:  1 tablet  Take 1 tablet by mouth as needed (for fatigue)  Refills:  0           Where to get your medicines      These medications were sent to [facility] [place], [place] - [address]  [address] [facility] [address], [facility] [address]    Phone:  [phone]     acetaminophen 325 MG tablet    ibuprofen 600 MG tablet    senna-docusate 8.6-50 MG tablet     Some of these will need a paper prescription and others can be bought over the counter. Ask your nurse if you have questions.    Bring a paper prescription for each of these medications    oxyCODONE 5 MG tablet           Discharge/Disposition:  Ameena Cardoza was discharged to home in stable condition with the following instructions/medications:  1) Call for temperature > 100.4, bright red vaginal bleeding >1 pad an hour x 2 hours, foul smelling vaginal discharge, pain not controlled by usual oral pain meds, persistent nausea and vomiting not controlled on medications, drainage or redness from incision site  2) S/p BTL for contraception.  3) For feeding she decided to breastfeed.  4) She was instructed to follow-up with her primary OB in 6 weeks for a routine postpartum visit.  5) Discharge activity:  No heavy lifting >15 lbs or strenuous activity for 6 weeks, pelvic rest for 6 weeks, no driving or operating  machinery while on narcotics.    Adia Weber MD  OB/GYN PGY-2  01/10/20 6:45 AM

## 2020-01-08 NOTE — BRIEF OP NOTE
[facility]   Brief Operative Note     Surgery Date: 2020    Surgeon:  Ana Phillips MD    Assistants:  DEMARCUS Lazaro Fellow    Adia Weber, PGY-2    Pre-op Diagnosis:  1. Intrauterine pregnancy at 39w1d     2. Rubella equivocal     3. History of pre-eclampsia with severe features/HELLP syndrome     4. History of prior  section      5. Desire for permanent sterilization    Post-op Diagnosis:  1. Same      2. Liveborn male infant     Procedure:  Repeat low-transverse  section with double layer uterine closure via Pfannenstiel incision and bilateral salpingectomy    Anesthesia: Spinal    QBL:  402 mL    IVF:  1500 mL crystalloid    UOP:  300 mL clear urine at the end of the case    Drains: Segovia Catheter     Specimens:  Cord blood, bilateral fallopian tubes    Complications: None apparent    Findings:   1. Moderate recto-fascial adhesions. Omental adhesion to left anterior side side wall.  2. Clear amniotic fluid  3. Liveborn male infant in cephalic presentation. Apgars 9 at 1 minute & 9 at 5 minutes. Weight pending.  4. Normal uterus, fallopian tubes, and ovaries. Fallopian tubes surgically removed and site hemostatic prior to closure.    Disposition:  Transferred in stable condition to CATHY Ashley MD  Maternal Fetal Medicine Fellow  2020 9:49 AM

## 2020-01-08 NOTE — OP NOTE
[facility]  Full Operative Progress Note     Surgery Date:  2020  Surgeon:  Ana Phillips MD  Assistants:  Selena Ashley MD, fellow, Adia Weber MD PGY-2      Pre-op Diagnosis:  1. Intrauterine pregnancy at 39w1d     2. History of  x1     3. H/o HELLP syndrome     4. Desires permanent sterilization     Post-op Diagnosis:  1. Same      2. Liveborn male infant   Procedure:  Repeat low-transverse  section with double layer uterine closure via Pfannenstiel incision, bilateral salpingectomy    Anesthesia: spinal  QBL:  402 mL  IVF:  1500 mL crystalloid  UOP:  50 mL clear urine at the end of the case  Drains: Segovia Catheter   Specimens:  Cord blood, bilateral fallopian tubes  Complications: None   Indications:   Ameena Cardoza is a 29 year old  at 39w1d admitted for scheduled repeat  and bilateral tubal ligation.  The risks, benefits, and alternatives of  section were discussed with the patient, and she agreed to proceed.     Findings:   1. Moderate recto-fascial adhesions. Omental adhesion to left anterior side side wall.  2. Clear amniotic fluid  3. Liveborn male infant in cephalic presentation. Apgars 9 at 1 minute & 9 at 5 minutes. Weight pending.  4. Normal uterus, fallopian tubes, and ovaries. Fallopian tubes surgically removed and site hemostatic prior to closure.    Procedure Details:   The patient was brought to the OR, where adequate spinal anesthesia was administered.  She was placed in the dorsal supine position with a slight leftward tilt. She was prepped and draped in the usual sterile fashion. A surgical time out was performed. A pfannenstiel skin incision was made with the scalpel, and carried down to the underlying fascia with sharp and blunt dissection. The fascia was incised in the midline, and the incision was extended laterally with the Quinones scissors. The superior aspect of the fascia was grasped with the Kocher clamps and  dissected off of the underlying rectus muscles with blunt and sharp dissection. Attention was then turned to the inferior aspect of the fascia, which was similarly dissected off of the underlying rectus muscles. The rectus muscles were  in the midline, and the peritoneum was entered bluntly, and the opening was extended with digital pressure. The bladder blade was placed.The vesicouterine peritoneum was incised in the midline, and the incision was extended laterally with the Metzenbaum scissors. A bladder flap was created digitally and the bladder blade was replaced. A transverse hysterotomy was made with the scalpel in the lower uterine segment, and the incision was extended with digital pressure. The infant was noted to be in the KENNETH position, and was delivered atraumatically. The shoulders delivered easily.  No nuchal cord was noted. The cord was doubly clamped and cut, and the infant was handed off to the awaiting nursery staff. A segment of cord was cut and held as needed. The placenta was delivered with gentle traction on the umbilical cord and uterine massage. The uterus was exteriorized and cleared of all clots and debris. Uterine tone was noted to be good with 20 units of pitocin given through the running IV and uterine massage.  The hysterotomy was closed with a running locked suture of 0 Vicryl.  The hysterotomy was then imbricated using an 0 Monocryl suture. The hysterotomy was noted to be hemostatic. Attention was turned to the right fallopian tube. Which was grasped with a Evelia clamp. The mesosalpinx was serially cauterized and ligated with the Ligasure. The fallopian tube was then amputated at the cornua and handed off for pathology. The same was repeated on the left side. The posterior cul-de-sac was and cleared of all clots and debris. The uterus was returned to the abdomen. The pericolic gutters were and cleared of all clots and debris. The hysterotomy was reexamined and noted to be  hemostatic.  The fascia and rectus muscles were examined and areas of oozing were controlled with electrocautery. The fascia was closed with a running 0 Vicryl suture. The subcutaneous tissue was irrigated and areas of oozing were controlled with electrocautery. The subcutaneous tissue was greater than 2 cm in thickness, and was therefore closed. The skin was closed with 4-0 Vicryl and covered with a sterile dressing.    All sponge, needle, and instrument counts were correct. The patient tolerated the procedure well, and was transferred to recovery in stable condition. Dr. Phillips was present and scrubbed for the entirety of the procedure.     Adia Weber MD  OB/GYN PGY-2  01/08/20 9:59 AM   nAa Phillips MD

## 2020-01-08 NOTE — H&P
[facility]  OB History and Physical      Ameena Cardoza MRN# [id number]   Age: 29 year old YOB: 1990     CC:  Scheduled     HPI:  Ms. Ameena Cardoza is a 29 year old  at 39w1d by 5w6d US who presents for scheduled repeat  section.  She reports contractions, denies vaginal bleeding, and loss of fluid.   + normal fetal movement.    Pregnancy Complications:  -Polyhydramnios, resolved  -Pre-E with severe features/HELLP syndrome in prior pregnancy  -Hx CS x1 after undergoing IOL for the above  -UTI, s/p Keflex and negative KEREN  -Rubella equivocal  -Desires permanent sterilization      Prenatal Labs:   Lab Results   Component Value Date    ABO A 2020    RH Pos 2020    AS Neg 2020    HEPBANG Nonreactive 2019    CHPCRT Negative 2019    GCPCRT Negative 2019    HGB 12.1 2020       GBS Status:   Lab Results   Component Value Date    GBS Negative 2020       Ultrasounds  Dating US 19, 5w6d    OB History  OB History    Para Term  AB Living   2 1 0 1 0 1   SAB TAB Ectopic Multiple Live Births   0 0 0 0 1      # Outcome Date GA Lbr Jose Miguel/2nd Weight Sex Delivery Anes PTL Lv   2 Current            1  16 30w6d  1.162 kg (2 lb 9 oz) F CS-LTranv Spinal Y CHARIS      Birth Comments: PreEsevere,[place]      Complications: Preeclampsia/Hypertension, HELLP (hemolytic anemia/elev liver enzymes/low platelets in pregnancy)      Name: Yolanda       Obstetric Comments   PreE dx 30 wk, inpatient, BMZ, IOL, mag sulfate, C/S,7 weeks in NICU        PMHx:   Past Medical History:   Diagnosis Date     Family history of breast cancer gene mutation in first degree relative     gene tested CHek 2 associated with increased risk  q6 mos mammo starting at 33      HELLP (hemolytic anemia/elev liver enzymes/low platelets in pregnancy)      PCOS (polycystic ovarian syndrome)     age 12 no treatment necessary   "    Preeclampsia, severe, third trimester 2016    HTN, proteinuria, 30wks-->mag, IOL x 24 hrs->C/S     PSHx:   Past Surgical History:   Procedure Laterality Date      SECTION      30 wks gestation, HTN, induction->C/S,      Meds:   Medications Prior to Admission   Medication Sig Dispense Refill Last Dose     Fish Oil-Cholecalciferol (FISH OIL + D3 PO)    2020 at Unknown time     FOLIC ACID PO Take 1 mg by mouth daily   2020 at Unknown time     vitamin B complex with vitamin C (VITAMIN  B COMPLEX) tablet Take 1 tablet by mouth as needed (for fatigue)   2020 at Unknown time     acetaminophen (TYLENOL) 325 MG tablet Take 325-650 mg by mouth every 6 hours as needed for mild pain   Taking     aspirin (ASA) 81 MG tablet Take 1 tablet (81 mg) by mouth daily 100 tablet 3 2020     metoclopramide (REGLAN) 10 MG tablet Take 1 tablet (10 mg) by mouth 4 times daily as needed (nausea, headache) 10 tablet 0 More than a month at Unknown time     Allergies:    Allergies   Allergen Reactions     Sulfa Drugs GI Disturbance      FmHx:   Family History   Problem Relation Age of Onset     Breast Cancer Paternal Grandmother         age 40-x 2 bouts     Hypertension Father 45     Breast Cancer Maternal Aunt         40     Breast Cancer Maternal Aunt         40     Diabetes Paternal Grandfather      SocHx: She denies any tobacco, alcohol, or other drug use during this pregnancy.    ROS:   Complete 10-point ROS negative except as noted in HPI. She denies headache, blurry vision, chest pain, shortness of breath, RUQ pain, nausea, vomiting, dysuria, hematuria or extremity edema.    PE:  Vit:   Patient Vitals for the past 4 hrs:   BP Heart Rate Resp Height Weight   20 0700 124/67 84 18 1.651 m (5' 5\") 83 kg (183 lb)      Gen: Well-appearing, NAD, comfortable   CV: rrr, no mrg   Pulm: Ctab, no wheezes or crackles   Abd: Soft, gravid, non-tender  Ext: trace LE edema b/l    Pres:  ceph by BSUS  EFW:  3700g by " recent growth US  Memb: intact              FHT: Baseline 130, mod variability, + accelerations, no decelerations   [place]: 2-3 contractions in 10 minutes      Assessment  Ms. Ameena Cardoza is a 29 year old , at 39w1d by 5w6d US who presents for scheduled repeat .    # Scheduled repeat  Section  Indicated due to elective repeat. Prior (s) for HELLP syndrome. Will plan for a Pfannenstiel skin incision with low transverse hysterotomy  - Labs: CBC, T&S, RPR   - Pre-op Hgb and Plts pending  - Placenta: anterior  - Anesthesia: Spinal   - 2g Ancef   - PPH Meds/Ppx: routine  - Diet: NPO  - PPx: SCDs  - Consent: Discussed risks and benefits of procedure, including but not limited to bleeding, infection, injury to surrounding organs, injury to infant, and the potential need for another surgery should some injury go unrecognized or patient were to have continued bleeding. Patient had time to ask questions and expressed understanding of procedure and associated risks. Agreed to blood transfusion if necessary. Consent signed. Patient additionally consented for bilateral salpingectomy for sterilization.    # h/o Pre-eclampsia/HELLP syndrome  - Serial BP monitoring   - IV Antihypertensives prn for sustained severe range blood pressures (>160/>110)  - Labs: HELLP labs on admission    # PNC  - Rh pos, Rubella equivocal, give MMR PP, GCT refused, instead drank apple juice with normal subsequent BG, GBS neg  - Other prenatal labs wnl  - s/p flu, Tdap vaccines  - Contraception: BTL  - Feeding: breast     # FWB:   Cat I tracing, reactive; ceph by BSUS; EFW 3700g  - Continuous Fetal Monitoring    The patient was discussed with Dr. Phillips who is in agreement with the treatment plan.    Adia Weber MD  OB/GYN PGY-2  20 7:54 AM   I saw and evaluated the patient. I agree with the findings and the plan of care as documented in the resident's note. Pt is clear in her decision to have bilateral  salpingectomy. MD Precious

## 2020-01-08 NOTE — ANESTHESIA PREPROCEDURE EVALUATION
Anesthesia Pre-Procedure Evaluation    Patient: Ameena Cardoza   MRN:     [id number] Gender:   female   Age:    29 year old :      1990        Preoperative Diagnosis: Previous  delivery, antepartum condition or complication [O34.219]   Procedure(s):   SECTION, WITH POSTPARTUM TUBAL LIGATION     Past Medical History:   Diagnosis Date     Family history of breast cancer gene mutation in first degree relative     gene tested CHek 2 associated with increased risk  q6 mos mammo starting at 33      HELLP (hemolytic anemia/elev liver enzymes/low platelets in pregnancy)      PCOS (polycystic ovarian syndrome)     age 12 no treatment necessary      Preeclampsia, severe, third trimester     HTN, proteinuria, 30wks-->mag, IOL x 24 hrs->C/S      Past Surgical History:   Procedure Laterality Date      SECTION      30 wks gestation, HTN, induction->C/S,           Anesthesia Evaluation     . Pt has had prior anesthetic.            ROS/MED HX    ENT/Pulmonary:  - neg pulmonary ROS     Neurologic:  - neg neurologic ROS     Cardiovascular:         METS/Exercise Tolerance:     Hematologic:  - neg hematologic  ROS       Musculoskeletal:  - neg musculoskeletal ROS       GI/Hepatic:  - neg GI/hepatic ROS       Renal/Genitourinary:  - ROS Renal section negative    (-) renal disease   Endo:  - neg endo ROS       Psychiatric:         Infectious Disease:  - neg infectious disease ROS       Malignancy:      - no malignancy   Other: Comment:  30 yo  at 38+6 wks, h/o Prior c/s and planned repeat with PPTL . Pre-E with severe features/HELLP syndrome in prior pregnancy.  Currently, has proteinuria, reported elevated BP readings at home. Not meeting criteria for gestational hypertension. HELLP labs wnl, UPC 0.82                    JZG FV AN PHYSICAL EXAM    LABS:  CBC:   Lab Results   Component Value Date    WBC 6.9 2020    WBC 7.2 2020    HGB 12.1 2020    HGB 11.5 (L)  "01/02/2020    HCT 37.6 01/06/2020    HCT 35.7 01/02/2020     01/06/2020     01/02/2020     BMP:   Lab Results   Component Value Date    CR 0.66 01/02/2020    CR 0.54 12/27/2019     COAGS: No results found for: PTT, INR, FIBR  POC: No results found for: BGM, HCG, HCGS  OTHER:   Lab Results   Component Value Date    ALT 16 01/02/2020    AST 15 01/02/2020        Preop Vitals    BP Readings from Last 3 Encounters:   01/06/20 119/77   01/02/20 126/72   12/27/19 134/83    Pulse Readings from Last 3 Encounters:   01/06/20 73   01/02/20 58   12/27/19 74      Resp Readings from Last 3 Encounters:   No data found for Resp    SpO2 Readings from Last 3 Encounters:   No data found for SpO2      Temp Readings from Last 1 Encounters:   01/02/20 37  C (98.6  F) (Oral)    Ht Readings from Last 1 Encounters:   01/06/20 1.651 m (5' 5\")      Wt Readings from Last 1 Encounters:   01/06/20 83.2 kg (183 lb 8 oz)    Estimated body mass index is 30.54 kg/m  as calculated from the following:    Height as of 1/6/20: 1.651 m (5' 5\").    Weight as of 1/6/20: 83.2 kg (183 lb 8 oz).     LDA:        Assessment:   ASA SCORE: 2            Plan:   Anes. Type:  Spinal; Peripheral Nerve Block; For Post-op pain in coordination with surgeon     Block Details: TAP; Exparel; Single Shot   Pre-Medication: None   Induction:  N/a   Airway: Native Airway   Access/Monitoring: PIV   Maintenance: N/a     Postop Plan:   Postop Pain: Regional  Postop Sedation/Airway: Not planned  Disposition: Inpatient/Admit     PONV Management: Adult Risk Factors: Female                   Jhonny Clayton MD  "

## 2020-01-09 LAB — HGB BLD-MCNC: 10.5 G/DL (ref 11.7–15.7)

## 2020-01-09 PROCEDURE — 12000001 ZZH R&B MED SURG/OB UMMC

## 2020-01-09 PROCEDURE — 36415 COLL VENOUS BLD VENIPUNCTURE: CPT | Performed by: STUDENT IN AN ORGANIZED HEALTH CARE EDUCATION/TRAINING PROGRAM

## 2020-01-09 PROCEDURE — 85018 HEMOGLOBIN: CPT | Performed by: STUDENT IN AN ORGANIZED HEALTH CARE EDUCATION/TRAINING PROGRAM

## 2020-01-09 PROCEDURE — 25000132 ZZH RX MED GY IP 250 OP 250 PS 637: Performed by: STUDENT IN AN ORGANIZED HEALTH CARE EDUCATION/TRAINING PROGRAM

## 2020-01-09 PROCEDURE — 25000128 H RX IP 250 OP 636: Performed by: STUDENT IN AN ORGANIZED HEALTH CARE EDUCATION/TRAINING PROGRAM

## 2020-01-09 RX ADMIN — SENNOSIDES AND DOCUSATE SODIUM 2 TABLET: 8.6; 5 TABLET ORAL at 19:54

## 2020-01-09 RX ADMIN — KETOROLAC TROMETHAMINE 30 MG: 30 INJECTION, SOLUTION INTRAMUSCULAR at 03:58

## 2020-01-09 RX ADMIN — IBUPROFEN 800 MG: 800 TABLET, FILM COATED ORAL at 22:58

## 2020-01-09 RX ADMIN — SIMETHICONE CHEW TAB 80 MG 80 MG: 80 TABLET ORAL at 18:02

## 2020-01-09 RX ADMIN — ACETAMINOPHEN 975 MG: 325 TABLET, FILM COATED ORAL at 03:57

## 2020-01-09 RX ADMIN — KETOROLAC TROMETHAMINE 30 MG: 30 INJECTION, SOLUTION INTRAMUSCULAR at 09:59

## 2020-01-09 RX ADMIN — IBUPROFEN 800 MG: 800 TABLET, FILM COATED ORAL at 15:47

## 2020-01-09 RX ADMIN — SENNOSIDES AND DOCUSATE SODIUM 2 TABLET: 8.6; 5 TABLET ORAL at 08:47

## 2020-01-09 RX ADMIN — ACETAMINOPHEN 975 MG: 325 TABLET, FILM COATED ORAL at 12:03

## 2020-01-09 RX ADMIN — ACETAMINOPHEN 975 MG: 325 TABLET, FILM COATED ORAL at 19:54

## 2020-01-09 NOTE — PLAN OF CARE
VSS, pt stable overnight, all postpartum assessments WDL. Dressing clean, dry, intact over incision. Segovia discontinued at 0145, pt ambulated to bathroom independently and able to void without difficulty. Pt IV saline locked now. Pain well controlled with IV Toradol and PO tylenol. Pt tolerating regular diet. Pt breastfeeding independently. Showing appropriate bonding with infant and attachment behaviors observed. Continue with current plan of care.

## 2020-01-09 NOTE — PROGRESS NOTES
"Post  Anesthesia Follow Up Note    Patient: Ameena Cardoza    Patient location: Postpartum floor.    Chief complaint: Acute postoperative pain management s/p intrathecal Bupivacaine+ fentanyl + morphine administration     Procedure(s) Performed:  Procedure(s):   SECTION, WITH POSTPARTUM TUBAL LIGATION    Anesthesia type: Spinal Block and + TAP block    Subjective:     Pain Control: 2-3/10    Additional ROS:  She does not complain of pruritis at this time. She denies weakness, denies paresthesia, denies difficulties breathing or voiding, denies nausea or vomiting. She is able to ambulate and tolerates regular diet.    Objective:    Respiratory Function (RR / SpO2 / Airway Patency): Satisfactory    Cardiac Function (HR / Rhythm / BP): Satisfactory    Strength and sensation lower extremities: Normal    Site of spinal/epidural insertion: No signs of infection or inflammation.     Last Vitals: /74   Pulse 65   Temp 36.8  C (98.3  F) (Oral)   Resp 16   Ht 1.651 m (5' 5\")   Wt 83 kg (183 lb)   LMP 2019 (Approximate)   SpO2 97%   Breastfeeding Unknown   BMI 30.45 kg/m      Assessment and plan:   Ameena Cardoza is a 29 year old female  POD #2 s/p   SECTION, WITH POSTPARTUM TUBAL LIGATION with IT 0.75%bupivacaine (1.6ml), fentanyl (15mcg) and morphine (0.1mg); and single shot TAP nerve block injections with 20 mL bupivacaine 0.25% , then 20mL liposome bupivacaine (Exparel) long-acting 1.3% given in the PACU for postoperative analgesia.   Pt is ambulating without difficulty, no weakness or paresthesias.  There is no evidence of adverse side effects associated with spinal and nerve block injections.  The patient is receiving adequate incisional pain control at this time and anticipate up to 72 hours of incisional pain control.  However, we further anticipate that the patient will require opioid/nonopioid analgesics for visceral and muscle pain that is not controlled " with local anesthetic.      In brief summary, her post-operative analgesia is  adequate today. Further interventional analgesic strategies would be of little utility at this time. Thus, we recommend proceeding with PO analgesics including staggered dosing of NSAIDs (ibuprofen) and acetaminophen, with a taper of oxycodone.     Thank you for including us in the care for this patient.    Jhonny Clayton MD

## 2020-01-09 NOTE — PLAN OF CARE
VSS. Fundus Midline, firm, and U/1. Light lochia. Incision covered, dressing C/D/I. Pain adequately managed with tylenol and toradol. Ambulating independently, tolerating regular diet, and voiding without difficulty. Breastfeeding independently with good latch. Bonding well with . Continue with plan of care.

## 2020-01-09 NOTE — PROGRESS NOTES
"[facility], [place]   Post-partum Note    Name:  Ameena Cardoza  MRN: [id number]    S: Patient doing well.  Pain well controlled.  Tolerating regular diet without nausea or vomiting.  Ambulating without dizziness.  Lochia moderate.  Breast feeding.    O:   Patient Vitals for the past 24 hrs:   BP Temp Temp src Pulse Heart Rate Resp SpO2 Height Weight   20 0515 119/74 97.4  F (36.3  C) Oral -- 59 18 98 % -- --   20 0026 114/85 97.6  F (36.4  C) Oral -- 73 18 98 % -- --   20 1645 137/85 97.8  F (36.6  C) Oral 62 -- 16 98 % -- --   20 1540 (!) 144/85 -- -- 55 -- 16 98 % -- --   20 1535 -- 97.4  F (36.3  C) Oral 55 -- 18 97 % -- --   20 1430 129/75 97.7  F (36.5  C) Oral 60 -- 16 99 % -- --   20 1320 126/77 97.4  F (36.3  C) Oral 69 -- 16 99 % -- --   20 1230 (!) 132/94 97.4  F (36.3  C) Oral 60 -- 18 99 % -- --   20 1200 134/86 97.9  F (36.6  C) Oral 64 -- 18 99 % -- --   20 1145 136/81 -- -- -- -- -- 99 % -- --   20 1130 124/80 -- -- -- 60 27 97 % -- --   20 1115 139/84 -- -- -- 50 11 99 % -- --   20 1100 127/79 97.2  F (36.2  C) Oral -- 53 17 100 % -- --   20 1045 117/65 -- -- -- 50 16 98 % -- --   20 1030 130/68 -- -- -- 52 16 98 % -- --   20 1020 120/72 97.6  F (36.4  C) Oral 53 66 17 98 % -- --   20 0700 124/67 -- -- -- 84 18 -- 1.651 m (5' 5\") 83 kg (183 lb)     Gen:  Resting comfortably, NAD  CV:  RRR, no murmur  Pulm:  CTAB, no wheezes  Abd:  Soft, appropriately ttp, non-distended.Fundus at umbilicus, firm and non-tender. Incision c/d/i with bandage in place.   Ext:  non-tender, trace LE edema b/l    I/O last 3 completed shifts:  In: 3979.16 [P.O.:1900; I.V.:2079.16]  Out: 1502 [Urine:1100; Blood:402]    Hgb:   Hemoglobin   Date Value Ref Range Status   2020 12.1 11.7 - 15.7 g/dL Final       Assessment/Plan:  29 year old  on PPD #1 s/p RTLCS and bilateral tubal ligation.  - " continue with routine postpartum management  Pain: Well-controlled with Toradol and tylenol, transition to ibuprofen today. Oxycodone prn.   Hgb: 12.1> , AM pending  Rh: pos  Rubella: equivocal, MMR pp  Feed: breast  BC: S/p BTL  Dispo: DC POD#2-3    #Elevated BP  - Mild range blood pressures <4h apart  - No s/sx preE  - Nl HELLP labs on 1/2  - h/o preE/HELLP syndrome in previous pregnancy  - will continue to monitor and repeat HELLP labs prn    Adia Weber MD  Ob/Gyn, PGY-2  1/9/2020, 6:30 AM    I have seen and examined the patient without the resident. I have reviewed, edited, and agree with the note.   My findings are: My findings are: sleeping soundly  Incision CDI   LE without significant edema or erythema bilaterally  Hemoglobin   Date Value Ref Range Status   01/09/2020 10.5 (L) 11.7 - 15.7 g/dL Final   01/06/2020 12.1 11.7 - 15.7 g/dL Final     Lakshmi Maldonado MD

## 2020-01-09 NOTE — PLAN OF CARE
Vitals stable. Breast feeding infant independently. Segovia in place and patent with clear urine output. Pain managed with Toradol and Tylenol. Was nauseous, refused Zofran previously. Accepted Zofran this time and pt verbalized feeling better. Pt has not gotten out of bed but is planning on getting up by the end of this shift. Pt is able to tolerate snacks now. Will continue to monitor and plan of care.  1025: pt was able to ambulate to bathroom. Felt light headed at first but was ok.

## 2020-01-10 VITALS
HEIGHT: 65 IN | HEART RATE: 60 BPM | OXYGEN SATURATION: 97 % | DIASTOLIC BLOOD PRESSURE: 83 MMHG | TEMPERATURE: 97.9 F | RESPIRATION RATE: 17 BRPM | WEIGHT: 173.4 LBS | BODY MASS INDEX: 28.89 KG/M2 | SYSTOLIC BLOOD PRESSURE: 125 MMHG

## 2020-01-10 PROCEDURE — 90707 MMR VACCINE SC: CPT | Performed by: STUDENT IN AN ORGANIZED HEALTH CARE EDUCATION/TRAINING PROGRAM

## 2020-01-10 PROCEDURE — 25000128 H RX IP 250 OP 636: Performed by: STUDENT IN AN ORGANIZED HEALTH CARE EDUCATION/TRAINING PROGRAM

## 2020-01-10 PROCEDURE — 25000132 ZZH RX MED GY IP 250 OP 250 PS 637: Performed by: STUDENT IN AN ORGANIZED HEALTH CARE EDUCATION/TRAINING PROGRAM

## 2020-01-10 RX ORDER — AMOXICILLIN 250 MG
1 CAPSULE ORAL DAILY
Qty: 100 TABLET | Refills: 0 | Status: SHIPPED | OUTPATIENT
Start: 2020-01-10 | End: 2020-02-20

## 2020-01-10 RX ORDER — IBUPROFEN 600 MG/1
600 TABLET, FILM COATED ORAL EVERY 6 HOURS PRN
Qty: 60 TABLET | Refills: 0 | Status: SHIPPED | OUTPATIENT
Start: 2020-01-10 | End: 2021-06-28

## 2020-01-10 RX ORDER — OXYCODONE HYDROCHLORIDE 5 MG/1
5 TABLET ORAL EVERY 6 HOURS PRN
Qty: 5 TABLET | Refills: 0 | Status: SHIPPED | OUTPATIENT
Start: 2020-01-10 | End: 2020-02-20

## 2020-01-10 RX ORDER — ACETAMINOPHEN 325 MG/1
650 TABLET ORAL EVERY 6 HOURS PRN
Qty: 100 TABLET | Refills: 0 | Status: SHIPPED | OUTPATIENT
Start: 2020-01-10 | End: 2020-02-20

## 2020-01-10 RX ADMIN — IBUPROFEN 800 MG: 800 TABLET, FILM COATED ORAL at 04:52

## 2020-01-10 RX ADMIN — ACETAMINOPHEN 975 MG: 325 TABLET, FILM COATED ORAL at 13:07

## 2020-01-10 RX ADMIN — OXYCODONE HYDROCHLORIDE 5 MG: 5 TABLET ORAL at 13:07

## 2020-01-10 RX ADMIN — MEASLES, MUMPS, AND RUBELLA VIRUS VACCINE LIVE 0.5 ML: 1000; 12500; 1000 INJECTION, POWDER, LYOPHILIZED, FOR SUSPENSION SUBCUTANEOUS at 09:33

## 2020-01-10 RX ADMIN — IBUPROFEN 800 MG: 800 TABLET, FILM COATED ORAL at 10:46

## 2020-01-10 RX ADMIN — SENNOSIDES AND DOCUSATE SODIUM 2 TABLET: 8.6; 5 TABLET ORAL at 07:51

## 2020-01-10 RX ADMIN — ACETAMINOPHEN 975 MG: 325 TABLET, FILM COATED ORAL at 04:52

## 2020-01-10 RX ADMIN — OXYCODONE HYDROCHLORIDE 5 MG: 5 TABLET ORAL at 09:06

## 2020-01-10 ASSESSMENT — MIFFLIN-ST. JEOR: SCORE: 1502.42

## 2020-01-10 NOTE — PROGRESS NOTES
[facility]   Post-partum Note    Name:  Ameena Cardoza  MRN: [id number]    S: Patient doing well.  Pain well controlled.  Tolerating regular diet without nausea or vomiting.  Ambulating without dizziness.  Lochia moderate.  Breast feeding. Voiding without issue. Has not had BM but is passing flatus.     O:   Patient Vitals for the past 24 hrs:   BP Temp Temp src Pulse Resp SpO2   01/10/20 0117 126/80 97.9  F (36.6  C) Oral 60 16 --   20 1548 115/72 97.7  F (36.5  C) Oral 66 14 --   20 0843 124/74 98.3  F (36.8  C) Oral 65 16 97 %     Gen:  Resting comfortably, NAD  CV:  RRR, no murmur  Pulm:  CTAB, no wheezes  Abd:  Soft, appropriately ttp, non-distended. Fundus at umbilicus, firm and non-tender. Incision c/d/i.    Ext:  non-tender, trace LE edema b/l    I/O last 3 completed shifts:  In: 535.42 [I.V.:535.42]  Out: 1475 [Urine:1475]    Hgb:   Hemoglobin   Date Value Ref Range Status   2020 10.5 (L) 11.7 - 15.7 g/dL Final       Assessment/Plan:  29 year old  on PPD #2 s/p RTLCS and bilateral tubal ligation.  - continue with routine postpartum management  Pain: Well-controlled with ibuprofen and tylenol. Oxycodone prn.   Hgb: 12.1> >10.5, asymptomatic   Rh: pos  Rubella: equivocal, MMR pp  Feed: breast  BC: S/p BTL  Dispo: Likely later today    #Elevated BP  - Mild range blood pressures <4h apart  - No s/sx preE  - Nl HELLP labs on /2  - h/o preE/HELLP syndrome in previous pregnancy  - will continue to monitor and repeat HELLP labs prn    Adia Weber MD  OB/GYN PGY-2  01/10/20 6:26 AM

## 2020-01-10 NOTE — PLAN OF CARE
Data: Vital signs within normal limits. Postpartum checks within normal limits - see flow record. Patient eating and drinking normally. Patient able to empty bladder independently and is up ambulating. No apparent signs of infection. C/S dressing clean, dry, and intact. Patient reports she might take a shower later, discussed with patient that she can remove her C/S bandage when she showers. Patient performing self cares and is able to care for infant. Breastfeeding and hand expressing on demand.   Action: Pain has been adequately managed with oral medications. Patient reporting gas pain this evening, Simethicone given to help with gas relief.   Response: Positive attachment behaviors observed with infant. Support person, : Red, present.   Plan: Continue with the plan of care.

## 2020-01-10 NOTE — PLAN OF CARE
VSS and postpartum assessment WDL. Incision edges approximated with steri-strips, no redness, swelling, or drainage. Pain adequately managed with tylenol, ibuprofen, and PRN oxycodone. Voiding without difficulty, tolerating regular diet, and ambulating independently. Breastfeeding well with good latch. Bonding well with . Discharge goals of care plan met. Follow-up instructions reviewed, discharge education completed and questions answered, patient verbalizes understanding. Home medications given and instructions reviewed. Discharged to home with baby in car seat.

## 2020-01-10 NOTE — PLAN OF CARE
VSS. Breastfeeding independently. Up ad everett. Voiding adequately and tolerating regular diet. Dressing removed from incision per pt request - intact with steri strips with no signs of infection. Pain controlled with ibuprofen and tylenol. Passing gas but no BM. Bonding well with baby. Continue current plan of care.

## 2020-01-10 NOTE — LACTATION NOTE
This note was copied from a baby's chart.  Consult for: Second child breastfeeding, first was  with a feeding tube for first month.    History:   delivery @ 39w1d, AGA infant @ 8# 3.6 oz. birthweight, 5.1% loss at 24 and -7.1% from birthweight @ 48 hours with low risk serum bilirubin.  Maternal history of vitamin D deficiency, severe preeclampsia with first baby, polyhydramnios with this one, PCOS with very irregular cycles but both pregnancies spontaneous. She reports good milk supply with her first child but initiated supply strictly with pumping. She stopped at 4 months due to nipple bleeding and pain, feels it was from her pump.     Breast exam of mom: Soft, symmetric with intact but sore & reddened, everted nipples bilaterally.    Oral exam of baby: Not done, feeding during entire visit.     Feeding assessment:  Infant latched with several nutritive sucks but slightly shallow. With permission, demo ways to get on deeper and had mom return demo. She reports immediate relief, able to see and feel difference.     Education provided: Discussed positioning & using pillows/blankets for support, anatomy of breast and infant mouth for feedings, ways to get and maintain deeper latch, breast compressions to enhance milk transfer prn, point out nutritive vs. non-nutritive suck and how to hear swallows, benefits of skin to skin and feeding on cue, supply and demand, benefits of breast massage & hand expression. Verbally reviewed breastfeeding log with resource list (already packed in car), mom familiar with [email or web address] but oriented to [facility] in [facility]. Reviewed how to fit pump flanges, she is switching from Medela to Spectra this time. Encouraged to bring pump to lactation visit for check of fit. Recommend using lower suction pressures, just have at highest comfortable level to avoid inflammation and damage to nipples. Discussed nipple care and encouraged family support to maximize self  care and rest at home. Offer support and encouragement, answered questions.     Plan: Continued breastfeeding on cue with deeper latch, goal of 8 to 12 feedings in 24 hours. Recommended hand expressing after most feedings until milk is in, feed back results, and follow up with outpatient lactation consultant within a week of discharge due to nipple pain.

## 2020-01-15 LAB — COPATH REPORT: NORMAL

## 2020-01-16 ENCOUNTER — TELEPHONE (OUTPATIENT)
Dept: OBGYN | Facility: CLINIC | Age: 30
End: 2020-01-16

## 2020-01-16 ENCOUNTER — HOSPITAL ENCOUNTER (EMERGENCY)
Facility: CLINIC | Age: 30
Discharge: HOME | End: 2020-01-16
Attending: INTERNAL MEDICINE | Admitting: INTERNAL MEDICINE
Payer: COMMERCIAL

## 2020-01-16 VITALS
WEIGHT: 164.3 LBS | SYSTOLIC BLOOD PRESSURE: 134 MMHG | TEMPERATURE: 97.8 F | HEART RATE: 79 BPM | BODY MASS INDEX: 27.34 KG/M2 | DIASTOLIC BLOOD PRESSURE: 91 MMHG | RESPIRATION RATE: 16 BRPM | OXYGEN SATURATION: 97 %

## 2020-01-16 DIAGNOSIS — R03.0 ELEVATED BP WITHOUT DIAGNOSIS OF HYPERTENSION: ICD-10-CM

## 2020-01-16 LAB
ALBUMIN SERPL-MCNC: 3.1 G/DL (ref 3.4–5)
ALBUMIN UR-MCNC: NEGATIVE MG/DL
ALP SERPL-CCNC: 87 U/L (ref 40–150)
ALT SERPL W P-5'-P-CCNC: 21 U/L (ref 0–50)
ANION GAP SERPL CALCULATED.3IONS-SCNC: 7 MMOL/L (ref 3–14)
APPEARANCE UR: CLEAR
AST SERPL W P-5'-P-CCNC: 14 U/L (ref 0–45)
BACTERIA #/AREA URNS HPF: ABNORMAL /HPF
BASOPHILS # BLD AUTO: 0 10E9/L (ref 0–0.2)
BASOPHILS NFR BLD AUTO: 0.2 %
BILIRUB SERPL-MCNC: 0.2 MG/DL (ref 0.2–1.3)
BILIRUB UR QL STRIP: NEGATIVE
BUN SERPL-MCNC: 7 MG/DL (ref 7–30)
CALCIUM SERPL-MCNC: 8.4 MG/DL (ref 8.5–10.1)
CHLORIDE SERPL-SCNC: 110 MMOL/L (ref 94–109)
CO2 SERPL-SCNC: 24 MMOL/L (ref 20–32)
COLOR UR AUTO: YELLOW
CREAT SERPL-MCNC: 0.51 MG/DL (ref 0.52–1.04)
CREAT UR-MCNC: 62 MG/DL
DIFFERENTIAL METHOD BLD: ABNORMAL
EOSINOPHIL # BLD AUTO: 0 10E9/L (ref 0–0.7)
EOSINOPHIL NFR BLD AUTO: 0.7 %
ERYTHROCYTE [DISTWIDTH] IN BLOOD BY AUTOMATED COUNT: 13.4 % (ref 10–15)
GFR SERPL CREATININE-BSD FRML MDRD: >90 ML/MIN/{1.73_M2}
GLUCOSE SERPL-MCNC: 85 MG/DL (ref 70–99)
GLUCOSE UR STRIP-MCNC: NEGATIVE MG/DL
HCT VFR BLD AUTO: 35.2 % (ref 35–47)
HGB BLD-MCNC: 11.4 G/DL (ref 11.7–15.7)
HGB UR QL STRIP: ABNORMAL
IMM GRANULOCYTES # BLD: 0 10E9/L (ref 0–0.4)
IMM GRANULOCYTES NFR BLD: 0 %
INR PPP: 0.98 (ref 0.86–1.14)
KETONES UR STRIP-MCNC: 5 MG/DL
LEUKOCYTE ESTERASE UR QL STRIP: ABNORMAL
LYMPHOCYTES # BLD AUTO: 1.5 10E9/L (ref 0.8–5.3)
LYMPHOCYTES NFR BLD AUTO: 25.7 %
MAGNESIUM SERPL-MCNC: 2.2 MG/DL (ref 1.6–2.3)
MCH RBC QN AUTO: 28.8 PG (ref 26.5–33)
MCHC RBC AUTO-ENTMCNC: 32.4 G/DL (ref 31.5–36.5)
MCV RBC AUTO: 89 FL (ref 78–100)
MONOCYTES # BLD AUTO: 0.5 10E9/L (ref 0–1.3)
MONOCYTES NFR BLD AUTO: 9.3 %
NEUTROPHILS # BLD AUTO: 3.7 10E9/L (ref 1.6–8.3)
NEUTROPHILS NFR BLD AUTO: 64.1 %
NITRATE UR QL: NEGATIVE
NRBC # BLD AUTO: 0 10*3/UL
NRBC BLD AUTO-RTO: 0 /100
PH UR STRIP: 6.5 PH (ref 5–7)
PLATELET # BLD AUTO: 409 10E9/L (ref 150–450)
POTASSIUM SERPL-SCNC: 3.8 MMOL/L (ref 3.4–5.3)
PROT SERPL-MCNC: 6.6 G/DL (ref 6.8–8.8)
PROT UR-MCNC: 0.16 G/L
PROT/CREAT 24H UR: 0.26 G/G CR (ref 0–0.2)
RBC # BLD AUTO: 3.96 10E12/L (ref 3.8–5.2)
RBC #/AREA URNS AUTO: 11 /HPF (ref 0–2)
SODIUM SERPL-SCNC: 141 MMOL/L (ref 133–144)
SOURCE: ABNORMAL
SP GR UR STRIP: 1.01 (ref 1–1.03)
SQUAMOUS #/AREA URNS AUTO: 2 /HPF (ref 0–1)
UROBILINOGEN UR STRIP-MCNC: NORMAL MG/DL (ref 0–2)
WBC # BLD AUTO: 5.7 10E9/L (ref 4–11)
WBC #/AREA URNS AUTO: 14 /HPF (ref 0–5)

## 2020-01-16 PROCEDURE — 99283 EMERGENCY DEPT VISIT LOW MDM: CPT | Mod: Z6 | Performed by: INTERNAL MEDICINE

## 2020-01-16 PROCEDURE — 83735 ASSAY OF MAGNESIUM: CPT | Performed by: INTERNAL MEDICINE

## 2020-01-16 PROCEDURE — 84156 ASSAY OF PROTEIN URINE: CPT | Performed by: INTERNAL MEDICINE

## 2020-01-16 PROCEDURE — 85610 PROTHROMBIN TIME: CPT | Performed by: INTERNAL MEDICINE

## 2020-01-16 PROCEDURE — 99283 EMERGENCY DEPT VISIT LOW MDM: CPT | Performed by: INTERNAL MEDICINE

## 2020-01-16 PROCEDURE — 80053 COMPREHEN METABOLIC PANEL: CPT | Performed by: INTERNAL MEDICINE

## 2020-01-16 PROCEDURE — 81001 URINALYSIS AUTO W/SCOPE: CPT | Performed by: INTERNAL MEDICINE

## 2020-01-16 PROCEDURE — 85025 COMPLETE CBC W/AUTO DIFF WBC: CPT | Performed by: INTERNAL MEDICINE

## 2020-01-16 ASSESSMENT — ENCOUNTER SYMPTOMS
CHILLS: 0
SPEECH DIFFICULTY: 0
BACK PAIN: 0
ADENOPATHY: 0
SEIZURES: 0
TROUBLE SWALLOWING: 0
SHORTNESS OF BREATH: 0
APPETITE CHANGE: 0
ABDOMINAL PAIN: 0
VOMITING: 0
HEADACHES: 0
FEVER: 0
WHEEZING: 0
PALPITATIONS: 0
COUGH: 0
LIGHT-HEADEDNESS: 0
NECK PAIN: 0
DYSURIA: 0
NUMBNESS: 0
CONFUSION: 0
WEAKNESS: 0
NAUSEA: 0

## 2020-01-16 NOTE — TELEPHONE ENCOUNTER
"Pt called to report blood pressure elevated at home at 167/101. Pt has hx pre-eclampsia in a prior pregnancy, recently delivered via  on 20.     Denies headache, denies N/V, denies RUQ pain. Endorses one episode of \"seeing stars\" which lasted 3 seconds.     Nurse discussed with Dr. Desai in clinic who advised pt should repeat BP at home and if continues observe elevated readings should present to ED for assessment and management. Advised if BP significantly lower (\"less than 130/80\") with recheck can call back to discuss plan.     Pt expressed understanding and agrees with plan. Will recheck BP and present to ED or call back to discuss      "

## 2020-01-16 NOTE — ED AVS SNAPSHOT
[facility], [place], Emergency Department  [address]  [facility] [address]  Phone:  [phone]  Fax:  [phone]                                    Ameena Cardoza   MRN: [id number]    Department:  [facility], Emergency Department   Date of Visit:  1/16/2020           After Visit Summary Signature Page    I have received my discharge instructions, and my questions have been answered. I have discussed any challenges I see with this plan with the nurse or doctor.    ..........................................................................................................................................  Patient/Patient Representative Signature      ..........................................................................................................................................  Patient Representative Print Name and Relationship to Patient    ..................................................               ................................................  Date                                   Time    ..........................................................................................................................................  Reviewed by Signature/Title    ...................................................              ..............................................  Date                                               Time          22EPIC Rev 08/18

## 2020-01-17 NOTE — CONSULTS
Gynecology Consult Note    Referring Physician: Osorio Valdez MD  Reason for Consult: postpartum hypertension    HPI: Ameena Cardoza is a 29 year old  POD#8 s/p RLTCS and BS who presented to the ED for evaluation of elevated blood pressure.  Pt reports history of HELLP syndrome in prior pregnancy and reports having no symptoms at that time.    Pt reports waking up this afternoon and seeing black spots in her vision for about 3 seconds. She was concerned and check her blood pressure multiple times.  The highest reading was 160s/90s and came in for evaluation.  Here she reports she feels well. Denies headache, further vision changes, chest pain, shortness of breath or upper abdominal pain.  She reports breastfeeding is going well and denies breast pain or redness. Reports vaginal bleeding is now minimal.       OBHx:  - PLTCS at 30w6d, pre-eclampsia/HELLP  G2 - 39w1d RLTCS (on 20)      PMH:   Per chart review  Past Medical History:   Diagnosis Date     Family history of breast cancer gene mutation in first degree relative     gene tested CHek 2 associated with increased risk  q6 mos mammo starting at 33      HELLP (hemolytic anemia/elev liver enzymes/low platelets in pregnancy)      PCOS (polycystic ovarian syndrome)     age 12 no treatment necessary      Preeclampsia, severe, third trimester     HTN, proteinuria, 30wks-->mag, IOL x 24 hrs->C/S       PSH:   Per chart review  Past Surgical History:   Procedure Laterality Date      SECTION      30 wks gestation, HTN, induction->C/S,       SECTION, TUBAL LIGATION, COMBINED N/A 2020    Procedure:  SECTION, WITH POSTPARTUM TUBAL LIGATION;  Surgeon: Ana Phillips MD;  Location:  L+D       Social Hx:   Per chart review  Social History     Tobacco Use     Smoking status: Never Smoker     Smokeless tobacco: Never Used   Substance Use Topics     Alcohol use: Not Currently     Drug use: Not Currently       ROS:   Negative except per HPI    Objective:   BP (!) 134/91   Pulse 79   Temp 97.8  F (36.6  C) (Oral)   Resp 16   Wt 74.5 kg (164 lb 4.8 oz)   LMP 02/04/2019 (Approximate)   SpO2 97%   BMI 27.34 kg/m    Constitutional: Healthy appearing  female, no acute distress  HEENT: Normal appearance.    Cardiovascular: Regular rate and rhythm without murmurs, clicks, gallops or rub  Respiratory: Clear to auscultation bilaterally without crackles or wheeze  Gastrointestinal: Abdomen soft, non-tender in RUQ.   Skin: No suspicious lesions or rashes  Psychiatric: mentation appears normal and affect normal/bright    Labs/Imaging:  Results for orders placed or performed during the hospital encounter of 01/16/20   CBC with platelets differential     Status: Abnormal   Result Value Ref Range    WBC 5.7 4.0 - 11.0 10e9/L    RBC Count 3.96 3.8 - 5.2 10e12/L    Hemoglobin 11.4 (L) 11.7 - 15.7 g/dL    Hematocrit 35.2 35.0 - 47.0 %    MCV 89 78 - 100 fl    MCH 28.8 26.5 - 33.0 pg    MCHC 32.4 31.5 - 36.5 g/dL    RDW 13.4 10.0 - 15.0 %    Platelet Count 409 150 - 450 10e9/L    Diff Method Automated Method     % Neutrophils 64.1 %    % Lymphocytes 25.7 %    % Monocytes 9.3 %    % Eosinophils 0.7 %    % Basophils 0.2 %    % Immature Granulocytes 0.0 %    Nucleated RBCs 0 0 /100    Absolute Neutrophil 3.7 1.6 - 8.3 10e9/L    Absolute Lymphocytes 1.5 0.8 - 5.3 10e9/L    Absolute Monocytes 0.5 0.0 - 1.3 10e9/L    Absolute Eosinophils 0.0 0.0 - 0.7 10e9/L    Absolute Basophils 0.0 0.0 - 0.2 10e9/L    Abs Immature Granulocytes 0.0 0 - 0.4 10e9/L    Absolute Nucleated RBC 0.0    INR     Status: None   Result Value Ref Range    INR 0.98 0.86 - 1.14   Comprehensive metabolic panel     Status: Abnormal   Result Value Ref Range    Sodium 141 133 - 144 mmol/L    Potassium 3.8 3.4 - 5.3 mmol/L    Chloride 110 (H) 94 - 109 mmol/L    Carbon Dioxide 24 20 - 32 mmol/L    Anion Gap 7 3 - 14 mmol/L    Glucose 85 70 - 99 mg/dL    Urea Nitrogen 7 7 -  30 mg/dL    Creatinine 0.51 (L) 0.52 - 1.04 mg/dL    GFR Estimate >90 >60 mL/min/[1.73_m2]    GFR Estimate If Black >90 >60 mL/min/[1.73_m2]    Calcium 8.4 (L) 8.5 - 10.1 mg/dL    Bilirubin Total 0.2 0.2 - 1.3 mg/dL    Albumin 3.1 (L) 3.4 - 5.0 g/dL    Protein Total 6.6 (L) 6.8 - 8.8 g/dL    Alkaline Phosphatase 87 40 - 150 U/L    ALT 21 0 - 50 U/L    AST 14 0 - 45 U/L   Magnesium     Status: None   Result Value Ref Range    Magnesium 2.2 1.6 - 2.3 mg/dL   UA with Microscopic     Status: Abnormal   Result Value Ref Range    Color Urine Yellow     Appearance Urine Clear     Glucose Urine Negative NEG^Negative mg/dL    Bilirubin Urine Negative NEG^Negative    Ketones Urine 5 (A) NEG^Negative mg/dL    Specific Gravity Urine 1.008 1.003 - 1.035    Blood Urine Moderate (A) NEG^Negative    pH Urine 6.5 5.0 - 7.0 pH    Protein Albumin Urine Negative NEG^Negative mg/dL    Urobilinogen mg/dL Normal 0.0 - 2.0 mg/dL    Nitrite Urine Negative NEG^Negative    Leukocyte Esterase Urine Moderate (A) NEG^Negative    Source Midstream Urine     WBC Urine 14 (H) 0 - 5 /HPF    RBC Urine 11 (H) 0 - 2 /HPF    Bacteria Urine Few (A) NEG^Negative /HPF    Squamous Epithelial /HPF Urine 2 (H) 0 - 1 /HPF   Protein  random urine with Creat Ratio     Status: Abnormal   Result Value Ref Range    Protein Random Urine 0.16 g/L    Protein Total Urine g/gr Creatinine 0.26 (H) 0 - 0.2 g/g Cr   Creatinine urine calculation only     Status: None   Result Value Ref Range    Creatinine Urine 62 mg/dL        Assessment/Plan:   Ameena Cardoza is a 29 year old  POD#8 s/p RLTCS and BS with postpartum gestational hypertension.     Gestational hypertension  - mild range blood pressures in the ED  - HELLP labs in the ED are normal, UPC 0.26  - pt will continue to watch bps at home, advised sitting quietly for 15 minutes prior to check  - follow up in clinic Tuesday for bp check and to bring home cuff to appointment to check cuff accuracy  - discussed  she may need to start a bp med but doesn't meet criteria for that quite yet    Discussed with Dr. Desai.    Amy Schumer, MD  Obstetrics and Gynecology, PGY-3  01/16/20 10:29 PM     Patient reviewed with me, assessment and plan jointly made.   Alba Desai MD

## 2020-01-17 NOTE — ED PROVIDER NOTES
History     Chief Complaint   Patient presents with     Hypertension     8 days postpartum; hx of hypertension with other pregnancies but not this one until now; BP at 163/101 and 159/99; reports seeing stars and pressure in ears     HPI  Ameena Cardoza is a 29 year old female who presents with elevated blood pressure at home. She is 8 days post partum from C section delivery at term. She has a history of severe preeclampsia with a pregnancy in 2016. She was treated with magnesium and early delivery. She has never been on medication for hypertension. Blood pressure was OK throughout the current pregnancy. Today she woke from a nap feeling a bit off, with stars in her vision and an ache in her right ear. She took her blood pressure at home and noted it was 168/105. She was advised to come in for evaluation. She has no current headache, visual changes, chest pain, palpitations, nausea, vomiting, abdominal pain or urinary difficulty. She has normal lochia. She feels the incision is healing well. She has no leg pain or swelling.    Past Medical History:   Diagnosis Date     Family history of breast cancer gene mutation in first degree relative     gene tested CHek 2 associated with increased risk  q6 mos mammo starting at 33      HELLP (hemolytic anemia/elev liver enzymes/low platelets in pregnancy)      PCOS (polycystic ovarian syndrome)     age 12 no treatment necessary      Preeclampsia, severe, third trimester     HTN, proteinuria, 30wks-->mag, IOL x 24 hrs->C/S       Past Surgical History:   Procedure Laterality Date      SECTION      30 wks gestation, HTN, induction->C/S,       SECTION, TUBAL LIGATION, COMBINED N/A 2020    Procedure:  SECTION, WITH POSTPARTUM TUBAL LIGATION;  Surgeon: Ana Phillips MD;  Location:  L+D       Family History   Problem Relation Age of Onset     Breast Cancer Paternal Grandmother         age 40-x 2 bouts     Hypertension Father 45      Breast Cancer Maternal Aunt         40     Breast Cancer Maternal Aunt         40     Diabetes Paternal Grandfather        Social History     Tobacco Use     Smoking status: Never Smoker     Smokeless tobacco: Never Used   Substance Use Topics     Alcohol use: Not Currently         I have reviewed the Medications, Allergies, Past Medical and Surgical History, and Social History in the Epic system.    Review of Systems   Constitutional: Negative for appetite change, chills and fever.   HENT: Positive for ear pain. Negative for congestion and trouble swallowing.    Eyes: Positive for visual disturbance.   Respiratory: Negative for cough, shortness of breath and wheezing.    Cardiovascular: Negative for chest pain, palpitations and leg swelling.   Gastrointestinal: Negative for abdominal pain, nausea and vomiting.   Genitourinary: Negative for dysuria.   Musculoskeletal: Negative for back pain and neck pain.   Skin: Negative for rash.   Neurological: Negative for seizures, speech difficulty, weakness, light-headedness, numbness and headaches.   Hematological: Negative for adenopathy.   Psychiatric/Behavioral: Negative for confusion.       Physical Exam   BP: (!) 138/94  Pulse: 85  Temp: 97.8  F (36.6  C)  Resp: 16  Weight: 74.5 kg (164 lb 4.8 oz)  SpO2: 97 %      Physical Exam  Vitals signs and nursing note reviewed.   Constitutional:       Appearance: Normal appearance.   HENT:      Head: Normocephalic and atraumatic.      Right Ear: External ear normal.      Left Ear: External ear normal.      Nose: Nose normal.      Mouth/Throat:      Mouth: Mucous membranes are moist.   Eyes:      General: No scleral icterus.     Extraocular Movements: Extraocular movements intact.      Pupils: Pupils are equal, round, and reactive to light.   Neck:      Musculoskeletal: Normal range of motion and neck supple.   Cardiovascular:      Rate and Rhythm: Normal rate and regular rhythm.      Heart sounds: No murmur.   Pulmonary:       Effort: Pulmonary effort is normal.      Breath sounds: Normal breath sounds. No wheezing or rales.   Abdominal:      General: Abdomen is flat.      Palpations: Abdomen is soft.      Tenderness: There is no abdominal tenderness.   Musculoskeletal:      Right lower leg: No edema.      Left lower leg: No edema.   Lymphadenopathy:      Cervical: No cervical adenopathy.   Skin:     General: Skin is warm and dry.   Neurological:      General: No focal deficit present.      Mental Status: She is alert and oriented to person, place, and time.      Cranial Nerves: No cranial nerve deficit.   Psychiatric:         Mood and Affect: Mood is anxious (mild). Affect is labile.         Behavior: Behavior normal.         Thought Content: Thought content normal.         Judgment: Judgment normal.         ED Course        Procedures        Labs/Imaging    Results for orders placed or performed during the hospital encounter of 01/16/20 (from the past 24 hour(s))   UA with Microscopic   Result Value Ref Range    Color Urine Yellow     Appearance Urine Clear     Glucose Urine Negative NEG^Negative mg/dL    Bilirubin Urine Negative NEG^Negative    Ketones Urine 5 (A) NEG^Negative mg/dL    Specific Gravity Urine 1.008 1.003 - 1.035    Blood Urine Moderate (A) NEG^Negative    pH Urine 6.5 5.0 - 7.0 pH    Protein Albumin Urine Negative NEG^Negative mg/dL    Urobilinogen mg/dL Normal 0.0 - 2.0 mg/dL    Nitrite Urine Negative NEG^Negative    Leukocyte Esterase Urine Moderate (A) NEG^Negative    Source Midstream Urine     WBC Urine 14 (H) 0 - 5 /HPF    RBC Urine 11 (H) 0 - 2 /HPF    Bacteria Urine Few (A) NEG^Negative /HPF    Squamous Epithelial /HPF Urine 2 (H) 0 - 1 /HPF   Protein  random urine with Creat Ratio   Result Value Ref Range    Protein Random Urine 0.16 g/L    Protein Total Urine g/gr Creatinine 0.26 (H) 0 - 0.2 g/g Cr   Creatinine urine calculation only   Result Value Ref Range    Creatinine Urine 62 mg/dL   CBC with platelets  differential   Result Value Ref Range    WBC 5.7 4.0 - 11.0 10e9/L    RBC Count 3.96 3.8 - 5.2 10e12/L    Hemoglobin 11.4 (L) 11.7 - 15.7 g/dL    Hematocrit 35.2 35.0 - 47.0 %    MCV 89 78 - 100 fl    MCH 28.8 26.5 - 33.0 pg    MCHC 32.4 31.5 - 36.5 g/dL    RDW 13.4 10.0 - 15.0 %    Platelet Count 409 150 - 450 10e9/L    Diff Method Automated Method     % Neutrophils 64.1 %    % Lymphocytes 25.7 %    % Monocytes 9.3 %    % Eosinophils 0.7 %    % Basophils 0.2 %    % Immature Granulocytes 0.0 %    Nucleated RBCs 0 0 /100    Absolute Neutrophil 3.7 1.6 - 8.3 10e9/L    Absolute Lymphocytes 1.5 0.8 - 5.3 10e9/L    Absolute Monocytes 0.5 0.0 - 1.3 10e9/L    Absolute Eosinophils 0.0 0.0 - 0.7 10e9/L    Absolute Basophils 0.0 0.0 - 0.2 10e9/L    Abs Immature Granulocytes 0.0 0 - 0.4 10e9/L    Absolute Nucleated RBC 0.0    INR   Result Value Ref Range    INR 0.98 0.86 - 1.14   Comprehensive metabolic panel   Result Value Ref Range    Sodium 141 133 - 144 mmol/L    Potassium 3.8 3.4 - 5.3 mmol/L    Chloride 110 (H) 94 - 109 mmol/L    Carbon Dioxide 24 20 - 32 mmol/L    Anion Gap 7 3 - 14 mmol/L    Glucose 85 70 - 99 mg/dL    Urea Nitrogen 7 7 - 30 mg/dL    Creatinine 0.51 (L) 0.52 - 1.04 mg/dL    GFR Estimate >90 >60 mL/min/[1.73_m2]    GFR Estimate If Black >90 >60 mL/min/[1.73_m2]    Calcium 8.4 (L) 8.5 - 10.1 mg/dL    Bilirubin Total 0.2 0.2 - 1.3 mg/dL    Albumin 3.1 (L) 3.4 - 5.0 g/dL    Protein Total 6.6 (L) 6.8 - 8.8 g/dL    Alkaline Phosphatase 87 40 - 150 U/L    ALT 21 0 - 50 U/L    AST 14 0 - 45 U/L   Magnesium   Result Value Ref Range    Magnesium 2.2 1.6 - 2.3 mg/dL         Assessments & Plan (with Medical Decision Making)   Impression:  Young woman with a history of past severe preeclampsia presents with a single elevated blood pressure at home and minimally elevated blood pressure in the ED this evening. She had some vague transient visual scotomata on awakening from a nap earlier. In the ED blood pressure has  come down to the non treatment range. Labs are reassuring, with no evidence of preeclampsia, reassuring urine proteiin, LFTs creatinine and electrolytes. She was seen in consultation by Ob/Gyn on call. Ob does not feel any blood pressure treatment is at this point indicated and recommend following blood pressure closely at home and early follow up in clinic.    I have reviewed the nursing notes.    I have reviewed the findings, diagnosis, plan and need for follow up with the patient.    New Prescriptions    No medications on file       Final diagnoses:   Elevated BP without diagnosis of hypertension   Postpartum state       1/16/2020   [facility], [place], EMERGENCY DEPARTMENT     Osorio Valdez MD  01/16/20 1951

## 2020-01-17 NOTE — DISCHARGE INSTRUCTIONS
Follow up with OB/Gyn clinic tomorrow.  Check blood pressure twice/ day at home.  Return for any new or worsening symptoms.

## 2020-01-19 ENCOUNTER — TELEPHONE (OUTPATIENT)
Dept: OBGYN | Facility: CLINIC | Age: 30
End: 2020-01-19

## 2020-01-19 NOTE — TELEPHONE ENCOUNTER
Returned Marcia's call. 11d PP, h/o HELLP, newly diagnosed gestational hypertension on 2020. Monitoring BP at home, a bit more elevated than yesterday, Bps 150s/100s, and just had a 160/100. Mild headache, feels like pressure, thinks its from taking a long drive in the car and seeing lots of bright light from the sun reflecting off of the snow. No vision changes or RUQ pain. No fever.    I recommended that Marcia return to the ED for evaluation as I am concerned she is developing pre-eclampsia. Marcia strongly prefers to remain at home as she is worried she will be exposed to the flu and she has a  at home. I Offered her as a second line to continue to monitor BP over next hour, to take tylenol or ibuprofen for headache, and to come to ED if BP remains >160/100 or headache persists. She expressed understanding. If does not come to ED tonight, has BP check in [facility] clinic tomorrow morning at 10:00 AM.    Ethel Corey MD, MSCI    [facility]/OBGYN

## 2020-01-20 ENCOUNTER — ALLIED HEALTH/NURSE VISIT (OUTPATIENT)
Dept: OBGYN | Facility: CLINIC | Age: 30
End: 2020-01-20
Payer: COMMERCIAL

## 2020-01-20 ENCOUNTER — OFFICE VISIT (OUTPATIENT)
Dept: PSYCHOLOGY | Facility: CLINIC | Age: 30
End: 2020-01-20
Attending: OBSTETRICS & GYNECOLOGY
Payer: COMMERCIAL

## 2020-01-20 VITALS — SYSTOLIC BLOOD PRESSURE: 127 MMHG | DIASTOLIC BLOOD PRESSURE: 85 MMHG

## 2020-01-20 DIAGNOSIS — F41.9 ANXIETY DISORDER, UNSPECIFIED TYPE: Primary | ICD-10-CM

## 2020-01-20 DIAGNOSIS — F32.A DEPRESSION, UNSPECIFIED DEPRESSION TYPE: ICD-10-CM

## 2020-01-20 PROCEDURE — G0463 HOSPITAL OUTPT CLINIC VISIT: HCPCS | Mod: ZF

## 2020-01-20 NOTE — NURSING NOTE
Pt in for post partum BP check. Average 127/87. Home BP cuff reading 140s/90s in clinic today. MD recommends recheck in one week and have home cuff calibrated by pharmacy. Informed patient who agrees with plan.     Endorses anxiety and is tearful-- states this delivery was less traumatic but is feeling overwhelmed with a toddler and . Open to referral to therapy but does not desire medication at this time. Referred to Katia Huntley and pt will see her today.

## 2020-01-20 NOTE — NURSING NOTE
Average blood pressure is 127 87   Talked to patient will have RN consult with oncall MD to come up with a plan.

## 2020-01-21 NOTE — PROGRESS NOTES
"  Name:  Ameena \"Joseline Cardoza  Mrn: [id number]  Date of visit: 2020    OUTPATIENT PSYCHOLOGICAL ASSESSMENT VISIT      REFERRAL SOURCE:   Triage Nurse, [facility]      Limits of confidentiality    Risks and benefits of treatment    The goals and procedures of the visit    IDENTIFYING INFORMATION: Marcia Cardoza is a 30yo  woman presenting in the postpartum period with sadness and anxiety, ( 18)     History of Present Illness: Ms. Cardoza described worry around  how to raise 2 kids and be alone with them for 4mos? I m not prepared.  She explained that she did not have these anxieties with her first child, but the idea of  being cooped up  and having to  entertain  her 4yo while caring for her infant is difficult.  She wondered why she is having these emotions as  I should be set up for success  with family and  support, leave time, and stability in her life,  I have unjustified sadness.   However, she is crying multiple times per day and worried that she is not a good mom to her 4yo and that she does not have any time for self care.  She has never had any mental health problems and she cannot find reasons for these emotional symptoms.  In the past she has  pulled myself up by my bootstraps  and been able to balance work and other aspects of her life. Now she has worries that she will not be able to find this balance.    Social History:  in 2017.  Daughter 4yo and  son.  Both her  and dad (lives 1 hr away) have flexible jobs and can come to her when she asks.  The family lives in their own home.   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  Successful and enjoys her job.      Abuse and Trauma History:  Time did not permit for review.        Family Mental Health History:  Time did not permit for review.        Mental Health History:  Denied history of psychotherapy or psychopharmacotherapy.      Current Psychotropic " Medications: None     Substance Use History:    Alcohol -  None, before pregnancy 1serving wine/night  Tobacco -.   Use denied  Drugs - Use denied  Caffeine - 6oz coffee/day     Medical History:  History of  birth, 30wks, with baby 8wks in NICU.  Birth of son at full term.  Breast feeding going well.  See EMR for further info.  Past Medical History:   Diagnosis Date     Family history of breast cancer gene mutation in first degree relative     gene tested CHek 2 associated with increased risk  q6 mos mammo starting at 33      HELLP (hemolytic anemia/elev liver enzymes/low platelets in pregnancy)      PCOS (polycystic ovarian syndrome)     age 12 no treatment necessary      Preeclampsia, severe, third trimester     HTN, proteinuria, 30wks-->mag, IOL x 24 hrs->C/S       Psychological Symptoms:  Sad and crying up to 2x/day or trying to  bury sadness.   Denied anhedonia.  Appetite quite low and sometimes going 24hrs with only eating crackers, now able to  force  herself to eat.  Sleep fairly good, awakening with childcare2-3x/night.  Guilt around not  entertaining  her daughter.  Energy low and attributed to ,  mental energy  ok.  Worry focused on  balance , being a good mom, work (missing), financial (but noted no financial problems).  Worry is  paralyzing, I can t do anything.   Denied symptoms of OCD, denied panic attacks, denied symptoms of eating disorder current or past.    Denied suicidal or homicidal ideation intent or plan.      Mental Status: Presented casually dressed and groomed.  Affect dysphoric and upon entering visit room was tearful.   Speech was of normal tone rate and volume.  Eye contact was within normal limits. Formal cognitive assessment was not conducted.  Ms. Cardoza was oriented to time, person, place and situation. Thoughts were logical and connected.  Recent and remote memory appeared intact in the context of this visit, and no concentration problems evident.  Fund of  "knowledge not assessed but appeared consistent with occupation. Judgment appeared good.     Strengths: housing, job, social support    Multiaxial Diagnoses:   Axis I:  Unspecified anxiety disorder; Unspecified depression (\"baby blues\")  Axis II: Deferred   Axis III: vasquez born 1/8/20; HELLP syndrome and continuing high bp.      Axis IV:  None  Axis V: GAF = 55-60(current)    Impressions & Plan:  Marcia Cardoza is a 29 year-old, , postpartum woman, presenting with anxiety and depressed mood.  She has never experienced mental health problems and always has been able to  pull myself up by my bootstraps  and successfully address challenges.  She believes she now has  unjustified sadness  and worry.  Worry focuses on not being able to take care of 2 children, not being able to balance her life including self care.  She is reporting good social support from family but would also like to connect with other moms and engage with her children in activities outside the home.     Time spent on reviewing her assessment with her: her history of success with  pushing through  and belief she has to do it all and all well.  Importance of directing 4yo to engage in activities and meeting other moms, (SHANA and mom-baby yoga suggested).  Also communicating to support system what she needs including words of support.  Ms Cardoza expressed confidence that she could use these strategies and she was given them in written form.  Psychotherapy is recommended.  Information provided on how to schedule followup visits and how to be included on waitlist.  Ms Cardoza expressed understanding.      Total time spent = 55 minutes psychological assessment    "

## 2020-01-28 ENCOUNTER — ALLIED HEALTH/NURSE VISIT (OUTPATIENT)
Dept: OBGYN | Facility: CLINIC | Age: 30
End: 2020-01-28
Payer: COMMERCIAL

## 2020-01-28 ENCOUNTER — OFFICE VISIT (OUTPATIENT)
Dept: PSYCHOLOGY | Facility: CLINIC | Age: 30
End: 2020-01-28
Payer: COMMERCIAL

## 2020-01-28 VITALS — SYSTOLIC BLOOD PRESSURE: 114 MMHG | DIASTOLIC BLOOD PRESSURE: 79 MMHG

## 2020-01-28 DIAGNOSIS — F41.9 ANXIETY DISORDER, UNSPECIFIED TYPE: Primary | ICD-10-CM

## 2020-01-28 DIAGNOSIS — F32.A DEPRESSION, UNSPECIFIED DEPRESSION TYPE: ICD-10-CM

## 2020-01-28 DIAGNOSIS — Z01.30 BLOOD PRESSURE CHECK: Primary | ICD-10-CM

## 2020-01-28 PROCEDURE — 40000269 ZZH STATISTIC NO CHARGE FACILITY FEE: Mod: ZF
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
## 2020-02-20 ENCOUNTER — OFFICE VISIT (OUTPATIENT)
Dept: OBGYN | Facility: CLINIC | Age: 30
End: 2020-02-20
Attending: ADVANCED PRACTICE MIDWIFE
Payer: COMMERCIAL

## 2020-02-20 VITALS
HEIGHT: 65 IN | WEIGHT: 164.7 LBS | DIASTOLIC BLOOD PRESSURE: 86 MMHG | SYSTOLIC BLOOD PRESSURE: 124 MMHG | HEART RATE: 82 BPM | BODY MASS INDEX: 27.44 KG/M2

## 2020-02-20 DIAGNOSIS — Z12.4 SCREENING FOR MALIGNANT NEOPLASM OF CERVIX: ICD-10-CM

## 2020-02-20 PROBLEM — Z87.59 HISTORY OF SEVERE PRE-ECLAMPSIA: Status: RESOLVED | Noted: 2019-06-14 | Resolved: 2020-02-20

## 2020-02-20 PROBLEM — Z36.89 ENCOUNTER FOR TRIAGE IN PREGNANT PATIENT: Status: RESOLVED | Noted: 2020-01-02 | Resolved: 2020-02-20

## 2020-02-20 PROBLEM — Z98.891 HISTORY OF CESAREAN DELIVERY: Status: RESOLVED | Noted: 2019-06-14 | Resolved: 2020-02-20

## 2020-02-20 PROBLEM — O09.90 HIGH-RISK PREGNANCY, UNSPECIFIED TRIMESTER: Status: RESOLVED | Noted: 2019-06-14 | Resolved: 2020-02-20

## 2020-02-20 PROBLEM — O34.219 PREVIOUS CESAREAN DELIVERY, ANTEPARTUM CONDITION OR COMPLICATION: Status: RESOLVED | Noted: 2019-10-30 | Resolved: 2020-02-20

## 2020-02-20 PROBLEM — O23.40 UTI IN PREGNANCY: Status: RESOLVED | Noted: 2019-09-18 | Resolved: 2020-02-20

## 2020-02-20 PROBLEM — O40.9XX0 POLYHYDRAMNIOS: Status: RESOLVED | Noted: 2019-11-26 | Resolved: 2020-02-20

## 2020-02-20 PROBLEM — Z98.891 S/P CESAREAN SECTION: Status: RESOLVED | Noted: 2020-01-08 | Resolved: 2020-02-20

## 2020-02-20 PROCEDURE — G0145 SCR C/V CYTO,THINLAYER,RESCR: HCPCS | Performed by: ADVANCED PRACTICE MIDWIFE

## 2020-02-20 PROCEDURE — 87624 HPV HI-RISK TYP POOLED RSLT: CPT | Performed by: ADVANCED PRACTICE MIDWIFE

## 2020-02-20 PROCEDURE — G0123 SCREEN CERV/VAG THIN LAYER: HCPCS | Performed by: ADVANCED PRACTICE MIDWIFE

## 2020-02-20 PROCEDURE — G0463 HOSPITAL OUTPT CLINIC VISIT: HCPCS | Mod: ZF

## 2020-02-20 ASSESSMENT — MIFFLIN-ST. JEOR: SCORE: 1462.95

## 2020-02-20 ASSESSMENT — ANXIETY QUESTIONNAIRES
5. BEING SO RESTLESS THAT IT IS HARD TO SIT STILL: NOT AT ALL
6. BECOMING EASILY ANNOYED OR IRRITABLE: SEVERAL DAYS
2. NOT BEING ABLE TO STOP OR CONTROL WORRYING: NOT AT ALL
7. FEELING AFRAID AS IF SOMETHING AWFUL MIGHT HAPPEN: NOT AT ALL
GAD7 TOTAL SCORE: 2
3. WORRYING TOO MUCH ABOUT DIFFERENT THINGS: SEVERAL DAYS
1. FEELING NERVOUS, ANXIOUS, OR ON EDGE: NOT AT ALL

## 2020-02-20 ASSESSMENT — PATIENT HEALTH QUESTIONNAIRE - PHQ9
SUM OF ALL RESPONSES TO PHQ QUESTIONS 1-9: 3
5. POOR APPETITE OR OVEREATING: NOT AT ALL

## 2020-02-20 ASSESSMENT — PAIN SCALES - GENERAL: PAINLEVEL: NO PAIN (0)

## 2020-02-20 NOTE — PROGRESS NOTES
"Nursing Notes:   Odin Conklin LPN  2020 10:48 AM  Signed  SUBJECTIVE:   Ameena Cardoza is here for her 6-week postpartum checkup.     PHQ-9 score:    Hx of Abuse:       Delivery Date: 20.    Delivering provider:  Ana Phillips MD.    Type of delivery:  .     Delivery complications: None  Infant gender:  boy, \"Thatcherr\"weight 8 pounds 4 oz.  Feeding Method:   and Bottlefed.  Complications reported with feeding:  none, infant thriving . Infant with Hypothyroidism noted on metabolic screen-has seen endocrine.  Bleeding:  None.  Duration:  4 wks.  Menses resumed:  No  Bowel/Urinary problems:  No    Contraception Planned:  tubal ligation  She  has not had intercourse since delivery..         ================================================================  ROS: 10 point ROS neg other than the symptoms noted above in the HPI.   Patient did have significant \"baby blues\" after delivery, worked with Dr. Huntley, feeling much better and no longer interested in therapy.  Does endorse a small red area on incision with drainage.  Incision continues to be tender.  Wondering if she should take iron supplement due to vegetarian diet.      EXAM:  /86   Pulse 82   Ht 1.651 m (5' 5\")   Wt 74.7 kg (164 lb 11.2 oz)   LMP 2019 (Approximate)   BMI 27.41 kg/m      General: healthy, alert and no distress  Psych: NEGATIVE  Last PHQ-9 score on record= No Value exists for the : HP#PHQ9  Breasts:  Lactating, Nipples intact with no lesions, Non-tender and No S/S of yeast or mastitis  Abdomen: Benign, Soft, flat, non-tender, No masses, organomegaly and Diastasis less than 1-2 FB  Incision:  well healed, small superficial area of skin separation about 4cm from left side of incision.  No obvious drainage today.  No palpable areas of induration noted.    Vulva:  Normal genitalia and Bartholin's, Urethra, [place]'s normal  Vagina:  normal with good muscle tone  Cervix:  Multiparous,, no lesions and " pink, moist, closed, without lesion or CMT.    Uterus:  fully involuted and non-tender    Adnexa:  Within normal limits and No masses, nodularity, tenderness  Recto-vaginal:   anus normal        ASSESSMENT:   Encounter Diagnoses   Name Primary?     Routine postpartum follow-up      Postpartum care and examination of lactating mother       Normal postpartum exam after repeat c/s  Pregnancy was complicated by:  none.      PLAN:  Orders Placed This Encounter   Procedures     Obtaining, preparing and conveyance of cervical or vaginal smear to laboratory.     Pap imaged thin layer screen with HPV - recommended age 30 - 65 years (select HPV order below)     HPV High Risk Types DNA Cervical         Reviewed Hgb in pregnancy and postpartum-no additional iron supplement recommended at this time.  Reviewed signs of anemia and when to check Hgb.  Discussed calcium intake, vitamins and supplements including Vitamin D  Exercise encouraged  Follow up in 1 year  HERNAN Yuan CNM

## 2020-02-20 NOTE — LETTER
"2020       RE: Ameena Cardoza  [address] [place] [address]     Dear Colleague,    Thank you for referring your patient, Ameena Cardoza, to the [facility] at [facility]. Please see a copy of my visit note below.    Nursing Notes:   Odin Conklin LPN  2020 10:48 AM  Signed  SUBJECTIVE:   Ameena Cardoza is here for her 6-week postpartum checkup.     PHQ-9 score:    Hx of Abuse:       Delivery Date: 20.    Delivering provider:  Ana Phillips MD.    Type of delivery:  .     Delivery complications: None  Infant gender:  boy, \"Thatcherr\"weight 8 pounds 4 oz.  Feeding Method:   and Bottlefed.  Complications reported with feeding:  none, infant thriving . Infant with Hypothyroidism noted on metabolic screen-has seen endocrine.  Bleeding:  None.  Duration:  4 wks.  Menses resumed:  No  Bowel/Urinary problems:  No    Contraception Planned:  tubal ligation  She  has not had intercourse since delivery..         ================================================================  ROS: 10 point ROS neg other than the symptoms noted above in the HPI.   Patient did have significant \"baby blues\" after delivery, worked with Dr. Huntley, feeling much better and no longer interested in therapy.  Does endorse a small red area on incision with drainage.  Incision continues to be tender.  Wondering if she should take iron supplement due to vegetarian diet.      EXAM:  /86   Pulse 82   Ht 1.651 m (5' 5\")   Wt 74.7 kg (164 lb 11.2 oz)   LMP 2019 (Approximate)   BMI 27.41 kg/m       General: healthy, alert and no distress  Psych: NEGATIVE  Last PHQ-9 score on record= No Value exists for the : HP#PHQ9  Breasts:  Lactating, Nipples intact with no lesions, Non-tender and No S/S of yeast or mastitis  Abdomen: Benign, Soft, flat, non-tender, No masses, organomegaly and Diastasis less than 1-2 FB  Incision:  " well healed, small superficial area of skin separation about 4cm from left side of incision.  No obvious drainage today.  No palpable areas of induration noted.    Vulva:  Normal genitalia and Bartholin's, Urethra, Delton's normal  Vagina:  normal with good muscle tone  Cervix:  Multiparous,, no lesions and pink, moist, closed, without lesion or CMT.    Uterus:  fully involuted and non-tender    Adnexa:  Within normal limits and No masses, nodularity, tenderness  Recto-vaginal:   anus normal        ASSESSMENT:   Encounter Diagnoses   Name Primary?     Routine postpartum follow-up      Postpartum care and examination of lactating mother       Normal postpartum exam after repeat c/s  Pregnancy was complicated by:  none.      PLAN:  Orders Placed This Encounter   Procedures     Obtaining, preparing and conveyance of cervical or vaginal smear to laboratory.     Pap imaged thin layer screen with HPV - recommended age 30 - 65 years (select HPV order below)     HPV High Risk Types DNA Cervical         Reviewed Hgb in pregnancy and postpartum-no additional iron supplement recommended at this time.  Reviewed signs of anemia and when to check Hgb.  Discussed calcium intake, vitamins and supplements including Vitamin D  Exercise encouraged  Follow up in 1 year    HERNAN Yuan CNM

## 2020-02-20 NOTE — NURSING NOTE
SUBJECTIVE:   Ameena Cardoza is here for her 6-week postpartum checkup.     PHQ-9 score:    Hx of Abuse:       Delivery Date: 20.    Delivering provider:  Ana Phillips MD.    Type of delivery:  .     Delivery complications: None  Infant gender:  boy, weight 8 pounds 4 oz.  Feeding Method:   and Bottlefed.  Complications reported with feeding:  none, infant thriving .    Bleeding:  None.  Duration:  4 wks.  Menses resumed:  No  Bowel/Urinary problems:  No    Contraception Planned:  tubal ligation  She  has not had intercourse since delivery..

## 2020-02-21 ASSESSMENT — ANXIETY QUESTIONNAIRES: GAD7 TOTAL SCORE: 2

## 2020-02-26 LAB
COPATH REPORT: NORMAL
PAP: NORMAL

## 2020-02-27 LAB
FINAL DIAGNOSIS: NORMAL
HPV HR 12 DNA CVX QL NAA+PROBE: NEGATIVE
HPV16 DNA SPEC QL NAA+PROBE: NEGATIVE
HPV18 DNA SPEC QL NAA+PROBE: NEGATIVE
SPECIMEN DESCRIPTION: NORMAL
SPECIMEN SOURCE CVX/VAG CYTO: NORMAL

## 2020-03-11 ENCOUNTER — HEALTH MAINTENANCE LETTER (OUTPATIENT)
Age: 30
End: 2020-03-11

## 2020-04-09 ENCOUNTER — VIRTUAL VISIT (OUTPATIENT)
Dept: FAMILY MEDICINE | Facility: OTHER | Age: 30
End: 2020-04-09

## 2020-04-09 NOTE — PROGRESS NOTES
"Date: 2020 11:02:45  Clinician: Lane Galo  Clinician NPI: [id number]  Patient: Ameena Cardoza  Patient : 1990  Patient Address: [address], [place] [address]  Patient Phone: [phone]  Visit Protocol: UTI  Patient Summary:  Ameena is a 29 year old ( : 1990 ) female who initiated a Visit for a presumed bladder infection. When asked the question \"Please sign me up to receive news, health information and promotions from [employer].\", Ameena responded \"No\".   Her symptoms started 1-3 days ago and consist of dysuria, feeling as if the bladder is never empty, urinary frequency, and urgency.   Symptom details   Urine color: The color of her urine is yellow.    Denied symptoms include foul-smelling urine, nausea, flank pain, abdominal pain, chills, vaginal discharge, urinary incontinence, vomiting, and vaginal itching. She does not feel feverish.   Ameena has not used any over-the-counter medications or home remedies to relieve her current symptoms.  Precipitating events  Ameena denies having a sexually transmitted disease.  Pertinent medical history  Ameena has had a bladder infection before and has had 2 in the past 12 months. Her most recent bladder infection was not within the last 4 weeks. Her current symptoms are similar to her previous bladder infection symptoms.   Ciprofloxacin (Cipro) has been effective in treating her past bladder infections.   She has experienced problems or side effects with the following antibiotics in the past: sulfamethoxazole-trimethoprim (Bactrim DS).  Problems or side effects as reported by the patient (free text): When taking any Sulfa based drugs, I've had GI problems (upset stomach and diarrhea).   Ameena has not been prescribed antibiotics to prevent frequent or repeated bladder infections in the past and does not get yeast infections when she takes antibiotics.   Ameena does not have a history of kidney stones. She has not used a " catheter or been a patient in a hospital or nursing home in the past 2 weeks.   Ameena does not smoke or use smokeless tobacco.   She denies pregnancy and is breastfeeding. Her last period was over a month ago.   Additional information as reported by the patient (free text): I want to reiterate that I am breastfeeding and want to make sure the antibiotic I'm prescribed is ok for baby. Thank you!     MEDICATIONS: No current medications, ALLERGIES: Sulfa (Sulfonamide Antibiotics)  Clinician Response:  Dear Ameena,  Based on the information you have provided, you likely have an acute urinary tract infection, also called a bladder infection. Bladder infections occur when bacteria from the outside of the body enters the urinary tract. Any part of the urinary system can be infected, but the bladder is the most common.  Medication information  I am prescribing:     Cephalexin (Keflex) 500 mg oral capsule. Take 1 capsule by mouth every 12 hours for 7 days. There are no refills with this prescription.   The medication I prescribed for your bladder infection is an antibiotic. Continue taking the medication until it is gone even if you feel better. If you get an upset stomach while taking antibiotics, taking the medication with food can help.   Yeast infections can be a common side effect of antibiotics. The most common symptom of a yeast infection is itchiness in and around the vagina. Other signs and symptoms include burning, redness, or a thick, white vaginal discharge that looks like cottage cheese and does not have a bad smell.  Self care  Urination helps to flush bacteria from the urinary tract. For this reason, drinking water and urinating often helps relieve some urinary symptoms and can decrease your risk of getting bladder infections in the future.  Other steps you can take to prevent future bladder infections include:     Wipe front to back after using the bathroom    Urinate after sexual intercourse    Avoid  using deodorant sprays, douches, or powders in the vaginal area     When to seek care  Please make an appointment to be seen in a clinic or urgent care if any of the following occur:     You develop new symptoms or your symptoms become worse    You have medication side effects that make it difficult to take them as prescribed    Your symptoms do not improve within 1-2 days of starting treatment    You have symptoms of a bladder infection that return shortly after completing treatment     It is possible to have an allergic reaction to an antibiotic even if you have not had one in the past. If you notice a new rash, significant swelling, or difficulty breathing, stop taking this medication immediately and go to a clinic or urgent care.   Diagnosis: Acute uncomplicated bladder infection  Diagnosis ICD: N39.0  Prescription: cephalexin (Keflex) 500 mg oral capsule 14 capsule, 7 days supply. Take 1 capsule by mouth every 12 hours for 7 days. Refills: 0, Refill as needed: no, Allow substitutions: yes  Pharmacy: [id number] - [phone] - [address] [place] [address]

## 2021-01-03 ENCOUNTER — HEALTH MAINTENANCE LETTER (OUTPATIENT)
Age: 31
End: 2021-01-03

## 2021-04-25 ENCOUNTER — HEALTH MAINTENANCE LETTER (OUTPATIENT)
Age: 31
End: 2021-04-25

## 2021-06-27 ASSESSMENT — ENCOUNTER SYMPTOMS
FEVER: 0
BREAST MASS: 0
NAUSEA: 0
PARESTHESIAS: 0
WEAKNESS: 0
COUGH: 0
DYSURIA: 0
ARTHRALGIAS: 1
MYALGIAS: 0
HEARTBURN: 0
JOINT SWELLING: 0
DIZZINESS: 0
SORE THROAT: 0
PALPITATIONS: 0
SHORTNESS OF BREATH: 0
CHILLS: 0
FREQUENCY: 0
HEMATOCHEZIA: 0
DIARRHEA: 0
CONSTIPATION: 0
HEADACHES: 0
HEMATURIA: 0
ABDOMINAL PAIN: 0
EYE PAIN: 0
NERVOUS/ANXIOUS: 0

## 2021-06-28 ENCOUNTER — MYC MEDICAL ADVICE (OUTPATIENT)
Dept: PEDIATRICS | Facility: CLINIC | Age: 31
End: 2021-06-28

## 2021-06-28 ENCOUNTER — TELEPHONE (OUTPATIENT)
Dept: PEDIATRICS | Facility: CLINIC | Age: 31
End: 2021-06-28

## 2021-06-28 ENCOUNTER — OFFICE VISIT (OUTPATIENT)
Dept: PEDIATRICS | Facility: CLINIC | Age: 31
End: 2021-06-28
Payer: COMMERCIAL

## 2021-06-28 VITALS
RESPIRATION RATE: 14 BRPM | DIASTOLIC BLOOD PRESSURE: 82 MMHG | BODY MASS INDEX: 30.59 KG/M2 | OXYGEN SATURATION: 97 % | SYSTOLIC BLOOD PRESSURE: 118 MMHG | WEIGHT: 183.6 LBS | TEMPERATURE: 98.8 F | HEIGHT: 65 IN | HEART RATE: 61 BPM

## 2021-06-28 DIAGNOSIS — Z83.49 FAMILY HISTORY OF THYROID DISEASE: ICD-10-CM

## 2021-06-28 DIAGNOSIS — G89.29 CHRONIC PAIN OF RIGHT KNEE: ICD-10-CM

## 2021-06-28 DIAGNOSIS — R09.A2 SENSATION OF LUMP IN THROAT: ICD-10-CM

## 2021-06-28 DIAGNOSIS — Z00.00 ROUTINE GENERAL MEDICAL EXAMINATION AT A HEALTH CARE FACILITY: Primary | ICD-10-CM

## 2021-06-28 DIAGNOSIS — Z84.81 FAMILY HISTORY OF BREAST CANCER GENE MUTATION IN FIRST DEGREE RELATIVE: ICD-10-CM

## 2021-06-28 DIAGNOSIS — M25.561 CHRONIC PAIN OF RIGHT KNEE: ICD-10-CM

## 2021-06-28 DIAGNOSIS — Z80.3 FAMILY HISTORY OF MALIGNANT NEOPLASM OF BREAST: ICD-10-CM

## 2021-06-28 PROCEDURE — 99395 PREV VISIT EST AGE 18-39: CPT | Performed by: NURSE PRACTITIONER

## 2021-06-28 RX ORDER — TRIAMCINOLONE ACETONIDE 1 MG/G
CREAM TOPICAL
COMMUNITY
Start: 2021-05-27 | End: 2021-06-28

## 2021-06-28 RX ORDER — BETAMETHASONE DIPROPIONATE 0.5 MG/G
LOTION TOPICAL
COMMUNITY
Start: 2020-09-15 | End: 2021-06-28

## 2021-06-28 RX ORDER — KETOCONAZOLE 20 MG/ML
SHAMPOO TOPICAL
COMMUNITY
Start: 2020-09-15 | End: 2021-06-28

## 2021-06-28 RX ORDER — TACROLIMUS 1 MG/G
OINTMENT TOPICAL
COMMUNITY
Start: 2020-09-16 | End: 2021-06-28

## 2021-06-28 RX ORDER — DESONIDE 0.5 MG/G
OINTMENT TOPICAL
COMMUNITY
Start: 2021-05-27 | End: 2024-09-06

## 2021-06-28 RX ORDER — DESONIDE 0.5 MG/G
OINTMENT TOPICAL
COMMUNITY
Start: 2019-09-01 | End: 2021-06-28

## 2021-06-28 SDOH — HEALTH STABILITY: MENTAL HEALTH: HOW OFTEN DO YOU HAVE A DRINK CONTAINING ALCOHOL?: NOT ASKED

## 2021-06-28 SDOH — HEALTH STABILITY: MENTAL HEALTH: HOW OFTEN DO YOU HAVE 6 OR MORE DRINKS ON ONE OCCASION?: NOT ASKED

## 2021-06-28 SDOH — HEALTH STABILITY: MENTAL HEALTH: HOW MANY STANDARD DRINKS CONTAINING ALCOHOL DO YOU HAVE ON A TYPICAL DAY?: NOT ASKED

## 2021-06-28 ASSESSMENT — ENCOUNTER SYMPTOMS
PARESTHESIAS: 0
CHILLS: 0
COUGH: 0
CONSTIPATION: 0
FEVER: 0
JOINT SWELLING: 0
NERVOUS/ANXIOUS: 0
DIZZINESS: 0
HEADACHES: 0
NAUSEA: 0
ABDOMINAL PAIN: 0
DIARRHEA: 0
HEMATOCHEZIA: 0
EYE PAIN: 0
WEAKNESS: 0
ARTHRALGIAS: 1
DYSURIA: 0
PALPITATIONS: 0
HEARTBURN: 0
HEMATURIA: 0
SORE THROAT: 0
FREQUENCY: 0
BREAST MASS: 0
MYALGIAS: 0
SHORTNESS OF BREATH: 0

## 2021-06-28 ASSESSMENT — MIFFLIN-ST. JEOR: SCORE: 1543.68

## 2021-06-28 NOTE — PROGRESS NOTES
SUBJECTIVE:   CC: Ameena Cardoza is an 30 year old woman who presents for preventive health visit.     Patient has been advised of split billing requirements and indicates understanding: Yes     Healthy Habits:     Getting at least 3 servings of Calcium per day:  Yes    Bi-annual eye exam:  Yes    Dental care twice a year:  Yes    Sleep apnea or symptoms of sleep apnea:  None    Diet:  Vegetarian/vegan    Frequency of exercise:  2-3 days/week    Duration of exercise:  45-60 minutes    Taking medications regularly:  Yes    Medication side effects:  None    PHQ-2 Total Score: 0    Additional concerns today:  Yes    Pap - utd    Chronic right knee pain since high school, two patellar disolactions, never fully recovered but stopped playing sports so pain improved. Now exercising again and it's starting to bother her.    Sensation of lump in throat, once/day, lasts 1 minute. Not timed with eating. Occurs at various times of day. Ongoing x 3-4 weeks. No difficulty swallowing.     Enjoys walking 60 min moderate paced speed every other day.     Today's PHQ-2 Score:   PHQ-2 ( 1999 Pfizer) 6/27/2021   Q1: Little interest or pleasure in doing things 0   Q2: Feeling down, depressed or hopeless 0   PHQ-2 Score 0   Q1: Little interest or pleasure in doing things Not at all   Q2: Feeling down, depressed or hopeless Not at all   PHQ-2 Score 0     Abuse: Current or Past (Physical, Sexual or Emotional) - No  Do you feel safe in your environment? Yes    Have you ever done Advance Care Planning? (For example, a Health Directive, POLST, or a discussion with a medical provider or your loved ones about your wishes): No, advance care planning information given to patient to review.  Patient declined advance care planning discussion at this time.    Social History     Tobacco Use     Smoking status: Never Smoker     Smokeless tobacco: Never Used   Substance Use Topics     Alcohol use: Not Currently     Alcohol Use 6/27/2021    Prescreen: >3 drinks/day or >7 drinks/week? Yes   AUDIT SCORE  5     Reviewed orders with patient.  Reviewed health maintenance and updated orders accordingly - Yes  BP Readings from Last 3 Encounters:   06/28/21 118/82   02/20/20 124/86   01/28/20 114/79    Wt Readings from Last 3 Encounters:   06/28/21 83.3 kg (183 lb 9.6 oz)   02/20/20 74.7 kg (164 lb 11.2 oz)   01/16/20 74.5 kg (164 lb 4.8 oz)                    Breast Cancer Screening:    FHS-7:   Breast CA Risk Assessment (FHS-7) 6/27/2021   Did any of your first-degree relatives have breast or ovarian cancer? No   Did any of your relatives have bilateral breast cancer? No   Did any man in your family have breast cancer? No   Did any woman in your family have breast and ovarian cancer? No   Did any woman in your family have breast cancer before age 50 y? Yes   Do you have 2 or more relatives with breast and/or ovarian cancer? Yes   Do you have 2 or more relatives with breast and/or bowel cancer? No     Declines genetic testing today.   Patient under 40 years of age: Routine Mammogram Screening not recommended.   Pertinent mammograms are reviewed under the imaging tab.    History of abnormal Pap smear: NO - age 30-65 PAP every 5 years with negative HPV co-testing recommended  PAP / HPV Latest Ref Rng & Units 2/20/2020   PAP - NIL   HPV 16 DNA NEG:Negative Negative   HPV 18 DNA NEG:Negative Negative   OTHER HR HPV NEG:Negative Negative     Reviewed and updated as needed this visit by clinical staff  Tobacco  Allergies  Meds   Med Hx  Surg Hx  Fam Hx  Soc Hx        Reviewed and updated as needed this visit by Provider                Past Medical History:   Diagnosis Date     Dislocation of right patella      Family history of breast cancer gene mutation in first degree relative     gene tested CHek 2 associated with increased risk  q6 mos mammo starting at 33      HELLP (hemolytic anemia/elev liver enzymes/low platelets in pregnancy)      PCOS (polycystic  ovarian syndrome)     age 12 no treatment necessary      Preeclampsia, severe, third trimester     HTN, proteinuria, 30wks-->mag, IOL x 24 hrs->C/S     Past Surgical History:   Procedure Laterality Date      SECTION      30 wks gestation, HTN, induction->C/S,       SECTION, TUBAL LIGATION, COMBINED N/A 2020    Procedure:  SECTION, WITH POSTPARTUM TUBAL LIGATION;  Surgeon: Ana Phillips MD;  Location:  L+D     Family History   Problem Relation Age of Onset     Breast Cancer Paternal Grandmother         age 40-x 2 bouts     Hypertension Father 45     Breast Cancer Maternal Aunt         40     Breast Cancer Maternal Aunt         40     Diabetes Paternal Grandfather      Hypothyroidism Mother      Obesity Mother      No Known Problems Sister      Cerebrovascular Disease Maternal Grandmother      ALS Maternal Grandfather 38     Hypothyroidism Son      Social History     Socioeconomic History     Marital status:      Spouse name: Red     Number of children: 1     Years of education: Not on file     Highest education level: Not on file   Occupational History     Comment: advisor, undergrad, learning abroad   Social Needs     Financial resource strain: Not on file     Food insecurity     Worry: Not on file     Inability: Not on file     Transportation needs     Medical: Not on file     Non-medical: Not on file   Tobacco Use     Smoking status: Never Smoker     Smokeless tobacco: Never Used   Substance and Sexual Activity     Alcohol use: Yes     Comment: 1-2 drinks/week     Drug use: Not Currently     Sexual activity: Yes     Partners: Male     Birth control/protection: Female Surgical   Lifestyle     Physical activity     Days per week: Not on file     Minutes per session: Not on file     Stress: Not on file   Relationships     Social connections     Talks on phone: Not on file     Gets together: Not on file     Attends [employer] service: Not on file     Active  "member of club or organization: Not on file     Attends meetings of clubs or organizations: Not on file     Relationship status: Not on file     Intimate partner violence     Fear of current or ex partner: Not on file     Emotionally abused: Not on file     Physically abused: Not on file     Forced sexual activity: Not on file   Other Topics Concern     Not on file   Social History Narrative    Lives in [facility] [place] [facility]. Two kids, 5 (girl) and 1.5 years (boy). ,  is an . Working from home, works at the [employer] [place].         6/28/21    Kelli Queen, DNP, APRN, CNP     Current Outpatient Medications   Medication     desonide (DESOWEN) 0.05 % external ointment     No current facility-administered medications for this visit.         Allergies   Allergen Reactions     Sulfa Drugs GI Disturbance       Review of Systems   Constitutional: Negative for chills and fever.   HENT: Negative for congestion, ear pain, hearing loss and sore throat.    Eyes: Negative for pain and visual disturbance.   Respiratory: Negative for cough and shortness of breath.    Cardiovascular: Negative for chest pain, palpitations and peripheral edema.   Gastrointestinal: Negative for abdominal pain, constipation, diarrhea, heartburn, hematochezia and nausea.   Breasts:  Negative for tenderness, breast mass and discharge.   Genitourinary: Negative for dysuria, frequency, genital sores, hematuria, pelvic pain, urgency, vaginal bleeding and vaginal discharge.   Musculoskeletal: Positive for arthralgias. Negative for joint swelling and myalgias.   Skin: Negative for rash.   Neurological: Negative for dizziness, weakness, headaches and paresthesias.   Psychiatric/Behavioral: Negative for mood changes. The patient is not nervous/anxious.         OBJECTIVE:   /82 (BP Location: Right arm, Patient Position: Chair, Cuff Size: Adult Regular)   Pulse 61   Temp 98.8  F (37.1  C) (Tympanic)   Resp 14   Ht 1.651 m (5' 5\")   Wt " 83.3 kg (183 lb 9.6 oz)   SpO2 97%   BMI 30.55 kg/m    Physical Exam  Constitutional: appears to be in no acute distress, comfortable, pleasant.   Eyes: anicteric, conjunctiva clear without drainage or erythema. BREANN.   Ears, Nose and Throat: tympanic membranes gray with LR,  nose without nasal discharge. OP: no erythema to posterior pharynx, negative post nasal drainage, tonsils +1 no erythema or exudate.  Neck: supple, thyroid palpable,not enlarged, no nodules   Breast: Exam deferred (deferred after discussion of exam options with patient, no symptoms or concerns).   Cardiovascular: regular rate and rhythm, normal S1 and S2, no murmurs, rubs or gallops, peripheral pulses full and symmetric; negative peripheral edema   Respiratory: Air entry throughout. Breathing pattern unlabored without the use of accessory muscles. Clear to auscultation A and P, no wheezes or crackles, normal breath sounds.    Gastrointestinal: rounded, soft. Positive bowel sounds x4, nontender, no masses.   Genitourinary: Exam deferred (deferred after discussion of exam options with patient, no symptoms or concerns, pap utd).   Musculoskeletal: full range of motion, no edema.   Skin: pink, turgor smooth and elastic. Negative for lesions or dryness.  Neurological: normal gait, no tremor.   Psychological: appropriate mood and affect.   Lymphatic: no cervical, axillary, supraclavicular, or infraclavicular lymphadenopathy.    Diagnostic Test Results:  Labs reviewed in Epic    ASSESSMENT/PLAN:   1. Routine general medical examination at a health care facility  Age appropriate screening and preventative care have been addressed today. Vaccinations have been reviewed. Patient has been advised to undertake routine aerobic activity. Recommend annual vision exams as well as biannual dental exams. They will follow up for annual physical again in one year.   Plan:   - REVIEW OF HEALTH MAINTENANCE PROTOCOL ORDERS   - Lipid panel reflex to direct LDL  "Fasting; Future   - Glucose; Future    2. Chronic pain of right knee  Chronic right knee pain since high school, two patellar disolactions, never fully recovered but stopped playing sports so pain improved. Now exercising again and it's starting to bother her.  Plan:   - Orthopedic  Referral; Future    3. Family history of thyroid disease  Mother with history of hypothyroidism. Son diagnosed with hypothyroidism on  metabolic screen.   Plan:   - TSH with free T4 reflex; Future    4. Family history of malignant neoplasm of breast  History of breast cancer in paternal grandmother, and two maternal aunts, all of whom were diagnosed in their 40s. Recommended and offered genetic counseling referral today, however patient declined. Based on patient's family history and recommendations from the CDC, it will be best to determine the appropriate mammogram screening schedule after pursuing formal genetic counseling.     5. Sensation of lump in throat  Sensation of lump in throat, once/day, lasts 1 minute. Not timed with eating. Occurs at various times of day. Ongoing x 3-4 weeks. No difficulty swallowing. Discussed possibility of acid reflux. Checking thyroid as above. Continue to monitor for now, counseled on reasons to return to clinic for further work up.       Follow-up: Lab results pending, will follow-up as indicated after reviewing results.       COUNSELING:  Reviewed preventive health counseling, as reflected in patient instructions  Special attention given to:        Regular exercise       Vision screening       Alcohol Use       Osteoporosis prevention/bone health    Estimated body mass index is 30.55 kg/m  as calculated from the following:    Height as of this encounter: 1.651 m (5' 5\").    Weight as of this encounter: 83.3 kg (183 lb 9.6 oz).    Weight management plan: Discussed healthy diet and exercise guidelines    She reports that she has never smoked. She has never used smokeless " tobacco.      Counseling Resources:  ATP IV Guidelines  Pooled Cohorts Equation Calculator  Breast Cancer Risk Calculator  BRCA-Related Cancer Risk Assessment: FHS-7 Tool  FRAX Risk Assessment  ICSI Preventive Guidelines  Dietary Guidelines for Americans, 2010  USDA's MyPlate  ASA Prophylaxis  Lung CA Screening    HERNAN Pelayo CNP  [facility]

## 2021-06-28 NOTE — TELEPHONE ENCOUNTER
Please help patient schedule fasting lab appointment.   Patient had appointment with Kelli Queen on 6/28/2021 and was unable to set up a lab appointment due to the schedules being blocked. Patient comes back from vacation on 7/12/2021.   Postponing to call patient to set up that appointment at a later time.     Sandra Vasquez MA

## 2021-07-02 NOTE — TELEPHONE ENCOUNTER
DIAGNOSIS: Chronic right knee pain/no baldevg/Kelli Queen    APPOINTMENT DATE: 7.12.21   NOTES STATUS DETAILS   OFFICE NOTE from referring provider Internal 6.28.21 RACHANA Pelayo IM   OFFICE NOTE from other specialist Care Everywhere PT in Care Everywhere 12.2.05-10.6.05    [facility] records scanned   DISCHARGE SUMMARY from hospital N/A    DISCHARGE REPORT from the ER N/A    OPERATIVE REPORT N/A    EMG report N/A    MEDICATION LIST Internal    MRI Care Everywhere REPORTS:  12.18.03 R knee, [facility]  9.17.03 R knee, [facility]   DEXA (osteoporosis/bone health) N/A    CT SCAN N/A    XRAYS (IMAGES & REPORTS) N/A

## 2021-07-06 NOTE — NURSING NOTE
Chief Complaint   Patient presents with     RECHECK     uti symptoms   last two days more dysuria- last week ua uc negative 7-   Sunscreen Recommendations: otc broad spectrum sunscreen with minimum spf of 30 or higher Detail Level: Zone Detail Level: Detailed Sunscreen Recommendations: otc broad spectrum sunscreen with minimum spf of 30

## 2021-07-08 ENCOUNTER — MEDICAL CORRESPONDENCE (OUTPATIENT)
Dept: HEALTH INFORMATION MANAGEMENT | Facility: CLINIC | Age: 31
End: 2021-07-08

## 2021-07-10 DIAGNOSIS — M25.561 RIGHT KNEE PAIN: Primary | ICD-10-CM

## 2021-07-12 ENCOUNTER — ANCILLARY PROCEDURE (OUTPATIENT)
Dept: GENERAL RADIOLOGY | Facility: CLINIC | Age: 31
End: 2021-07-12
Attending: FAMILY MEDICINE
Payer: COMMERCIAL

## 2021-07-12 ENCOUNTER — OFFICE VISIT (OUTPATIENT)
Dept: ORTHOPEDICS | Facility: CLINIC | Age: 31
End: 2021-07-12
Attending: NURSE PRACTITIONER
Payer: COMMERCIAL

## 2021-07-12 ENCOUNTER — PRE VISIT (OUTPATIENT)
Dept: ORTHOPEDICS | Facility: CLINIC | Age: 31
End: 2021-07-12

## 2021-07-12 VITALS — WEIGHT: 183 LBS | HEIGHT: 65 IN | BODY MASS INDEX: 30.49 KG/M2

## 2021-07-12 DIAGNOSIS — G89.29 CHRONIC PAIN OF RIGHT KNEE: ICD-10-CM

## 2021-07-12 DIAGNOSIS — M25.561 CHRONIC PAIN OF RIGHT KNEE: ICD-10-CM

## 2021-07-12 PROCEDURE — 73562 X-RAY EXAM OF KNEE 3: CPT | Mod: RT | Performed by: RADIOLOGY

## 2021-07-12 PROCEDURE — 99203 OFFICE O/P NEW LOW 30 MIN: CPT | Performed by: FAMILY MEDICINE

## 2021-07-12 ASSESSMENT — MIFFLIN-ST. JEOR: SCORE: 1540.96

## 2021-07-12 NOTE — PROGRESS NOTES
[facility] CLINICS AND SURGERY CENTER  SPORTS & ORTHOPEDIC CLINIC VISIT     Jul 12, 2021        ASSESSMENT & PLAN    29 yo with chronic intermittent right anterior knee pain that is likely patellofemoral in etiology    Reviewed imaging and assessment with patient in detail  Have recommended physical therapy  Discussed use of pull on knee brace with patellar cutout  Discussed the role for OTC medications including topical  Discussed that if she is not improving with regular home exercises and PT visits within 6 weeks she should follow-up in clinic.  Also follow-up if she is experiencing any new or worsening symptoms    Thomas Iglesias MD  [facility] SPORTS MEDICINE CLINIC [place]    -----  Chief Complaint   Patient presents with     Right Knee - Pain       SUBJECTIVE  Ameena Cardoza is a/an 30 year old female who is seen in consultation at the request of  Kelli Queen C.N.P. for evaluation of  Right knee pain.     The patient is seen with their daughter.  The patient is Right handed    Onset: 2 years(s) ago. 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 No    Do you have any vision problems? No    Do you have any chest pain or edema? No    Do you have any shortness of breath or wheezing?  No    Do you have stomach problems? No    Do you have any numbness or focal weakness? No    Do you have diabetes? No    Do you have problems with bleeding or clotting?  "No    Do you have an rashes or other skin lesions? No    OBJECTIVE:  Ht 1.651 m (5' 5\")   Wt 83 kg (183 lb)   BMI 30.45 kg/m       Patient is alert, No acute distress, pleasant and conversational.    Gait: nonantalgic. Normal heel toe gait.    right knee:   Skin intact. No erythema or ecchymosis.  No effusion or soft tissue swelling.    AROM: Zero to approximately 135  without restriction, crepitus noted    Palpation:  ttp over anterior medial joint line   No medial or lateral facet joint tenderness.  No posterior medial or posterior lateral joint line tenderness     Special Tests:  Negative bounce test, negative forced flexion and negative Buddy's.  No ligamentous laxity or pain with valgus or varus stress.  Negative Lachman's, Anterior Drawer and Posterior Drawer     Full Isometric quad strength, extensor mechanism in place     Neurovascularly intact in the lower extremity    Hip and Ankle with full AROM and nontender      RADIOLOGY:    3 view xrays of right knee performed and reviewed independently demonstrating no acute fx or dislocation, mild lateral patellar tilt. See EMR for formal radiology report.            "

## 2021-07-12 NOTE — LETTER
7/12/2021      RE: Ameena Cardoza  [address]  [facility] [id number]         EALTH CLINICS AND SURGERY CENTER  SPORTS & ORTHOPEDIC CLINIC VISIT     Jul 12, 2021        ASSESSMENT & PLAN    29 yo with chronic intermittent right anterior knee pain that is likely patellofemoral in etiology    Reviewed imaging and assessment with patient in detail  Have recommended physical therapy  Discussed use of pull on knee brace with patellar cutout  Discussed the role for OTC medications including topical  Discussed that if she is not improving with regular home exercises and PT visits within 6 weeks she should follow-up in clinic.  Also follow-up if she is experiencing any new or worsening symptoms    Thomas Iglesias MD  [facility] SPORTS MEDICINE CLINIC [place]    -----  Chief Complaint   Patient presents with     Right Knee - Pain       SUBJECTIVE  Ameena Cardoza is a/an 30 year old female who is seen in consultation at the request of  Kelli Queen C.N.P. for evaluation of  Right knee pain.     The patient is seen with their daughter.  The patient is Right handed    Onset: 2 years(s) ago. 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 No    Do you have any vision problems? No    Do you have any chest pain or edema? No    Do you have any shortness of breath or wheezing?  No    Do you have stomach problems? No    Do you have any numbness or focal  "weakness? No    Do you have diabetes? No    Do you have problems with bleeding or clotting? No    Do you have an rashes or other skin lesions? No    OBJECTIVE:  Ht 1.651 m (5' 5\")   Wt 83 kg (183 lb)   BMI 30.45 kg/m       Patient is alert, No acute distress, pleasant and conversational.    Gait: nonantalgic. Normal heel toe gait.    right knee:   Skin intact. No erythema or ecchymosis.  No effusion or soft tissue swelling.    AROM: Zero to approximately 135  without restriction, crepitus noted    Palpation:  ttp over anterior medial joint line   No medial or lateral facet joint tenderness.  No posterior medial or posterior lateral joint line tenderness     Special Tests:  Negative bounce test, negative forced flexion and negative Buddy's.  No ligamentous laxity or pain with valgus or varus stress.  Negative Lachman's, Anterior Drawer and Posterior Drawer     Full Isometric quad strength, extensor mechanism in place     Neurovascularly intact in the lower extremity    Hip and Ankle with full AROM and nontender      RADIOLOGY:    3 view xrays of right knee performed and reviewed independently demonstrating no acute fx or dislocation, mild lateral patellar tilt. See EMR for formal radiology report.                Thomas Iglesias MD    "

## 2021-07-13 NOTE — TELEPHONE ENCOUNTER
Called patient and LVM to return phone call to clinic in regards to scheduling.     Alisson Hearn, CMA

## 2021-07-20 NOTE — TELEPHONE ENCOUNTER
Called and assisted in scheduling a lab only appt.     Smiley Klein    July 20, 2021 at 10:56 AM

## 2021-08-27 ENCOUNTER — LAB (OUTPATIENT)
Dept: LAB | Facility: CLINIC | Age: 31
End: 2021-08-27
Payer: COMMERCIAL

## 2021-08-27 DIAGNOSIS — Z83.49 FAMILY HISTORY OF THYROID DISEASE: ICD-10-CM

## 2021-08-27 DIAGNOSIS — Z00.00 ROUTINE GENERAL MEDICAL EXAMINATION AT A HEALTH CARE FACILITY: ICD-10-CM

## 2021-08-27 PROCEDURE — 84443 ASSAY THYROID STIM HORMONE: CPT

## 2021-08-27 PROCEDURE — 36415 COLL VENOUS BLD VENIPUNCTURE: CPT

## 2021-08-27 PROCEDURE — 84439 ASSAY OF FREE THYROXINE: CPT

## 2021-08-27 PROCEDURE — 80061 LIPID PANEL: CPT

## 2021-08-27 PROCEDURE — 82947 ASSAY GLUCOSE BLOOD QUANT: CPT

## 2021-08-28 LAB
CHOLEST SERPL-MCNC: 192 MG/DL
FASTING STATUS PATIENT QL REPORTED: ABNORMAL
FASTING STATUS PATIENT QL REPORTED: ABNORMAL
GLUCOSE BLD-MCNC: 120 MG/DL (ref 70–99)
HDLC SERPL-MCNC: 60 MG/DL
LDLC SERPL CALC-MCNC: 103 MG/DL
NONHDLC SERPL-MCNC: 132 MG/DL
T4 FREE SERPL-MCNC: 0.76 NG/DL (ref 0.76–1.46)
TRIGL SERPL-MCNC: 143 MG/DL
TSH SERPL DL<=0.005 MIU/L-ACNC: 4.73 MU/L (ref 0.4–4)

## 2021-08-29 DIAGNOSIS — R73.01 ELEVATED FASTING GLUCOSE: Primary | ICD-10-CM

## 2021-08-29 PROBLEM — E03.8 SUBCLINICAL HYPOTHYROIDISM: Status: ACTIVE | Noted: 2021-08-29

## 2021-10-10 ENCOUNTER — HEALTH MAINTENANCE LETTER (OUTPATIENT)
Age: 31
End: 2021-10-10

## 2022-05-17 ENCOUNTER — E-VISIT (OUTPATIENT)
Dept: URGENT CARE | Facility: URGENT CARE | Age: 32
End: 2022-05-17
Payer: COMMERCIAL

## 2022-05-17 DIAGNOSIS — J01.90 ACUTE SINUSITIS WITH SYMPTOMS > 10 DAYS: Primary | ICD-10-CM

## 2022-05-17 PROCEDURE — 99421 OL DIG E/M SVC 5-10 MIN: CPT | Performed by: PHYSICIAN ASSISTANT

## 2022-05-17 NOTE — PATIENT INSTRUCTIONS
Sinusitis (Antibiotic Treatment)    The sinuses are air-filled spaces within the bones of the face. They connect to the inside of the nose. Sinusitis is an inflammation of the tissue that lines the sinuses. Sinusitis can occur during a cold. It can also happen due to allergies to pollens and other particles in the air. Sinusitis can cause symptoms of sinus congestion and a feeling of fullness. A sinus infection causes fever, headache, and facial pain. There is often green or yellow fluid draining from the nose or into the back of the throat (post-nasal drip). You have been given antibiotics to treat this condition.   Home care  Take the full course of antibiotics as instructed. Don't stop taking them, even when you feel better.  Drink plenty of water, hot tea, and other liquids as directed by the healthcare provider. This may help thin nasal mucus. It also may help your sinuses drain fluids.  Heat may help soothe painful areas of your face. Use a towel soaked in hot water. Or,  the shower and direct the warm spray onto your face. Using a vaporizer along with a menthol rub at night may also help soothe symptoms.   An expectorant with guaifenesin may help thin nasal mucus and help your sinuses drain fluids. Talk with your provider or pharmacists before taking an over-the-counter (OTC) medicine if you have any questions about it or its side effects..  You can use an OTC decongestant, unless a similar medicine was prescribed to you. Nasal sprays work the fastest. Use one that contains phenylephrine or oxymetazoline. First blow your nose gently. Then use the spray. Don't use these medicines more often than directed on the label. If you do, your symptoms may get worse. You may also take pills that contain pseudoephedrine. Don t use products that combine multiple medicines. This is because side effects may be increased. Read labels. You can also ask the pharmacist for help. (People with high blood pressure  should not use decongestants. They can raise blood pressure.) Talk with your provider or pharmacist if you have any questions about the medicine..  OTC antihistamines may help if allergies contributed to your sinusitis. Talk with your provider or pharmacist if you have any questions about the medicine..  Don't use nasal rinses or irrigation during an acute sinus infection, unless your healthcare provider tells you to. Rinsing may spread the infection to other areas in your sinuses.  Use acetaminophen or ibuprofen to control pain, unless another pain medicine was prescribed to you. If you have chronic liver or kidney disease or ever had a stomach ulcer, talk with your healthcare provider before using these medicines. Never give aspirin to anyone under age 18 who is ill with a fever. It may cause severe liver damage.  Don't smoke. This can make symptoms worse.    Follow-up care  Follow up with your healthcare provider, or as advised.   When to seek medical advice  Call your healthcare provider if any of these occur:   Facial pain or headache that gets worse  Stiff neck  Unusual drowsiness or confusion  Swelling of your forehead or eyelids  Symptoms don't go away in 10 days  Vision problems, such as blurred or double vision  Fever of 100.4 F (38 C) or higher, or as directed by your healthcare provider  Call 911  Call 911 if any of these occur:   Seizure  Trouble breathing  Feeling dizzy or faint  Fingernails, skin or lips look blue, purple , or gray  Prevention  Here are steps you can take to help prevent an infection:   Keep good hand washing habits.  Don t have close contact with people who have sore throats, colds, or other upper respiratory infections.  Don t smoke, and stay away from secondhand smoke.  Stay up to date with of your vaccines.  [employer] last reviewed this educational content on 12/1/2019 2000-2021 [employer]. All rights reserved. This information is not intended as a substitute for  professional medical care. Always follow your healthcare professional's instructions.        Dear Ameena Cardoza    After reviewing your responses, I've been able to diagnose you with?a sinus infection caused by bacteria.?     Based on your responses and diagnosis, I have prescribed augmentin to treat your symptoms. I have sent this to your pharmacy.?     It is also important to stay well hydrated, get lots of rest and take over-the-counter decongestants,?tylenol?or ibuprofen if you?are able to?take those medications per your primary care provider to help relieve discomfort.?     It is important that you take?all of?your prescribed medication even if your symptoms are improving after a few doses.? Taking?all of?your medicine helps prevent the symptoms from returning.?     If your symptoms worsen, you develop severe headache, vomiting, high fever (>102), or are not improving in 7 days, please contact your primary care provider for an appointment or visit any of our convenient Walk-in Care or Urgent Care Centers to be seen which can be found on our website?here.?     Thanks again for choosing?us?as your health care partner,?   ?  Lane Galo, [facility], [facility]?

## 2022-06-29 ENCOUNTER — E-VISIT (OUTPATIENT)
Dept: URGENT CARE | Facility: CLINIC | Age: 32
End: 2022-06-29

## 2022-06-29 ENCOUNTER — OFFICE VISIT (OUTPATIENT)
Dept: PEDIATRICS | Facility: CLINIC | Age: 32
End: 2022-06-29
Payer: COMMERCIAL

## 2022-06-29 ENCOUNTER — E-VISIT (OUTPATIENT)
Dept: URGENT CARE | Facility: CLINIC | Age: 32
End: 2022-06-29
Payer: COMMERCIAL

## 2022-06-29 VITALS
DIASTOLIC BLOOD PRESSURE: 60 MMHG | HEIGHT: 66 IN | RESPIRATION RATE: 16 BRPM | WEIGHT: 175 LBS | TEMPERATURE: 98 F | SYSTOLIC BLOOD PRESSURE: 102 MMHG | HEART RATE: 56 BPM | BODY MASS INDEX: 28.12 KG/M2 | OXYGEN SATURATION: 98 %

## 2022-06-29 DIAGNOSIS — E03.8 SUBCLINICAL HYPOTHYROIDISM: ICD-10-CM

## 2022-06-29 DIAGNOSIS — L30.9 DERMATITIS: ICD-10-CM

## 2022-06-29 DIAGNOSIS — H10.10 ALLERGIC CONJUNCTIVITIS, UNSPECIFIED LATERALITY: Primary | ICD-10-CM

## 2022-06-29 DIAGNOSIS — Z00.00 ROUTINE GENERAL MEDICAL EXAMINATION AT A HEALTH CARE FACILITY: Primary | ICD-10-CM

## 2022-06-29 DIAGNOSIS — Z83.49 FAMILY HISTORY OF THYROID DISEASE: ICD-10-CM

## 2022-06-29 DIAGNOSIS — H57.89 RED EYES: Primary | ICD-10-CM

## 2022-06-29 DIAGNOSIS — R73.01 ELEVATED FASTING GLUCOSE: ICD-10-CM

## 2022-06-29 PROBLEM — R73.03 PREDIABETES: Status: ACTIVE | Noted: 2022-06-29

## 2022-06-29 LAB — HBA1C MFR BLD: 5.7 % (ref 0–5.6)

## 2022-06-29 PROCEDURE — 99395 PREV VISIT EST AGE 18-39: CPT | Performed by: NURSE PRACTITIONER

## 2022-06-29 PROCEDURE — 99421 OL DIG E/M SVC 5-10 MIN: CPT

## 2022-06-29 PROCEDURE — 36415 COLL VENOUS BLD VENIPUNCTURE: CPT | Performed by: NURSE PRACTITIONER

## 2022-06-29 PROCEDURE — 84443 ASSAY THYROID STIM HORMONE: CPT | Performed by: NURSE PRACTITIONER

## 2022-06-29 PROCEDURE — 83036 HEMOGLOBIN GLYCOSYLATED A1C: CPT | Performed by: NURSE PRACTITIONER

## 2022-06-29 PROCEDURE — 99207 PR NON-BILLABLE SERV PER CHARTING: CPT

## 2022-06-29 RX ORDER — OLOPATADINE HYDROCHLORIDE 2 MG/ML
1 SOLUTION/ DROPS OPHTHALMIC DAILY
Qty: 10 ML | Refills: 0 | Status: SHIPPED | OUTPATIENT
Start: 2022-06-29 | End: 2024-01-25

## 2022-06-29 SDOH — ECONOMIC STABILITY: INCOME INSECURITY: IN THE LAST 12 MONTHS, WAS THERE A TIME WHEN YOU WERE NOT ABLE TO PAY THE MORTGAGE OR RENT ON TIME?: NO

## 2022-06-29 SDOH — ECONOMIC STABILITY: INCOME INSECURITY: HOW HARD IS IT FOR YOU TO PAY FOR THE VERY BASICS LIKE FOOD, HOUSING, MEDICAL CARE, AND HEATING?: NOT HARD AT ALL

## 2022-06-29 SDOH — ECONOMIC STABILITY: TRANSPORTATION INSECURITY
IN THE PAST 12 MONTHS, HAS LACK OF TRANSPORTATION KEPT YOU FROM MEETINGS, WORK, OR FROM GETTING THINGS NEEDED FOR DAILY LIVING?: NO

## 2022-06-29 SDOH — ECONOMIC STABILITY: FOOD INSECURITY: WITHIN THE PAST 12 MONTHS, YOU WORRIED THAT YOUR FOOD WOULD RUN OUT BEFORE YOU GOT MONEY TO BUY MORE.: NEVER TRUE

## 2022-06-29 SDOH — ECONOMIC STABILITY: FOOD INSECURITY: WITHIN THE PAST 12 MONTHS, THE FOOD YOU BOUGHT JUST DIDN'T LAST AND YOU DIDN'T HAVE MONEY TO GET MORE.: NEVER TRUE

## 2022-06-29 SDOH — ECONOMIC STABILITY: TRANSPORTATION INSECURITY
IN THE PAST 12 MONTHS, HAS THE LACK OF TRANSPORTATION KEPT YOU FROM MEDICAL APPOINTMENTS OR FROM GETTING MEDICATIONS?: NO

## 2022-06-29 SDOH — HEALTH STABILITY: PHYSICAL HEALTH: ON AVERAGE, HOW MANY DAYS PER WEEK DO YOU ENGAGE IN MODERATE TO STRENUOUS EXERCISE (LIKE A BRISK WALK)?: 3 DAYS

## 2022-06-29 SDOH — HEALTH STABILITY: PHYSICAL HEALTH: ON AVERAGE, HOW MANY MINUTES DO YOU ENGAGE IN EXERCISE AT THIS LEVEL?: 30 MIN

## 2022-06-29 ASSESSMENT — ENCOUNTER SYMPTOMS
FREQUENCY: 0
NAUSEA: 0
SHORTNESS OF BREATH: 0
COUGH: 0
BREAST MASS: 0
HEARTBURN: 0
HEMATOCHEZIA: 0
HEADACHES: 0
CONSTIPATION: 0
EYE PAIN: 0
JOINT SWELLING: 0
FEVER: 0
ARTHRALGIAS: 0
CHILLS: 0
DIZZINESS: 0
SORE THROAT: 0
ABDOMINAL PAIN: 0
DIARRHEA: 0
WEAKNESS: 0
MYALGIAS: 0
DYSURIA: 0
PARESTHESIAS: 0
HEMATURIA: 0
PALPITATIONS: 0
NERVOUS/ANXIOUS: 0

## 2022-06-29 ASSESSMENT — PAIN SCALES - GENERAL: PAINLEVEL: NO PAIN (0)

## 2022-06-29 ASSESSMENT — SOCIAL DETERMINANTS OF HEALTH (SDOH)
HOW OFTEN DO YOU GET TOGETHER WITH FRIENDS OR RELATIVES?: TWICE A WEEK
IN A TYPICAL WEEK, HOW MANY TIMES DO YOU TALK ON THE PHONE WITH FAMILY, FRIENDS, OR NEIGHBORS?: MORE THAN THREE TIMES A WEEK
HOW OFTEN DO YOU ATTEND CHURCH OR RELIGIOUS SERVICES?: NEVER
DO YOU BELONG TO ANY CLUBS OR ORGANIZATIONS SUCH AS CHURCH GROUPS UNIONS, FRATERNAL OR ATHLETIC GROUPS, OR SCHOOL GROUPS?: YES

## 2022-06-29 ASSESSMENT — LIFESTYLE VARIABLES
SKIP TO QUESTIONS 9-10: 1
AUDIT-C TOTAL SCORE: 4
HOW MANY STANDARD DRINKS CONTAINING ALCOHOL DO YOU HAVE ON A TYPICAL DAY: 1 OR 2
HOW OFTEN DO YOU HAVE SIX OR MORE DRINKS ON ONE OCCASION: NEVER
HOW OFTEN DO YOU HAVE A DRINK CONTAINING ALCOHOL: 4 OR MORE TIMES A WEEK

## 2022-06-29 NOTE — PROGRESS NOTES
SUBJECTIVE:   CC: Ameena Cardoza is an 31 year old woman who presents for preventive health visit.     Patient has been advised of split billing requirements and indicates understanding: Yes     Healthy Habits:     Getting at least 3 servings of Calcium per day:  Yes    Bi-annual eye exam:  Yes    Dental care twice a year:  Yes    Sleep apnea or symptoms of sleep apnea:  None    Diet:  Vegetarian/vegan    Frequency of exercise:  2-3 days/week    Duration of exercise:  15-30 minutes    Taking medications regularly:  Yes    Medication side effects:  None    PHQ-2 Total Score: 0    Additional concerns today:  No          Today's PHQ-2 Score:   PHQ-2 ( 1999 Pfizer) 6/29/2022   Q1: Little interest or pleasure in doing things 0   Q2: Feeling down, depressed or hopeless 0   PHQ-2 Score 0   PHQ-2 Total Score (12-17 Years)- Positive if 3 or more points; Administer PHQ-A if positive -   Q1: Little interest or pleasure in doing things Not at all   Q2: Feeling down, depressed or hopeless Not at all   PHQ-2 Score 0       Abuse: Current or Past (Physical, Sexual or Emotional) - No  Do you feel safe in your environment? Yes        Social History     Tobacco Use     Smoking status: Never Smoker     Smokeless tobacco: Never Used   Substance Use Topics     Alcohol use: Yes     Comment: glass of wine a day         Alcohol Use 6/29/2022   Prescreen: >3 drinks/day or >7 drinks/week? No   AUDIT SCORE  -       Reviewed orders with patient.  Reviewed health maintenance and updated orders accordingly - Yes  BP Readings from Last 3 Encounters:   06/29/22 102/60   06/28/21 118/82   02/20/20 124/86    Wt Readings from Last 3 Encounters:   06/29/22 79.4 kg (175 lb)   07/12/21 83 kg (183 lb)   06/28/21 83.3 kg (183 lb 9.6 oz)            Breast Cancer Screening:    FHS-7:   Breast CA Risk Assessment (FHS-7) 6/27/2021 6/29/2022   Did any of your first-degree relatives have breast or ovarian cancer? No No   Did any of your relatives have  bilateral breast cancer? No Unknown   Did any man in your family have breast cancer? No No   Did any woman in your family have breast and ovarian cancer? No Yes   Did any woman in your family have breast cancer before age 50 y? Yes Yes   Do you have 2 or more relatives with breast and/or ovarian cancer? Yes Yes   Do you have 2 or more relatives with breast and/or bowel cancer? No Yes       Patient under 40 years of age: Routine Mammogram Screening not recommended.   Pertinent mammograms are reviewed under the imaging tab.    History of abnormal Pap smear: NO - age 30-65 PAP every 5 years with negative HPV co-testing recommended  PAP / HPV Latest Ref Rng & Units 2020   PAP (Historical) - NIL   HPV16 NEG:Negative Negative   HPV18 NEG:Negative Negative   HRHPV NEG:Negative Negative     Reviewed and updated as needed this visit by clinical staff   Tobacco  Allergies               Reviewed and updated as needed this visit by Provider                   Patient Active Problem List   Diagnosis     Family history of breast cancer gene mutation in first degree relative     PCOS (polycystic ovarian syndrome)     Vitamin D deficiency - mychart     Family history of hypothyroidism     Subclinical hypothyroidism     Elevated fasting glucose     Past Medical History:   Diagnosis Date     Dislocation of right patella      Family history of breast cancer gene mutation in first degree relative     gene tested CHek 2 associated with increased risk  q6 mos mammo starting at 35      HELLP (hemolytic anemia/elev liver enzymes/low platelets in pregnancy)      PCOS (polycystic ovarian syndrome)     age 12 no treatment necessary      Preeclampsia, severe, third trimester     HTN, proteinuria, 30wks-->mag, IOL x 24 hrs->C/S     Past Surgical History:   Procedure Laterality Date      SECTION      30 wks gestation, HTN, induction->C/S,       SECTION, TUBAL LIGATION, COMBINED N/A 2020    Procedure:   SECTION, WITH POSTPARTUM TUBAL LIGATION;  Surgeon: Ana Phillips MD;  Location:  L+D     Family History   Problem Relation Age of Onset     Breast Cancer Paternal Grandmother         age 40-x 2 bouts     Hypertension Father 45     Breast Cancer Maternal Aunt         40     Breast Cancer Maternal Aunt         40     Diabetes Paternal Grandfather      Hypothyroidism Mother      Obesity Mother      No Known Problems Sister      Cerebrovascular Disease Maternal Grandmother      ALS Maternal Grandfather 38     Hypothyroidism Son      Social History     Socioeconomic History     Marital status:      Spouse name: Red     Number of children: 1     Years of education: Not on file     Highest education level: Not on file   Occupational History     Comment: advisor, undergrad, learning abroad   Tobacco Use     Smoking status: Never Smoker     Smokeless tobacco: Never Used   Vaping Use     Vaping Use: Never used   Substance and Sexual Activity     Alcohol use: Yes     Comment: glass of wine a day     Drug use: Not Currently     Sexual activity: Yes     Partners: Male     Birth control/protection: Female Surgical   Other Topics Concern     Not on file   Social History Narrative    Lives in [place]. Two kids, 5 (girl) and 1.5 years (boy). ,  is an . Working from home, works at the [employer] in [employer] [place].         6/28/21    Kelli Queen, DNP, APRN, CNP     Social Determinants of Health     Financial Resource Strain: Low Risk      Difficulty of Paying Living Expenses: Not hard at all   Food Insecurity: No Food Insecurity     Worried About Running Out of Food in the Last Year: Never true     Ran Out of Food in the Last Year: Never true   Transportation Needs: No Transportation Needs     Lack of Transportation (Medical): No     Lack of Transportation (Non-Medical): No   Physical Activity: Insufficiently Active     Days of Exercise per Week: 3 days     Minutes of Exercise per Session: 30  "min   Stress: No Stress Concern Present     Feeling of Stress : Not at all   Social Connections: Moderately Integrated     Frequency of Communication with Friends and Family: More than three times a week     Frequency of Social Gatherings with Friends and Family: Twice a week     Attends Alevism Services: Never     Active Member of Clubs or Organizations: Yes     Attends Club or Organization Meetings: Not on file     Marital Status:    Intimate Partner Violence: Not on file   Housing Stability: Low Risk      Unable to Pay for Housing in the Last Year: No     Number of Places Lived in the Last Year: 1     Unstable Housing in the Last Year: No     Current Outpatient Medications   Medication     desonide (DESOWEN) 0.05 % external ointment     No current facility-administered medications for this visit.        Allergies   Allergen Reactions     Sulfa Drugs GI Disturbance         Review of Systems   Constitutional: Negative for chills and fever.   HENT: Negative for congestion, ear pain, hearing loss and sore throat.    Eyes: Negative for pain and visual disturbance.   Respiratory: Negative for cough and shortness of breath.    Cardiovascular: Negative for chest pain, palpitations and peripheral edema.   Gastrointestinal: Negative for abdominal pain, constipation, diarrhea, heartburn, hematochezia and nausea.   Breasts:  Negative for tenderness, breast mass and discharge.   Genitourinary: Negative for dysuria, frequency, genital sores, hematuria, pelvic pain, urgency, vaginal bleeding and vaginal discharge.   Musculoskeletal: Negative for arthralgias, joint swelling and myalgias.   Skin: Negative for rash.   Neurological: Negative for dizziness, weakness, headaches and paresthesias.   Psychiatric/Behavioral: Negative for mood changes. The patient is not nervous/anxious.         OBJECTIVE:   /60   Pulse 56   Temp 98  F (36.7  C) (Tympanic)   Resp 16   Ht 1.676 m (5' 6\")   Wt 79.4 kg (175 lb)   LMP " 06/13/2022   SpO2 98%   BMI 28.25 kg/m    Physical Exam  Constitutional: appears to be in no acute distress, comfortable, pleasant.   Eyes: anicteric, conjunctiva clear without drainage or erythema. BREANN.   Ears, Nose and Throat: tympanic membranes gray with LR,  nose without nasal discharge. OP: no erythema to posterior pharynx, negative post nasal drainage, tonsils +1 no erythema or exudate.  Neck: supple, thyroid palpable,not enlarged, no nodules   Breast: Exam deferred (deferred after discussion of exam options with patient, no symptoms or concerns).   Cardiovascular: regular rate and rhythm, normal S1 and S2, no murmurs, rubs or gallops, peripheral pulses full and symmetric; negative peripheral edema   Respiratory: Air entry throughout. Breathing pattern unlabored without the use of accessory muscles. Clear to auscultation A and P, no wheezes or crackles, normal breath sounds.    Gastrointestinal: rounded, soft. Positive bowel sounds x4, nontender, no masses.   Genitourinary: Exam deferred (deferred after discussion of exam options with patient, no symptoms or concerns, pap up to date).   Musculoskeletal: full range of motion, no edema.   Skin: pink, turgor smooth and elastic. Negative for lesions or dryness.  Neurological: normal gait, no tremor.   Psychological: appropriate mood and affect.   Lymphatic: no cervical, axillary, supraclavicular, or infraclavicular lymphadenopathy.    Diagnostic Test Results:  Labs reviewed in Epic    ASSESSMENT/PLAN:   (Z00.00) Routine general medical examination at a health care facility  (primary encounter diagnosis)  Age appropriate screening and preventative care have been addressed today. Vaccinations have been reviewed and are up to date. Patient has been advised to undertake routine aerobic activity. Recommend annual vision exams as well as biannual dental exams. They will follow up for annual physical again in one year.     (Z83.49) Family history of thyroid  "disease  (E03.8) Subclinical hypothyroidism  - TSH with free T4 reflex          (R73.01) Elevated fasting glucose  - Hemoglobin A1c          (L30.9) Dermatitis  Continue to follow with dermatology.      Follow-up: Lab results pending, will follow-up as indicated after reviewing results.       COUNSELING:  Reviewed preventive health counseling, as reflected in patient instructions  Special attention given to:        Regular exercise       Vision screening       Immunizations      Estimated body mass index is 28.25 kg/m  as calculated from the following:    Height as of this encounter: 1.676 m (5' 6\").    Weight as of this encounter: 79.4 kg (175 lb).    Weight management plan: Discussed healthy diet and exercise guidelines    She reports that she has never smoked. She has never used smokeless tobacco.      Counseling Resources:  ATP IV Guidelines  Pooled Cohorts Equation Calculator  Breast Cancer Risk Calculator  BRCA-Related Cancer Risk Assessment: FHS-7 Tool  FRAX Risk Assessment  ICSI Preventive Guidelines  Dietary Guidelines for Americans, 2010  USDA's MyPlate  ASA Prophylaxis  Lung CA Screening    HERNAN Pelayo CNP  [facility]  "

## 2022-06-30 LAB — TSH SERPL DL<=0.005 MIU/L-ACNC: 3.1 MU/L (ref 0.4–4)

## 2022-06-30 NOTE — PATIENT INSTRUCTIONS
Thank you for choosing us for your care. I have placed an order for a prescription so that you can start treatment. View your full visit summary for details by clicking on the link below. Your pharmacist will able to address any questions you may have about the medication.     If you re not feeling better within 2-3 days, please schedule an appointment.  You can schedule an appointment right here in [facility], or call [phone]  If the visit is for the same symptoms as your eVisit, we ll refund the cost of your eVisit if seen within seven days.      Conjunctivitis, Allergic    Conjunctivitis is an irritation of the thin membrane covering the eye and the inside of the eyelid. This membrane is called the conjunctiva. The condition is often called pink eye or red eye because the eye looks pink or red. The eye may also be swollen, itchy, burning, or tearing. A watery or mucous discharge may occur. This type of conjunctivitis is not contagious.   Allergic conjunctivitis is caused by an allergen. Allergens are substances that cause the body to react with certain symptoms. Allergens that cause eye irritation include things such as house dust, smoke, or pollen in the air. This can occur seasonally, most often in the spring. Other possible allergens that may cause symptoms include cosmetics, perfumes, animal saliva or dander, chlorine in swimming pools, or contact lens.   Home care    Eye drops may be prescribed to reduce itching and redness. Use these as directed. Otherwise, over-the-counter lubricating eye drops, sometimes referred to as artificial tears, may be used.    Apply a cool compress (towel soaked in cool water) to the affected eye 3 to 4 times a day to reduce swelling and itching.    It's common to have mucus drainage during the night, causing the eyelids to become crusted by morning. Use a warm, wet cloth to wipe this away. You may also use saline irrigating solution or artificial tears to rinse away mucus in  the eye. Don't patch the eye.    You may use acetaminophen or ibuprofen to control pain, unless another medicine was prescribed. Talk with your healthcare provider if you have chronic liver or kidney disease before using these medicines. Also talk with your provider if you have ever had a stomach ulcer or digestive bleeding.    Don't wear contact lenses until your eyes have healed and all symptoms are gone.    Follow-up care  Follow up with your healthcare provider, or as advised.  When to seek medical advice  Call your healthcare provider or seek medical care right away if any of these occur:     Increased eyelid swelling    New or worsening drainage from the eye    Increasing redness around the eye    Facial swelling  StayWell last reviewed this educational content on 4/1/2020 2000-2021 [employer]. All rights reserved. This information is not intended as a substitute for professional medical care. Always follow your healthcare professional's instructions.

## 2022-06-30 NOTE — PATIENT INSTRUCTIONS
Dear Ameena Cardoza,    We are sorry you are not feeling well. Based on the responses you provided, it is recommended that you be seen in-person in urgent care so we can better evaluate your symptoms. Please click here to find the nearest urgent care location to you.   You will not be charged for this Visit. Thank you for trusting us with your care.    Briana Martinez, CNP

## 2022-07-07 ENCOUNTER — MYC MEDICAL ADVICE (OUTPATIENT)
Dept: PEDIATRICS | Facility: CLINIC | Age: 32
End: 2022-07-07

## 2022-09-18 ENCOUNTER — HEALTH MAINTENANCE LETTER (OUTPATIENT)
Age: 32
End: 2022-09-18

## 2023-05-30 ENCOUNTER — PATIENT OUTREACH (OUTPATIENT)
Dept: CARE COORDINATION | Facility: CLINIC | Age: 33
End: 2023-05-30
Payer: COMMERCIAL

## 2023-06-13 ENCOUNTER — PATIENT OUTREACH (OUTPATIENT)
Dept: CARE COORDINATION | Facility: CLINIC | Age: 33
End: 2023-06-13
Payer: COMMERCIAL

## 2023-07-30 ENCOUNTER — HEALTH MAINTENANCE LETTER (OUTPATIENT)
Age: 33
End: 2023-07-30

## 2024-01-25 ENCOUNTER — OFFICE VISIT (OUTPATIENT)
Dept: PEDIATRICS | Facility: CLINIC | Age: 34
End: 2024-01-25
Payer: COMMERCIAL

## 2024-01-25 VITALS
BODY MASS INDEX: 30.05 KG/M2 | OXYGEN SATURATION: 98 % | TEMPERATURE: 97.9 F | RESPIRATION RATE: 16 BRPM | HEART RATE: 68 BPM | DIASTOLIC BLOOD PRESSURE: 60 MMHG | WEIGHT: 187 LBS | HEIGHT: 66 IN | SYSTOLIC BLOOD PRESSURE: 104 MMHG

## 2024-01-25 DIAGNOSIS — E66.811 CLASS 1 OBESITY DUE TO EXCESS CALORIES WITHOUT SERIOUS COMORBIDITY WITH BODY MASS INDEX (BMI) OF 30.0 TO 30.9 IN ADULT: ICD-10-CM

## 2024-01-25 DIAGNOSIS — E28.2 PCOS (POLYCYSTIC OVARIAN SYNDROME): ICD-10-CM

## 2024-01-25 DIAGNOSIS — E03.8 SUBCLINICAL HYPOTHYROIDISM: ICD-10-CM

## 2024-01-25 DIAGNOSIS — R73.03 PREDIABETES: ICD-10-CM

## 2024-01-25 DIAGNOSIS — E66.09 CLASS 1 OBESITY DUE TO EXCESS CALORIES WITHOUT SERIOUS COMORBIDITY WITH BODY MASS INDEX (BMI) OF 30.0 TO 30.9 IN ADULT: ICD-10-CM

## 2024-01-25 DIAGNOSIS — Z00.00 ROUTINE GENERAL MEDICAL EXAMINATION AT A HEALTH CARE FACILITY: Primary | ICD-10-CM

## 2024-01-25 LAB — HBA1C MFR BLD: 5.9 % (ref 0–5.6)

## 2024-01-25 PROCEDURE — 99395 PREV VISIT EST AGE 18-39: CPT | Performed by: NURSE PRACTITIONER

## 2024-01-25 PROCEDURE — 36415 COLL VENOUS BLD VENIPUNCTURE: CPT | Performed by: NURSE PRACTITIONER

## 2024-01-25 PROCEDURE — 83036 HEMOGLOBIN GLYCOSYLATED A1C: CPT | Performed by: NURSE PRACTITIONER

## 2024-01-25 PROCEDURE — 84443 ASSAY THYROID STIM HORMONE: CPT | Performed by: NURSE PRACTITIONER

## 2024-01-25 ASSESSMENT — ENCOUNTER SYMPTOMS
HEMATURIA: 0
HEADACHES: 0
BREAST MASS: 0
JOINT SWELLING: 0
EYE PAIN: 0
SORE THROAT: 0
ABDOMINAL PAIN: 0
FREQUENCY: 0
MYALGIAS: 0
DYSURIA: 0
HEMATOCHEZIA: 0
NERVOUS/ANXIOUS: 0
PARESTHESIAS: 0
DIZZINESS: 0
HEARTBURN: 0
PALPITATIONS: 0
SHORTNESS OF BREATH: 0
ARTHRALGIAS: 0
NAUSEA: 0
WEAKNESS: 0
CONSTIPATION: 0
CHILLS: 0
DIARRHEA: 0
COUGH: 0
FEVER: 0

## 2024-01-25 ASSESSMENT — PAIN SCALES - GENERAL: PAINLEVEL: NO PAIN (0)

## 2024-01-25 NOTE — PROGRESS NOTES
Preventive Care Visit  [facility] HERNAN Renteria CNP, Family Medicine  Jan 25, 2024       SUBJECTIVE:   Marcia is a 33 year old, presenting for the following:  Physical        1/25/2024     1:52 PM   Additional Questions   Roomed by Kellee REESE   Accompanied by Self         1/25/2024     1:52 PM   Patient Reported Additional Medications   Patient reports taking the following new medications n/a     Healthy Habits:     Getting at least 3 servings of Calcium per day:  Yes    Bi-annual eye exam:  Yes    Dental care twice a year:  Yes    Sleep apnea or symptoms of sleep apnea:  None    Diet:  Regular (no restrictions)    Frequency of exercise:  2-3 days/week    Duration of exercise:  15-30 minutes    Taking medications regularly:  Yes    Medication side effects:  Not applicable    Additional concerns today:  No            Today's PHQ-2 Score:       1/25/2024     8:19 AM   PHQ-2 ( 1999 Pf[employer])   Q1: Little interest or pleasure in doing things 0   Q2: Feeling down, depressed or hopeless 0   PHQ-2 Score 0   Q1: Little interest or pleasure in doing things Not at all   Q2: Feeling down, depressed or hopeless Not at all   PHQ-2 Score 0         Social History     Tobacco Use    Smoking status: Never    Smokeless tobacco: Never   Substance Use Topics    Alcohol use: Yes     Comment: glass of wine a day             1/25/2024     8:19 AM   Alcohol Use   Prescreen: >3 drinks/day or >7 drinks/week? No     Reviewed orders with patient.  Reviewed health maintenance and updated orders accordingly - Yes  BP Readings from Last 3 Encounters:   01/25/24 104/60   06/29/22 102/60   06/28/21 118/82    Wt Readings from Last 3 Encounters:   01/25/24 84.8 kg (187 lb)   06/29/22 79.4 kg (175 lb)   07/12/21 83 kg (183 lb)         Breast Cancer Screening:    FHS-7:       6/27/2021    10:10 PM 6/29/2022    10:44 AM 1/25/2024     8:24 AM   Breast CA Risk Assessment (FHS-7)   Did any of your first-degree relatives have breast  or ovarian cancer? No No No   Did any of your relatives have bilateral breast cancer? No Unknown Yes   Did any man in your family have breast cancer? No No No   Did any woman in your family have breast and ovarian cancer? No Yes Yes   Did any woman in your family have breast cancer before age 50 y? Yes Yes Yes   Do you have 2 or more relatives with breast and/or ovarian cancer? Yes Yes Yes   Do you have 2 or more relatives with breast and/or bowel cancer? No Yes Yes       Patient under 40 years of age: Routine Mammogram Screening not recommended.   Pertinent mammograms are reviewed under the imaging tab.    History of abnormal Pap smear: NO - age 30-65 PAP every 5 years with negative HPV co-testing recommended      Latest Ref Rng & Units 2/20/2020    10:51 AM 2/20/2020    10:30 AM   PAP / HPV   PAP (Historical)  NIL     HPV 16 DNA NEG^Negative  Negative    HPV 18 DNA NEG^Negative  Negative    Other HR HPV NEG^Negative  Negative      Reviewed and updated as needed this visit by clinical staff   Tobacco  Allergies  Meds              Reviewed and updated as needed this visit by Provider                  Patient Active Problem List   Diagnosis    Family history of breast cancer gene mutation in first degree relative    PCOS (polycystic ovarian syndrome)    Vitamin D deficiency - mychart    Family history of hypothyroidism    Subclinical hypothyroidism    Elevated fasting glucose    Dermatitis    Prediabetes     Past Medical History:   Diagnosis Date    Cancer (H)     Paternal Grandmother, Maternal Aunts    Cerebral infarction (H) Jan 2019    Maternal Grandmother    Dislocation of right patella     Family history of breast cancer gene mutation in first degree relative     gene tested CHek 2 associated with increased risk  q6 mos mammo starting at 33     HELLP (hemolytic anemia/elev liver enzymes/low platelets in pregnancy)     PCOS (polycystic ovarian syndrome)     age 12 no treatment necessary     Preeclampsia,  severe, third trimester 2016    HTN, proteinuria, 30wks-->mag, IOL x 24 hrs->C/S    Thyroid disease     Mother     Past Surgical History:   Procedure Laterality Date     SECTION      30 wks gestation, HTN, induction->C/S,      SECTION, TUBAL LIGATION, COMBINED N/A 2020    Procedure:  SECTION, WITH POSTPARTUM TUBAL LIGATION;  Surgeon: Ana Phillips MD;  Location:  L+D     Family History   Problem Relation Age of Onset    Breast Cancer Paternal Grandmother         age 40-x 2 bouts    Hypertension Father 45    Diabetes Father     Breast Cancer Maternal Aunt         40    Breast Cancer Maternal Aunt         40    Diabetes Paternal Grandfather     Hypothyroidism Mother     Obesity Mother     Thyroid Disease Mother     No Known Problems Sister     Cerebrovascular Disease Maternal Grandmother     ALS Maternal Grandfather 38    Hypothyroidism Son     Breast Cancer Other      Social History     Socioeconomic History    Marital status:      Spouse name: Red    Number of children: 1    Years of education: Not on file    Highest education level: Not on file   Occupational History     Comment: advisor, undergrad, learning abroad   Tobacco Use    Smoking status: Never    Smokeless tobacco: Never   Vaping Use    Vaping Use: Never used   Substance and Sexual Activity    Alcohol use: Yes     Comment: glass of wine a day    Drug use: Not Currently    Sexual activity: Yes     Partners: Male     Birth control/protection: Female Surgical     Comment: Had a tubal ligation in    Other Topics Concern    Parent/sibling w/ CABG, MI or angioplasty before 65F 55M? No   Social History Narrative    Lives in [facility]. Two kids, 5 (girl, Juniper) and 1.5 years (boy, Taylor). ,  is an . Working from home, works at the [employer] in [place].         Got back from [employer] last week.         19    Kelli Queen, DNP, APRN, CNP     Social Determinants of Health      Financial Resource Strain: Low Risk  (1/25/2024)    Financial Resource Strain     Within the past 12 months, have you or your family members you live with been unable to get utilities (heat, electricity) when it was really needed?: No   Food Insecurity: Low Risk  (1/25/2024)    Food Insecurity     Within the past 12 months, did you worry that your food would run out before you got money to buy more?: No     Within the past 12 months, did the food you bought just not last and you didn t have money to get more?: No   Transportation Needs: Low Risk  (1/25/2024)    Transportation Needs     Within the past 12 months, has lack of transportation kept you from medical appointments, getting your medicines, non-medical meetings or appointments, work, or from getting things that you need?: No   Physical Activity: Insufficiently Active (6/29/2022)    Exercise Vital Sign     Days of Exercise per Week: 3 days     Minutes of Exercise per Session: 30 min   Stress: No Stress Concern Present (6/29/2022)    [place] citizen [place] of Occupational [employer] - Occupational Stress Questionnaire     Feeling of Stress : Not at all   Social Connections: Moderately Integrated (6/29/2022)    Social Connection and Isolation Panel [NHANES]     Frequency of Communication with Friends and Family: More than three times a week     Frequency of Social Gatherings with Friends and Family: Twice a week     Attends Yazidi Services: Never     Active Member of Clubs or Organizations: Yes     Attends Club or Organization Meetings: Not on file     Marital Status:    Interpersonal Safety: Low Risk  (1/25/2024)    Interpersonal Safety     Do you feel physically and emotionally safe where you currently live?: Yes     Within the past 12 months, have you been hit, slapped, kicked or otherwise physically hurt by someone?: No     Within the past 12 months, have you been humiliated or emotionally abused in other ways by your partner or ex-partner?: No  "  Housing Stability: High Risk (1/25/2024)    Housing Stability     Do you have housing? : No     Are you worried about losing your housing?: No     Current Outpatient Medications   Medication    desonide (DESOWEN) 0.05 % external ointment     No current facility-administered medications for this visit.        Allergies   Allergen Reactions    Amoxicillin-Pot Clavulanate      Severe vaginal candidiasis and shingles.     Sulfa Antibiotics GI Disturbance       Review of Systems   Constitutional:  Negative for chills and fever.   HENT:  Negative for congestion, ear pain, hearing loss and sore throat.    Eyes:  Negative for pain and visual disturbance.   Respiratory:  Negative for cough and shortness of breath.    Cardiovascular:  Negative for chest pain and palpitations.   Gastrointestinal:  Negative for abdominal pain, constipation, diarrhea and nausea.   Genitourinary:  Negative for dysuria, frequency, genital sores, hematuria, pelvic pain, urgency, vaginal bleeding and vaginal discharge.   Musculoskeletal:  Negative for arthralgias, joint swelling and myalgias.   Skin:  Negative for rash.   Neurological:  Negative for dizziness, weakness and headaches.   Psychiatric/Behavioral:  The patient is not nervous/anxious.        OBJECTIVE:   /60   Pulse 68   Temp 97.9  F (36.6  C) (Tympanic)   Resp 16   Ht 1.664 m (5' 5.5\")   Wt 84.8 kg (187 lb)   LMP 12/27/2023   SpO2 98%   Breastfeeding No   BMI 30.65 kg/m     Estimated body mass index is 30.65 kg/m  as calculated from the following:    Height as of this encounter: 1.664 m (5' 5.5\").    Weight as of this encounter: 84.8 kg (187 lb).  Physical Exam  Constitutional: appears to be in no acute distress, comfortable, pleasant.   Eyes: anicteric, conjunctiva clear without drainage or erythema. BREANN.   Ears, Nose and Throat: tympanic membranes gray with LR,  nose without nasal discharge. OP: no erythema to posterior pharynx, negative post nasal drainage, tonsils " +1 no erythema or exudate.  Neck: supple, thyroid palpable,not enlarged, no nodules   Breast: Exam deferred (deferred after discussion of exam options with patient, no symptoms or concerns).   Cardiovascular: regular rate and rhythm, normal S1 and S2, no murmurs, rubs or gallops, peripheral pulses full and symmetric; negative peripheral edema   Respiratory: Air entry throughout. Breathing pattern unlabored without the use of accessory muscles. Clear to auscultation A and P, no wheezes or crackles, normal breath sounds.    Gastrointestinal: rounded, soft. Positive bowel sounds x4, nontender, no masses.   Genitourinary: Exam deferred (deferred after discussion of exam options with patient, no symptoms or concerns, pap up to date).   Musculoskeletal: full range of motion, no edema.   Skin: pink, turgor smooth and elastic. Negative for lesions or dryness.  Neurological: normal gait, no tremor.   Psychological: appropriate mood and affect.   Lymphatic: no cervical, axillary, supraclavicular, or infraclavicular lymphadenopathy.    Diagnostic Test Results:  Labs reviewed in Epic    ASSESSMENT/PLAN:   Routine general medical examination at a health care facility  Age appropriate screening and preventative care have been addressed today. Vaccinations have been reviewed, she declines flu, covid, and hep B. Patient has been advised to follow a balanced diet. They have been advised to undertake routine aerobic activity and they were counseled on healthy weight maintenance. Recommend annual vision exams as well as biannual dental exams. They will follow up for annual physical again in one year.   - PRIMARY CARE FOLLOW-UP SCHEDULING    Class 1 obesity due to excess calories without serious comorbidity with body mass index (BMI) of 30.0 to 30.9 in adult  Body mass index is 30.65 kg/m . Reviewed goal for regular exercise.   - Lipid panel reflex to direct LDL Fasting    Prediabetes  - Hemoglobin A1c    PCOS (polycystic ovarian  syndrome)      Subclinical hypothyroidism  TSH normal last year. If TSH high, check TPO antibodies.   - TSH with free T4 reflex            Counseling  Reviewed preventive health counseling, as reflected in patient instructions  Special attention given to:        Regular exercise       Healthy diet/nutrition       Vision screening       Immunizations      She reports that she has never smoked. She has never used smokeless tobacco.          Signed Electronically by: HERNAN Pelayo CNP

## 2024-01-25 NOTE — COMMUNITY RESOURCES LIST (ENGLISH)
01/25/2024   [facility]  [facility]/A  For questions about this resource list or additional care needs, please contact your primary care clinic or care manager.  Phone: [phone]   Email: N/A   Address: [facility] [place] [address]   Hours: N/A        Hotlines and Helplines       Hotline - Housing crisis  1  [facility] Distance: 10.52 miles      Phone/Virtual   [address] [place] [address]  Language: English  Hours: Mon - Sun Open 24 Hours   Phone: (135) 982-6015 Email: communications@[facility].org Website: https://[facility].org/[facility]avioursh[facility]/     2  [facility] (Main Office) Distance: 10.66 miles      Phone/Virtual   [address] [place] [address]  Language: English  Hours: Mon - Sun Open 24 Hours   Phone: (812) 447-6587 Email: info@[facility].org Website: http://[facility].org          Housing       Coordinated Entry access point  3  [facility] Distance: 3.16 miles      In-Person, Phone/Virtual   [address] [place] [address]  Language: English  Hours: Mon - Fri 8:30 AM - 4:30 PM  Fees: Free   Phone: (531) 912-5934 Email: info@[facility].org Website: https://www.[facility].org/locations/[facility]/     4  [facility] ([facility] - Housing Services Distance: 10.65 miles      In-Person   [address] [place] [address]  Language: English  Hours: Mon - Fri 9:00 AM - 5:00 PM  Fees: Free   Phone: (146) 129-9796 Email: housing@[facility].org Website: http://www.[facility].org/housing     Drop-in center or day shelter  5  [facility] Distance: 3.24 miles      In-Person   [address] [place] [address]  Language: English  Hours: Mon - Fri 9:00 AM - 4:00 PM  Fees: Free   Phone: (645) 743-9170 Email: maria victoria@[employer].org Website: http://[facility].org     6  [facility] Distance: 10.69 miles       In-Person   [address] [place] [address]  Language: English, Thai, Wallisian  Hours: Mon - Sat 7:00 AM - 3:00 PM  Fees: Free, Self Pay   Phone: (232) 313-8797 Email: info@[employer] Website: https://www.[employer].com.[employer]/locations/opportunity-center/     Housing search assistance  7  [facility] and Disability Services Distance: 2.49 miles      In-Person   [address] [place] [address]  Language: English  Hours: Mon - Fri 8:00 AM - 4:00 PM  Fees: Free, Insurance, Sliding Fee   Phone: (225) 254-6903 Email: getachew@[facility] Website: https://www.[facility]./HealthFamily/Disabilities     8  [facility] - Online Housing Search Assistance Distance: 4.32 miles      Phone/Virtual   [address] [place] [address]  Language: English  Hours: Mon - Sun Open 24 Hours  Fees: Free   Phone: (320) 859-3614 Email: richard@[employer] Website: https://[employer]/     Shelter for families  9  [facility] Distance: 6.84 miles      In-Person   [address] [place] [address]  Language: English  Hours: Mon - Sun Open 24 Hours  Fees: Free, Sliding Fee   Phone: (299) 792-7996 Ext.1 Email: info@[facility].[employer] Website: http://www.[employer]     10  [facility] Distance: 17.14 miles      In-Person   [address] [place] [address]  Language: English  Hours: Mon - Fri 3:00 PM - 9:00 AM , Sat - Sun Open 24 Hours  Fees: Free   Phone: (744) 567-5074 Ext.1 Website: https://www.[facility].org/2020/07/03/emergency-family-shelter/     Shelter for individuals  [address] [place] [address] Housing - Housing Help Distance: 2.94 miles      Phone/Virtual   [address] [facility] [address] [place] [address]  Language: English  Hours: Mon - Fri 8:00 AM - 5:00 PM   Phone: (681) 314-4122 Email: kayla@[facility].mn. Website: https://[facility].org/     12  [facility]  [facility] [place] [facility] [place] [facility] Distance: 3.2 miles      In-Person   [address] [place] [address]  Language: English  Hours: Mon - Sun 5:00 PM - 10:00 AM  Fees: Free, Self Pay   Phone: (963) 228-3822 Email: info@[employer] Website: https://www.[employer]/locations/[facility]/          Important Numbers & Websites       Emergency Services   911  [facility] Services   311  Poison Control   (783) 276-7648  Suicide Prevention Lifeline   (944) 664-3413 (TALK)  Child Abuse Hotline   (696) 672-8519 (4-A-Child)  Sexual Assault Hotline   (487) 360-6811 (HOPE)  National Runaway Safeline   (711) 722-6473 (RUNAWAY)  All-Options Talkline   (257) 418-5289  Substance Abuse Referral   (879) 499-8194 (HELP)

## 2024-01-26 LAB — TSH SERPL DL<=0.005 MIU/L-ACNC: 3.74 UIU/ML (ref 0.3–4.2)

## 2024-03-01 ENCOUNTER — NURSE TRIAGE (OUTPATIENT)
Dept: PEDIATRICS | Facility: CLINIC | Age: 34
End: 2024-03-01
Payer: COMMERCIAL

## 2024-03-01 DIAGNOSIS — U07.1 INFECTION DUE TO 2019 NOVEL CORONAVIRUS: Primary | ICD-10-CM

## 2024-03-01 NOTE — TELEPHONE ENCOUNTER
Nurse Triage SBAR    Is this a 2nd Level Triage? NO    Situation: Positive Covid test     Background: symptom on set 2/29, tested positive 1/3/24. No known exposure. Pt interested in Paxlovid     Assessment:   Chills, muscle ache, fatigue, headache.  Denies fever, CP, SOB, difficulty breathing.   Denies   Protocol Recommended Disposition:   Discuss With PCP And Callback By Nurse Within 1 Hour    Recommendation: routing to COVID team for Paxlovid eligibility.      Reviewed care advice under care tab with pt   Instructed pt to call back if new or worsening symptoms.   Patient was given an opportunity to ask questions, verbalized understanding of plan, and is agreeable.     Does the patient meet one of the following criteria for ADS visit consideration? NoReason for Disposition   HIGH RISK patient (e.g., weak immune system, age > 64 years, obesity with BMI of 30 or higher, pregnant, chronic lung disease or other chronic medical condition) and COVID symptoms (e.g., cough, fever)  (Exceptions: Already seen by doctor or NP/PA and no new or worsening symptoms.)    Additional Information   Negative: SEVERE difficulty breathing (e.g., struggling for each breath, speaks in single words)   Negative: Difficult to awaken or acting confused (e.g., disoriented, slurred speech)   Negative: Bluish (or gray) lips or face now   Negative: Shock suspected (e.g., cold/pale/clammy skin, too weak to stand, low BP, rapid pulse)   Negative: Sounds like a life-threatening emergency to the triager   Negative: Diagnosed or suspected COVID-19 and symptoms lasting 3 or more weeks   Negative: COVID-19 exposure and no symptoms   Negative: COVID-19 vaccine reaction suspected (e.g., fever, headache, muscle aches) occurring 1 to 3 days after getting vaccine   Negative: COVID-19 vaccine, questions about   Negative: Lives with someone known to have influenza (flu test positive) and flu-like symptoms (e.g., cough, runny nose, sore throat, SOB; with  "or without fever)   Negative: COVID-19 and breastfeeding, questions about   Negative: Possible COVID-19 symptoms and triager concerned about severity of symptoms or other causes   Negative: SEVERE or constant chest pain or pressure  (Exception: Mild central chest pain, present only when coughing.)   Negative: MODERATE difficulty breathing (e.g., speaks in phrases, SOB even at rest, pulse 100-120)   Negative: Headache and stiff neck (can't touch chin to chest)   Negative: Oxygen level (e.g., pulse oximetry) 90% or lower   Negative: Chest pain or pressure  (Exception: MILD central chest pain, present only when coughing.)   Negative: Drinking very little and dehydration suspected (e.g., no urine > 12 hours, very dry mouth, very lightheaded)   Negative: Patient sounds very sick or weak to the triager   Negative: MILD difficulty breathing (e.g., minimal/no SOB at rest, SOB with walking, pulse <100)   Negative: Fever > 103 F (39.4 C)   Negative: Fever > 101 F (38.3 C) and over 60 years of age   Negative: Fever > 100.0 F (37.8 C) and bedridden (e.g., CVA, chronic illness, recovering from surgery)    Answer Assessment - Initial Assessment Questions  1. COVID-19 DIAGNOSIS: \"How do you know that you have COVID?\" (e.g., positive lab test or self-test, diagnosed by doctor or NP/PA, symptoms after exposure).      Home test today this morning 3/1  2. COVID-19 EXPOSURE: \"Was there any known exposure to COVID before the symptoms began?\" CDC Definition of close contact: within 6 feet (2 meters) for a total of 15 minutes or more over a 24-hour period.       no  3. ONSET: \"When did the COVID-19 symptoms start?\"       2/29  4. WORST SYMPTOM: \"What is your worst symptom?\" (e.g., cough, fever, shortness of breath, muscle aches)      headache  5. COUGH: \"Do you have a cough?\" If Yes, ask: \"How bad is the cough?\"        no  6. FEVER: \"Do you have a fever?\" If Yes, ask: \"What is your temperature, how was it measured, and when did it " "start?\"      Haven't check  7. RESPIRATORY STATUS: \"Describe your breathing?\" (e.g., normal; shortness of breath, wheezing, unable to speak)       no  8. BETTER-SAME-WORSE: \"Are you getting better, staying the same or getting worse compared to yesterday?\"  If getting worse, ask, \"In what way?\"      worse  9. OTHER SYMPTOMS: \"Do you have any other symptoms?\"  (e.g., chills, fatigue, headache, loss of smell or taste, muscle pain, sore throat)      Chills, fatigue, headache, muscle pain  10. HIGH RISK DISEASE: \"Do you have any chronic medical problems?\" (e.g., asthma, heart or lung disease, weak immune system, obesity, etc.)        no  11. VACCINE: \"Have you had the COVID-19 vaccine?\" If Yes, ask: \"Which one, how many shots, when did you get it?\"        yes  12. PREGNANCY: \"Is there any chance you are pregnant?\" \"When was your last menstrual period?\"        No, currently on period   13. O2 SATURATION MONITOR:  \"Do you use an oxygen saturation monitor (pulse oximeter) at home?\" If Yes, ask \"What is your reading (oxygen level) today?\" \"What is your usual oxygen saturation reading?\" (e.g., 95%)        no    Protocols used: Coronavirus (COVID-19) Diagnosed or [id number]    Scarlett Gant RN    "

## 2024-03-01 NOTE — TELEPHONE ENCOUNTER
RN COVID TREATMENT VISIT  03/01/24    The patient has been triaged and does not require a higher level of care.    Ameena Cardoza  33 year old  Current weight? 187 lb    Has the patient been seen by a primary care provider at an [facility] or [facility] within the past two years? Yes.   Have you been in close proximity to/do you have a known exposure to a person with a confirmed case of influenza? No.     General treatment eligibility:  Date of positive COVID test (PCR or at home)?  3/1/24    Are you or have you been hospitalized for this COVID-19 infection? No.   Have you received monoclonal antibodies or antiviral treatment for COVID-19 since this positive test? No.   Do you have any of the following conditions that place you at risk of being very sick from COVID-19?   - Overweight or Obesity (BMI >85th percentile or BMI 25 or higher)  Yes, patient has at least one high risk condition as noted above.     Current COVID symptoms:   - fever or chills  - fatigue  - muscle or body aches  - headache  Yes. Patient has at least one symptom as selected.     How many days since symptoms started? 5 days or less. Established patient, 12 years or older weighing at least 88.2 lbs, who has symptoms that started in the past 5 days, has not been hospitalized nor received treatment already, and is at risk for being very sick from COVID-19.     Treatment eligibility by RN:  Are you currently pregnant or nursing? No  Do you have a clinically significant hypersensitivity to nirmatrelvir or ritonavir, or toxic epidermal necrolysis (TEN) or Kinney-Sony Syndrome? No  Do you have a history of hepatitis, any hepatic impairment on the Problem List (such as Child-Leach Class C, cirrhosis, fatty liver disease, alcoholic liver disease), or was the last liver lab (hepatic panel, ALT, AST, ALK Phos, bilirubin) elevated in the past 6 months? No  Do you have any history of severe renal impairment (eGFR <  30mL/min)? No    Is patient eligible to continue? Yes, patient meets all eligibility requirements for the RN COVID treatment (as denoted by all no responses above).     Current Outpatient Medications   Medication Sig Dispense Refill    desonide (DESOWEN) 0.05 % external ointment          Medications from List 1 of the standing order (on medications that exclude the use of Paxlovid) that patient is taking: NONE. Is patient taking Veda's Wort? No  Is patient taking [place]'s Wort or any meds from List 1? No.   Medications from List 2 of the standing order (on meds that provider needs to adjust) that patient is taking: NONE. Is patient on any of the meds from List 2? No.   Medications from List 3 of standing order (on meds that a RN needs to adjust) that patient is taking: NONE. Is patient on any meds from List 3? No.     Paxlovid has an approximate 90% reduction in hospitalization. Paxlovid can possibly cause altered sense of taste, diarrhea (loose, watery stools), high blood pressure, muscle aches.     Would patient like a Paxlovid prescription?   Yes.   Lab Results   Component Value Date    GFRESTIMATED >90 01/16/2020       Was last eGFR reduced? No, eGFR 60 or greater/ No Result on record. Patient can receive the normal renal function dose. Paxlovid Rx sent to [facility]   [facility]    Temporary change to home medications: None    All medication adjustments (holds, etc) were discussed with the patient and patient was asked to repeat back (teachback) their med adjustment.  Did patient understand med adjustment? No medication adjustments needed.         Reviewed the following instructions with the patient:    Paxlovid (nimatrelvir and ritonavir)    How it works  Two medicines (nirmatrelvir and ritonavir) are taken together. They stop the virus from growing. Less amount of virus is easier for your body to fight.    How to take  Medicine comes in a daily container with both medicine tablets. Take by mouth  twice daily (once in the morning, once at night) for 5 days.  The number of tablets to take varies by patient.  Don't chew or break capsules. Swallow whole.    When to take  Take as soon as possible after positive COVID-19 test result, and within 5 days of your first symptoms.    Possible side effects  Can cause altered sense of taste, diarrhea (loose, watery stools), high blood pressure, muscle aches.    Agnes Keller RN

## 2024-03-18 ENCOUNTER — OFFICE VISIT (OUTPATIENT)
Dept: FAMILY MEDICINE | Facility: CLINIC | Age: 34
End: 2024-03-18
Payer: COMMERCIAL

## 2024-03-18 VITALS
HEART RATE: 67 BPM | WEIGHT: 189 LBS | RESPIRATION RATE: 16 BRPM | OXYGEN SATURATION: 97 % | DIASTOLIC BLOOD PRESSURE: 64 MMHG | HEIGHT: 66 IN | TEMPERATURE: 98.1 F | SYSTOLIC BLOOD PRESSURE: 106 MMHG | BODY MASS INDEX: 30.37 KG/M2

## 2024-03-18 DIAGNOSIS — H65.91 OME (OTITIS MEDIA WITH EFFUSION), RIGHT: ICD-10-CM

## 2024-03-18 DIAGNOSIS — H69.91 DYSFUNCTION OF RIGHT EUSTACHIAN TUBE: Primary | ICD-10-CM

## 2024-03-18 PROCEDURE — 99213 OFFICE O/P EST LOW 20 MIN: CPT | Performed by: PHYSICIAN ASSISTANT

## 2024-03-18 ASSESSMENT — ENCOUNTER SYMPTOMS
SINUS PRESSURE: 0
SORE THROAT: 0
FATIGUE: 0
CHILLS: 0
HEADACHES: 0
SINUS PAIN: 0
SHORTNESS OF BREATH: 0
EYE DISCHARGE: 0
COUGH: 0
FEVER: 0
WEAKNESS: 0
DIZZINESS: 0
PALPITATIONS: 0
CHOKING: 0

## 2024-03-18 NOTE — PROGRESS NOTES
Assessment & Plan     Dysfunction of right eustachian tube  Patient reports pressure in right ear for 1 month. Patient had COVID about 2 weeks ago. She had headaches, chills and fever. She is feeling a lot better but has noticed ringing in her right ear. This has started about 1 week ago. She notices it more at night when it is quite. No hearing loss, drainage or pain.  No change in balance or headaches. On exam TM is cloudy and filled with fluid. Use over the counter antihistamine like Claritin or Zyrtec. Also Flonase as well.  If symptoms are not better in the next 2 to 3 weeks she is to follow-up with her primary care provider for further evaluation.    OME (otitis media with effusion), right  On exam patient's ear canal shows erythematous and decrease in size. TM fluid filled. Will start on  ciprofloxacin-hydrocortisone. Place 3 drops into the right ear while laying on left side 2 times daily.   - ciprofloxacin-hydrocortisone (CIPRO HC OTIC) 0.2-1 % otic suspension; Place 3 drops into the right ear 2 times daily      Filiberto Kennedy is a 33 year old, presenting for the following health issues:  Ear Fullness (Right ear fullness and ringing in the ear )        3/18/2024     6:59 AM   Additional Questions   Roomed by DIANA Gasca     Marcia is a pleasant 33 year old who present to the clinic for right ear pain and ringing. Patient reports pressure in right ear for 1 month. Patient had both ears pierced and has been cleaning ears with saline solution. Reported that saline sometimes get into ear but would drain out. Patient had COVID about 2 weeks ago. She had headaches, chills and fever. She is feeling a lot better but has noticed ringing in her right ear. This has started about 1 week ago. She notices it more at night when it is quite. No hearing loss, drainage or pain.  No change in balance or headaches.     History of Present Illness       Reason for visit:  Ear pain and ringing  Symptom onset:  3-4 weeks  "ago    She eats 2-3 servings of fruits and vegetables daily.She consumes 0 sweetened beverage(s) daily.She exercises with enough effort to increase her heart rate 30 to 60 minutes per day.  She exercises with enough effort to increase her heart rate 6 days per week.   She is taking medications regularly.         ..Review of Systems   Constitutional:  Negative for chills, fatigue and fever.   HENT:  Positive for tinnitus. Negative for congestion, ear discharge, ear pain, hearing loss, sinus pressure, sinus pain and sore throat.    Eyes:  Negative for discharge.   Respiratory:  Negative for cough, choking and shortness of breath.    Cardiovascular:  Negative for chest pain, palpitations and leg swelling.   Neurological:  Negative for dizziness, weakness and headaches.           Objective    /64   Pulse 67   Temp 98.1  F (36.7  C) (Oral)   Resp 16   Ht 1.664 m (5' 5.5\")   Wt 85.7 kg (189 lb)   LMP 12/06/2023   SpO2 97%   Breastfeeding No   BMI 30.97 kg/m    Body mass index is 30.97 kg/m .  Physical Exam  HENT:      Right Ear: Swelling present. A middle ear effusion is present. Tympanic membrane is erythematous and bulging.      Left Ear: Tympanic membrane, ear canal and external ear normal.      Mouth/Throat:      Mouth: Mucous membranes are moist.      Pharynx: Oropharynx is clear. No oropharyngeal exudate or posterior oropharyngeal erythema.   Eyes:      Conjunctiva/sclera: Conjunctivae normal.   Pulmonary:      Effort: Pulmonary effort is normal.      Breath sounds: Normal breath sounds. No stridor. No wheezing.   Skin:     General: Skin is warm and dry.      Coloration: Skin is not jaundiced.      Findings: No erythema.   Neurological:      Mental Status: She is alert and oriented to person, place, and time. Mental status is at baseline.      Sensory: No sensory deficit.      Motor: No weakness.   Psychiatric:         Mood and Affect: Mood normal.          I assessed this patient in the clinic with " Susy HOOPER student.  I agree with the above note which summarizes my findings and current recommendations. I have reviewed all diagnostics noted and performed physical exam. Changes were made in the body of the note to achieve one comprehensive document.          Signed Electronically by: Geronimo Toscano PA-C

## 2024-03-19 ENCOUNTER — MYC MEDICAL ADVICE (OUTPATIENT)
Dept: FAMILY MEDICINE | Facility: CLINIC | Age: 34
End: 2024-03-19
Payer: COMMERCIAL

## 2024-03-19 DIAGNOSIS — H65.91 OME (OTITIS MEDIA WITH EFFUSION), RIGHT: Primary | ICD-10-CM

## 2024-03-19 RX ORDER — CIPROFLOXACIN HYDROCHLORIDE 3.5 MG/ML
1-2 SOLUTION/ DROPS TOPICAL 2 TIMES DAILY
Qty: 5 ML | Refills: 0 | Status: SHIPPED | OUTPATIENT
Start: 2024-03-19 | End: 2024-03-26

## 2024-07-18 ENCOUNTER — E-VISIT (OUTPATIENT)
Dept: URGENT CARE | Facility: CLINIC | Age: 34
End: 2024-07-18
Payer: COMMERCIAL

## 2024-07-18 DIAGNOSIS — R30.0 DIFFICULT OR PAINFUL URINATION: Primary | ICD-10-CM

## 2024-07-18 PROCEDURE — 99421 OL DIG E/M SVC 5-10 MIN: CPT | Performed by: INTERNAL MEDICINE

## 2024-07-18 RX ORDER — NITROFURANTOIN 25; 75 MG/1; MG/1
100 CAPSULE ORAL 2 TIMES DAILY
Qty: 10 CAPSULE | Refills: 0 | Status: SHIPPED | OUTPATIENT
Start: 2024-07-18 | End: 2024-07-23

## 2024-07-18 NOTE — PATIENT INSTRUCTIONS
Dear Ameena Cardoza    After reviewing your responses, I've been able to diagnose you with a urinary tract infection, which is a common infection of the bladder with bacteria.  This is not a sexually transmitted infection, though urinating immediately after intercourse can help prevent infections.  Drinking lots of fluids is also helpful to clear your current infection and prevent the next one.      I have sent a prescription for antibiotics to your pharmacy to treat this infection.    It is important that you take all of your prescribed medication even if your symptoms are improving after a few doses.  Taking all of your medicine helps prevent the symptoms from returning.     If your symptoms worsen, you develop pain in your back or stomach, develop fevers, or are not improving in 5 days, please contact your primary care provider for an appointment or visit any of our convenient Walk-in or Urgent Care Centers to be seen, which can be found on our website here.    Thanks again for choosing us as your health care partner,    Jade Aguilar MD  Urinary Tract Infection (UTI) in Women: Care Instructions  Overview     A urinary tract infection (UTI) is an infection caused by bacteria. It can happen anywhere in the urinary tract. A UTI can happen in the:  Kidneys.  Ureters, the tubes that connect the kidneys to the bladder.  Bladder.  Urethra, where the urine comes out.  Most UTIs are bladder infections. They often cause pain or burning when you urinate.  Most UTIs can be cured with antibiotics. If you are prescribed antibiotics, be sure to complete your treatment so that the infection does not get worse.  Follow-up care is a key part of your treatment and safety. Be sure to make and go to all appointments, and call your doctor if you are having problems. It's also a good idea to know your test results and keep a list of the medicines you take.  How can you care for yourself at home?  Take your antibiotics as  "directed. Do not stop taking them just because you feel better. You need to take the full course of antibiotics.  Drink extra water and other fluids for the next day or two. This will help make the urine less concentrated and help wash out the bacteria that are causing the infection. (If you have kidney, heart, or liver disease and have to limit fluids, talk with your doctor before you increase the amount of fluids you drink.)  Avoid drinks that are carbonated or have caffeine. They can irritate the bladder.  Urinate often. Try to empty your bladder each time.  To relieve pain, take a hot bath or lay a heating pad set on low over your lower belly or genital area. Never go to sleep with a heating pad in place.  To prevent UTIs  Drink plenty of water each day. This helps you urinate often, which clears bacteria from your system. (If you have kidney, heart, or liver disease and have to limit fluids, talk with your doctor before you increase the amount of fluids you drink.)  Urinate when you need to.  If you are sexually active, urinate right after you have sex.  Change sanitary pads often.  Avoid douches, bubble baths, feminine hygiene sprays, and other feminine hygiene products that have deodorants.  After going to the bathroom, wipe from front to back.  When should you call for help?   Call your doctor now or seek immediate medical care if:    You have new or worse fever, chills, nausea, or vomiting.     You have new pain in your back just below your rib cage. This is called flank pain.     There is new blood or pus in your urine.     You have any problems with your antibiotic medicine.   Watch closely for changes in your health, and be sure to contact your doctor if:    You are not getting better after taking an antibiotic for 2 days.     Your symptoms go away but then come back.   Where can you learn more?  Go to https://www.healthwise.net/patiented  Enter K848 in the search box to learn more about \"Urinary Tract " "Infection (UTI) in Women: Care Instructions.\"  Current as of: November 15, 2023               Content Version: 14.0    9837-4699 [employer].   Care instructions adapted under license by your healthcare professional. If you have questions about a medical condition or this instruction, always ask your healthcare professional. [employer] disclaims any warranty or liability for your use of this information.      "

## 2024-09-05 ENCOUNTER — E-VISIT (OUTPATIENT)
Dept: URGENT CARE | Facility: CLINIC | Age: 34
End: 2024-09-05
Payer: COMMERCIAL

## 2024-09-05 DIAGNOSIS — R30.0 DIFFICULT OR PAINFUL URINATION: Primary | ICD-10-CM

## 2024-09-05 PROCEDURE — 99421 OL DIG E/M SVC 5-10 MIN: CPT | Performed by: NURSE PRACTITIONER

## 2024-09-06 ENCOUNTER — LAB (OUTPATIENT)
Dept: LAB | Facility: CLINIC | Age: 34
End: 2024-09-06
Payer: COMMERCIAL

## 2024-09-06 DIAGNOSIS — N30.00 ACUTE CYSTITIS WITHOUT HEMATURIA: Primary | ICD-10-CM

## 2024-09-06 DIAGNOSIS — R30.0 DIFFICULT OR PAINFUL URINATION: ICD-10-CM

## 2024-09-06 LAB
ALBUMIN UR-MCNC: ABNORMAL MG/DL
APPEARANCE UR: CLEAR
BACTERIA #/AREA URNS HPF: ABNORMAL /HPF
BILIRUB UR QL STRIP: NEGATIVE
COLOR UR AUTO: ABNORMAL
GLUCOSE UR STRIP-MCNC: 100 MG/DL
HGB UR QL STRIP: ABNORMAL
KETONES UR STRIP-MCNC: NEGATIVE MG/DL
LEUKOCYTE ESTERASE UR QL STRIP: ABNORMAL
NITRATE UR QL: POSITIVE
PH UR STRIP: 6 [PH] (ref 5–7)
RBC #/AREA URNS AUTO: ABNORMAL /HPF
SP GR UR STRIP: 1.01 (ref 1–1.03)
SQUAMOUS #/AREA URNS AUTO: ABNORMAL /LPF
UROBILINOGEN UR STRIP-ACNC: 2 E.U./DL
WBC #/AREA URNS AUTO: ABNORMAL /HPF

## 2024-09-06 PROCEDURE — 87086 URINE CULTURE/COLONY COUNT: CPT

## 2024-09-06 PROCEDURE — 81001 URINALYSIS AUTO W/SCOPE: CPT

## 2024-09-06 RX ORDER — NITROFURANTOIN 25; 75 MG/1; MG/1
100 CAPSULE ORAL 2 TIMES DAILY
Qty: 10 CAPSULE | Refills: 0 | Status: SHIPPED | OUTPATIENT
Start: 2024-09-06 | End: 2024-09-11

## 2024-09-06 NOTE — PATIENT INSTRUCTIONS
Dear Ameena Cardoza,     After reviewing your responses, I would like you to come in for a urine test to make sure we treat you correctly. This urine test is to evaluate you for a possible urinary tract infection, and should be scheduled for today or tomorrow. Schedule a Lab Only appointment here.     Lab appointments are not available at most locations on the weekends. If no Lab Only appointment is available, you should be seen in any of our convenient Walk-in or Urgent Care Centers, which can be found on our website here.     You will receive instructions with your results in [employer] once they are available.     If your symptoms worsen, you develop pain in your back or stomach, develop fevers, or are not improving in 5 days, please contact your primary care provider for an appointment or visit a Walk-in or Urgent Care Center to be seen.     Thanks again for choosing us as your health care partner,     Briana Martinez, CNP

## 2024-09-07 LAB — BACTERIA UR CULT: NORMAL

## 2024-10-03 ENCOUNTER — E-VISIT (OUTPATIENT)
Dept: URGENT CARE | Facility: CLINIC | Age: 34
End: 2024-10-03
Payer: COMMERCIAL

## 2024-10-03 DIAGNOSIS — R30.0 DYSURIA: Primary | ICD-10-CM

## 2024-10-03 PROCEDURE — 99421 OL DIG E/M SVC 5-10 MIN: CPT | Performed by: FAMILY MEDICINE

## 2024-10-04 ENCOUNTER — LAB (OUTPATIENT)
Dept: LAB | Facility: CLINIC | Age: 34
End: 2024-10-04
Payer: COMMERCIAL

## 2024-10-04 DIAGNOSIS — R30.0 DYSURIA: ICD-10-CM

## 2024-10-04 LAB
ALBUMIN UR-MCNC: NEGATIVE MG/DL
APPEARANCE UR: ABNORMAL
BACTERIA #/AREA URNS HPF: ABNORMAL /HPF
BILIRUB UR QL STRIP: NEGATIVE
COLOR UR AUTO: YELLOW
GLUCOSE UR STRIP-MCNC: NEGATIVE MG/DL
HGB UR QL STRIP: NEGATIVE
KETONES UR STRIP-MCNC: NEGATIVE MG/DL
LEUKOCYTE ESTERASE UR QL STRIP: NEGATIVE
NITRATE UR QL: NEGATIVE
PH UR STRIP: 6 [PH] (ref 5–8)
RBC #/AREA URNS AUTO: ABNORMAL /HPF
SP GR UR STRIP: 1.02 (ref 1–1.03)
SQUAMOUS #/AREA URNS AUTO: ABNORMAL /LPF
UROBILINOGEN UR STRIP-ACNC: 0.2 E.U./DL
WBC #/AREA URNS AUTO: ABNORMAL /HPF

## 2024-10-04 PROCEDURE — 81001 URINALYSIS AUTO W/SCOPE: CPT

## 2024-10-04 NOTE — PATIENT INSTRUCTIONS
Dear Ameena Cardoza,     After reviewing your responses, I would like you to come in for a urine test to make sure we treat you correctly. This urine test is to evaluate you for a possible urinary tract infection, and should be scheduled for today or tomorrow. Schedule a Lab Only appointment here.     Lab appointments are not available at most locations on the weekends. If no Lab Only appointment is available, you should be seen in any of our convenient Walk-in or Urgent Care Centers, which can be found on our website here.     You will receive instructions with your results in ethology once they are available.     If your symptoms worsen, you develop pain in your back or stomach, develop fevers, or are not improving in 5 days, please contact your primary care provider for an appointment or visit a Walk-in or Urgent Care Center to be seen.     Thanks again for choosing us as your health care partner,     HERNAN Stinson CNP

## 2025-01-08 ENCOUNTER — PATIENT OUTREACH (OUTPATIENT)
Dept: CARE COORDINATION | Facility: CLINIC | Age: 35
End: 2025-01-08
Payer: COMMERCIAL

## 2025-01-22 ENCOUNTER — PATIENT OUTREACH (OUTPATIENT)
Dept: CARE COORDINATION | Facility: CLINIC | Age: 35
End: 2025-01-22
Payer: COMMERCIAL

## 2025-02-20 ENCOUNTER — ANCILLARY PROCEDURE (OUTPATIENT)
Dept: GENERAL RADIOLOGY | Facility: CLINIC | Age: 35
End: 2025-02-20
Attending: FAMILY MEDICINE
Payer: COMMERCIAL

## 2025-02-20 ENCOUNTER — OFFICE VISIT (OUTPATIENT)
Dept: FAMILY MEDICINE | Facility: CLINIC | Age: 35
End: 2025-02-20
Payer: COMMERCIAL

## 2025-02-20 VITALS
WEIGHT: 188.4 LBS | RESPIRATION RATE: 16 BRPM | HEART RATE: 76 BPM | DIASTOLIC BLOOD PRESSURE: 69 MMHG | OXYGEN SATURATION: 98 % | BODY MASS INDEX: 30.28 KG/M2 | SYSTOLIC BLOOD PRESSURE: 118 MMHG | TEMPERATURE: 98.6 F | HEIGHT: 66 IN

## 2025-02-20 DIAGNOSIS — M79.645 THUMB PAIN, LEFT: Primary | ICD-10-CM

## 2025-02-20 DIAGNOSIS — M79.645 THUMB PAIN, LEFT: ICD-10-CM

## 2025-02-20 RX ORDER — MUPIROCIN 20 MG/G
OINTMENT TOPICAL
COMMUNITY
Start: 2024-10-17

## 2025-02-20 NOTE — PROGRESS NOTES
"  Assessment & Plan     Thumb pain, left  X-ray imaging displaying no signs of metal object  Suspect trauma due to puncture  Suggested topical antibiotics covering with a bandage over the next couple weeks to allow the area to heal  Slight darkening area possible debris  - XR Finger Left G/E 2 Views            BMI  Estimated body mass index is 30.41 kg/m  as calculated from the following:    Height as of this encounter: 1.676 m (5' 6\").    Weight as of this encounter: 85.5 kg (188 lb 6.4 oz).             Filiberto Kennedy is a 34 year old, presenting for the following health issues:  Puncture Wound (Poked thumb about a week and a half ago with a sewing needle.  Can see a spot in it and swollen and tender. )      2/20/2025     7:52 AM   Additional Questions   Roomed by SHREYAS Golden CMA([facility])     History of Present Illness       Reason for visit:  I poked myself with a sewing pin and i believe it broke off deep within my thumb  Symptom onset:  1-3 days ago   She is taking medications regularly.  34-year-old female here for evaluation of possible foreign object to the left thumb  Patient was sewing and stuck a needle that possibly broke off in her left thumb over a week ago  Here with sensitivity and pain in the area with visible darkening-x-ray imaging obtained.  No signs of foreign object on x-ray-darkening sign of the skin likely debris from puncture wound discussed with her cover with antibiotic ointment and bandage over the upcoming weeks and allowing skin to heal.                  Objective    /69   Pulse 76   Temp 98.6  F (37  C) (Oral)   Resp 16   Ht 1.676 m (5' 6\")   Wt 85.5 kg (188 lb 6.4 oz)   LMP 02/02/2025   SpO2 98%   BMI 30.41 kg/m    Body mass index is 30.41 kg/m .  Physical Exam   GENERAL: alert and no distress  EYES: Eyes grossly normal to inspection, PERRL and conjunctivae and sclerae normal  MS: Left thumb 2 to 3 mm darkened area to the medial cuticle-sensitive to the touch no erythema no " swelling  NEURO: Normal strength and tone, mentation intact and speech normal  PSYCH: mentation appears normal, affect normal/bright            Signed Electronically by: Aj Grayson MD

## 2025-03-15 ENCOUNTER — HEALTH MAINTENANCE LETTER (OUTPATIENT)
Age: 35
End: 2025-03-15

## 2025-07-24 ENCOUNTER — PATIENT OUTREACH (OUTPATIENT)
Dept: CARE COORDINATION | Facility: CLINIC | Age: 35
End: 2025-07-24
Payer: COMMERCIAL

## (undated) DEVICE — SU VICRYL 4-0 PS-2 18" UND J496G

## (undated) DEVICE — SOL NACL 0.9% IRRIG 1000ML BOTTLE 07138-09

## (undated) DEVICE — PACK C-SECTION LF PL15OTA83B

## (undated) DEVICE — PREP CHLORAPREP 26ML TINTED ORANGE  260815

## (undated) DEVICE — ESU GROUND PAD UNIVERSAL W/O CORD

## (undated) DEVICE — SOL WATER IRRIG 1000ML BOTTLE 07139-09

## (undated) DEVICE — DRSG STERI STRIP 1/2X4" R1547

## (undated) DEVICE — DRAPE SETUP C-SECTION INVISISHIELD 54X90" DYNJE5600

## (undated) DEVICE — STRAP KNEE/BODY 31143004

## (undated) DEVICE — BASIN SET MAJOR

## (undated) DEVICE — SU MONOCRYL 0 CTB-1 36" YB946

## (undated) DEVICE — SUCTION CANISTER MEDIVAC LINER 1500ML W/LID 65651-515

## (undated) DEVICE — GLOVE ESTEEM POWDER FREE SMT 6.5  2D72PT65

## (undated) DEVICE — SU VICRYL 0 CT-1 36" J346H

## (undated) DEVICE — CATH TRAY FOLEY 16FR BARDEX W/DRAIN BAG STATLOCK 300316A

## (undated) DEVICE — SOL ADH LIQUID BENZOIN SWAB 0.6ML C1544

## (undated) DEVICE — BNDG ABDOMINAL BINDER 10X26-50" 08140145

## (undated) DEVICE — STOCKING SLEEVE COMPRESSION CALF LG

## (undated) RX ORDER — ACETAMINOPHEN 325 MG/1
TABLET ORAL
Status: DISPENSED
Start: 2020-01-08

## (undated) RX ORDER — NALBUPHINE HYDROCHLORIDE 10 MG/ML
INJECTION, SOLUTION INTRAMUSCULAR; INTRAVENOUS; SUBCUTANEOUS
Status: DISPENSED
Start: 2020-01-08

## (undated) RX ORDER — FENTANYL CITRATE 50 UG/ML
INJECTION, SOLUTION INTRAMUSCULAR; INTRAVENOUS
Status: DISPENSED
Start: 2020-01-08

## (undated) RX ORDER — MORPHINE SULFATE 1 MG/ML
INJECTION, SOLUTION EPIDURAL; INTRATHECAL; INTRAVENOUS
Status: DISPENSED
Start: 2020-01-08

## (undated) RX ORDER — OXYTOCIN/0.9 % SODIUM CHLORIDE 30/500 ML
PLASTIC BAG, INJECTION (ML) INTRAVENOUS
Status: DISPENSED
Start: 2020-01-08

## (undated) RX ORDER — HYDROMORPHONE HYDROCHLORIDE 1 MG/ML
INJECTION, SOLUTION INTRAMUSCULAR; INTRAVENOUS; SUBCUTANEOUS
Status: DISPENSED
Start: 2020-01-08